# Patient Record
Sex: FEMALE | Race: BLACK OR AFRICAN AMERICAN | NOT HISPANIC OR LATINO | Employment: FULL TIME | URBAN - METROPOLITAN AREA
[De-identification: names, ages, dates, MRNs, and addresses within clinical notes are randomized per-mention and may not be internally consistent; named-entity substitution may affect disease eponyms.]

---

## 2017-02-14 ENCOUNTER — OFFICE VISIT - HEALTHEAST (OUTPATIENT)
Dept: FAMILY MEDICINE | Facility: CLINIC | Age: 52
End: 2017-02-14

## 2017-02-14 ENCOUNTER — RECORDS - HEALTHEAST (OUTPATIENT)
Dept: ADMINISTRATIVE | Facility: OTHER | Age: 52
End: 2017-02-14

## 2017-02-14 DIAGNOSIS — E66.9 OBESITY: ICD-10-CM

## 2017-02-14 DIAGNOSIS — R53.83 FATIGUE, UNSPECIFIED TYPE: ICD-10-CM

## 2017-02-14 DIAGNOSIS — M54.50 CHRONIC BILATERAL LOW BACK PAIN WITHOUT SCIATICA: ICD-10-CM

## 2017-02-14 DIAGNOSIS — G89.29 CHRONIC BILATERAL LOW BACK PAIN WITHOUT SCIATICA: ICD-10-CM

## 2017-02-14 DIAGNOSIS — Z00.00 ROUTINE GENERAL MEDICAL EXAMINATION AT A HEALTH CARE FACILITY: ICD-10-CM

## 2017-02-14 DIAGNOSIS — M54.6 CHRONIC BILATERAL THORACIC BACK PAIN: ICD-10-CM

## 2017-02-14 DIAGNOSIS — M79.89 SOFT TISSUE MASS: ICD-10-CM

## 2017-02-14 DIAGNOSIS — E55.9 VITAMIN D DEFICIENCY: ICD-10-CM

## 2017-02-14 DIAGNOSIS — G89.29 CHRONIC BILATERAL THORACIC BACK PAIN: ICD-10-CM

## 2017-02-14 LAB
CHOLEST SERPL-MCNC: 231 MG/DL
FASTING STATUS PATIENT QL REPORTED: YES
HDLC SERPL-MCNC: 61 MG/DL
LDLC SERPL CALC-MCNC: 154 MG/DL
TRIGL SERPL-MCNC: 81 MG/DL

## 2017-02-14 ASSESSMENT — MIFFLIN-ST. JEOR: SCORE: 1472.65

## 2017-02-16 ENCOUNTER — OFFICE VISIT - HEALTHEAST (OUTPATIENT)
Dept: SURGERY | Facility: CLINIC | Age: 52
End: 2017-02-16

## 2017-02-16 DIAGNOSIS — R22.31 MASS OF SKIN OF RIGHT SHOULDER: ICD-10-CM

## 2017-02-16 ASSESSMENT — MIFFLIN-ST. JEOR: SCORE: 1479

## 2017-02-23 ENCOUNTER — HOSPITAL ENCOUNTER (OUTPATIENT)
Dept: ULTRASOUND IMAGING | Facility: HOSPITAL | Age: 52
Discharge: HOME | End: 2017-02-23
Attending: SURGERY

## 2017-02-23 DIAGNOSIS — R22.31 MASS OF SKIN OF RIGHT SHOULDER: ICD-10-CM

## 2017-03-01 ENCOUNTER — COMMUNICATION - HEALTHEAST (OUTPATIENT)
Dept: SURGERY | Facility: CLINIC | Age: 52
End: 2017-03-01

## 2017-04-27 ENCOUNTER — COMMUNICATION - HEALTHEAST (OUTPATIENT)
Dept: FAMILY MEDICINE | Facility: CLINIC | Age: 52
End: 2017-04-27

## 2017-05-18 ENCOUNTER — OFFICE VISIT - HEALTHEAST (OUTPATIENT)
Dept: SURGERY | Facility: CLINIC | Age: 52
End: 2017-05-18

## 2017-05-18 DIAGNOSIS — R22.31 MASS OF SKIN OF RIGHT SHOULDER: ICD-10-CM

## 2017-05-18 ASSESSMENT — MIFFLIN-ST. JEOR: SCORE: 1476.73

## 2017-05-19 ENCOUNTER — RECORDS - HEALTHEAST (OUTPATIENT)
Dept: ADMINISTRATIVE | Facility: OTHER | Age: 52
End: 2017-05-19

## 2017-05-19 ENCOUNTER — AMBULATORY - HEALTHEAST (OUTPATIENT)
Dept: SURGERY | Facility: CLINIC | Age: 52
End: 2017-05-19

## 2017-05-22 ENCOUNTER — RECORDS - HEALTHEAST (OUTPATIENT)
Dept: ADMINISTRATIVE | Facility: OTHER | Age: 52
End: 2017-05-22

## 2017-05-22 ENCOUNTER — AMBULATORY - HEALTHEAST (OUTPATIENT)
Dept: SURGERY | Facility: CLINIC | Age: 52
End: 2017-05-22

## 2017-05-25 ENCOUNTER — HOSPITAL ENCOUNTER (OUTPATIENT)
Dept: MRI IMAGING | Facility: HOSPITAL | Age: 52
Discharge: HOME | End: 2017-05-25
Attending: PSYCHIATRY & NEUROLOGY

## 2017-05-25 DIAGNOSIS — R51.9 OCCIPITAL PAIN: ICD-10-CM

## 2017-05-25 DIAGNOSIS — M54.2 NECK PAIN: ICD-10-CM

## 2017-05-25 DIAGNOSIS — I67.1 CEREBRAL ANEURYSM: ICD-10-CM

## 2017-07-18 ENCOUNTER — RECORDS - HEALTHEAST (OUTPATIENT)
Dept: ADMINISTRATIVE | Facility: OTHER | Age: 52
End: 2017-07-18

## 2017-08-01 ENCOUNTER — RECORDS - HEALTHEAST (OUTPATIENT)
Dept: ADMINISTRATIVE | Facility: OTHER | Age: 52
End: 2017-08-01

## 2018-01-02 ENCOUNTER — RECORDS - HEALTHEAST (OUTPATIENT)
Dept: ADMINISTRATIVE | Facility: OTHER | Age: 53
End: 2018-01-02

## 2018-01-15 ENCOUNTER — OFFICE VISIT - HEALTHEAST (OUTPATIENT)
Dept: FAMILY MEDICINE | Facility: CLINIC | Age: 53
End: 2018-01-15

## 2018-01-15 DIAGNOSIS — M35.00 SICCA SYNDROME (H): ICD-10-CM

## 2018-01-15 DIAGNOSIS — J45.21 MILD INTERMITTENT ASTHMA WITH EXACERBATION: ICD-10-CM

## 2018-01-15 DIAGNOSIS — M54.6 CHRONIC MIDLINE THORACIC BACK PAIN: ICD-10-CM

## 2018-01-15 DIAGNOSIS — J20.9 ACUTE BRONCHITIS, UNSPECIFIED ORGANISM: ICD-10-CM

## 2018-01-15 DIAGNOSIS — G89.29 CHRONIC MIDLINE THORACIC BACK PAIN: ICD-10-CM

## 2018-01-15 ASSESSMENT — MIFFLIN-ST. JEOR: SCORE: 1486.26

## 2018-02-09 ENCOUNTER — OFFICE VISIT (OUTPATIENT)
Dept: FAMILY MEDICINE | Facility: CLINIC | Age: 53
End: 2018-02-09
Payer: COMMERCIAL

## 2018-02-09 VITALS
TEMPERATURE: 98 F | OXYGEN SATURATION: 98 % | HEART RATE: 65 BPM | WEIGHT: 200 LBS | DIASTOLIC BLOOD PRESSURE: 59 MMHG | SYSTOLIC BLOOD PRESSURE: 97 MMHG | BODY MASS INDEX: 34.15 KG/M2 | HEIGHT: 64 IN

## 2018-02-09 DIAGNOSIS — Z78.9 ENGAGES IN TRAVEL ABROAD: Primary | ICD-10-CM

## 2018-02-09 PROBLEM — C50.912 MALIGNANT NEOPLASM OF LEFT FEMALE BREAST, UNSPECIFIED ESTROGEN RECEPTOR STATUS, UNSPECIFIED SITE OF BREAST (H): Status: ACTIVE | Noted: 2018-02-09

## 2018-02-09 PROCEDURE — 99212 OFFICE O/P EST SF 10 MIN: CPT | Mod: 25 | Performed by: PHYSICIAN ASSISTANT

## 2018-02-09 PROCEDURE — 90734 MENACWYD/MENACWYCRM VACC IM: CPT | Performed by: PHYSICIAN ASSISTANT

## 2018-02-09 PROCEDURE — 90471 IMMUNIZATION ADMIN: CPT | Performed by: PHYSICIAN ASSISTANT

## 2018-02-09 NOTE — NURSING NOTE
"Chief Complaint   Patient presents with     \[facility] [place] [facility]\"" Care     menatra shot        Initial BP 97/59 (BP Location: Right arm, Patient Position: Sitting, Cuff Size: Adult Large)  Pulse 65  Temp 98  F (36.7  C) (Oral)  Ht 5' 4\" (1.626 m)  Wt 200 lb (90.7 kg)  SpO2 98%  BMI 34.33 kg/m2 Estimated body mass index is 34.33 kg/(m^2) as calculated from the following:    Height as of this encounter: 5' 4\" (1.626 m).    Weight as of this encounter: 200 lb (90.7 kg).  Medication Reconciliation: complete  Candice Choudhury MA    "

## 2018-02-09 NOTE — MR AVS SNAPSHOT
"              After Visit Summary   2018    Roverto Gonzalez    MRN: [id number]           Patient Information     Date Of Birth          1965        Visit Information        Provider Department      2018 2:00 PM Gladys Johnston PA-C [facility]        Today's Diagnoses     Engages in travel abroad    -  1       Follow-ups after your visit        Who to contact     If you have questions or need follow up information about today's clinic visit or your schedule please contact [facility] directly at [phone].  Normal or non-critical lab and imaging results will be communicated to you by M.dothart, letter or phone within 4 business days after the clinic has received the results. If you do not hear from us within 7 days, please contact the clinic through M.dothart or phone. If you have a critical or abnormal lab result, we will notify you by phone as soon as possible.  Submit refill requests through [employer] or call your pharmacy and they will forward the refill request to us. Please allow 3 business days for your refill to be completed.          Additional Information About Your Visit        MyChart Information     [employer] lets you send messages to your doctor, view your test results, renew your prescriptions, schedule appointments and more. To sign up, go to [email or web address] . Click on \"Log in\" on the left side of the screen, which will take you to the Welcome page. Then click on \"Sign up Now\" on the right side of the page.     You will be asked to enter the access code listed below, as well as some personal information. Please follow the directions to create your username and password.     Your access code is: [email or web address]  Expires: 2018  6:35 AM     Your access code will  in 90 days. If you need help or a new code, please call your [facility] or [phone].        Care EveryWhere ID     This is your Care EveryWhere ID. This could be " "used by other organizations to access your [facility] medical records  [id number]        Your Vitals Were     Pulse Temperature Height Pulse Oximetry BMI (Body Mass Index)       65 98  F (36.7  C) (Oral) 5' 4\" (1.626 m) 98% 34.33 kg/m2        Blood Pressure from Last 3 Encounters:   02/09/18 97/59    Weight from Last 3 Encounters:   02/09/18 200 lb (90.7 kg)              We Performed the Following     MENINGOCOCCAL VACCINE,IM (MENACTRA ))        Primary Care Provider Office Phone # Fax #    [facility] [phone]       [address] [place] [id number]        Equal Access to Services     MIRZA GARCIA : Rogerio Martinez, wareed franco, cheryl kaalmada ramonita, bonnie slaughter . [facility] [phone].    ATENCIÓN: Si habla español, tiene a silvestre disposición servicios gratuitos de asistencia lingüística. Llame al [phone].    We comply with applicable federal civil rights laws and [place] laws. We do not discriminate on the basis of race, color, national origin, age, disability, sex, sexual orientation, or gender identity.            Thank you!     Thank you for choosing [facility]  for your care. Our goal is always to provide you with excellent care. Hearing back from our patients is one way we can continue to improve our services. Please take a few minutes to complete the written survey that you may receive in the mail after your visit with us. Thank you!             Your Updated Medication List - Protect others around you: Learn how to safely use, store and throw away your medicines at www.disposemymeds.org.      Notice  As of 2/9/2018  2:39 PM    You have not been prescribed any medications.      "

## 2018-02-09 NOTE — NURSING NOTE
Patient is covered under this program for the following reason:        Screening Questionnaire for Adult Immunization   Are you sick today?   No    Do you have allergies to medications, food, a vaccine component or latex?   No    Have you ever had a serious reaction after receiving a vaccination?   No    Do you have a long-term health problem with heart disease, lung disease,  asthma, kidney disease, metabolic (e.g., diabetes), anemia, or other blood disorder?   No    Do you have cancer, leukemia,HIV/ AIDS, or any immune system problem?   No       In the past 3 months, have you taken medications that weaken your immune system, such as cortisone, prednisone, other steroids, or anticancer drugs, or have you had radiation treatments?   No    Have you had a seizure or a brain, or other nervous system problem?   No    During the past year, have you received a transfusion of blood or blood       products, or been given a  immune (gamma) globulin or an antiviral drug?   No    For women: Are you pregnant or is there a chance you could become         pregnant during the next month?   No    Have you received any vaccinations in the past 4 weeks?   No     Immunization questionnaire answers were all negative.     VIS for Menatra  given on same date of administration.  Staff signature/Title: Candice Choudhury MA  Due to injection administration, patient instructed to remain in clinic for 15 minutes  afterwards, and to report any adverse reaction to me immediately.

## 2018-02-09 NOTE — PROGRESS NOTES
"  SUBJECTIVE:   Roverto Gonzalez is a 53 year old female who presents to clinic today for the following health issues:      New Patient/Transfer of Care    Meningococcal  shot    Traveling to [facility]    Needs meningitis for visa   Leaving March 11 for a pilgrimage.        Problem list and histories reviewed & adjusted, as indicated.    Additional history: as documented    There is no problem list on file for this patient.    Past Surgical History:   Procedure Laterality Date     LUMPECTOMY BREAST      breast left     US DRAIN OF SEROMA/HEMATOMA/ABSCESS/CYST Right 2017       Social History   Substance Use Topics     Smoking status: Never Smoker     Smokeless tobacco: Never Used     Alcohol use No     Family History   Problem Relation Age of Onset     DIABETES Mother      CANCER Father            Reviewed and updated as needed this visit by clinical staff  Allergies  Meds       Reviewed and updated as needed this visit by Provider         ROS:  As above    OBJECTIVE:     BP 97/59 (BP Location: Right arm, Patient Position: Sitting, Cuff Size: Adult Large)  Pulse 65  Temp 98  F (36.7  C) (Oral)  Ht 5' 4\" (1.626 m)  Wt 200 lb (90.7 kg)  SpO2 98%  BMI 34.33 kg/m2  Body mass index is 34.33 kg/(m^2).  GENERAL: healthy, alert and no distress    Diagnostic Test Results:  none     ASSESSMENT/PLAN:       1. Engages in travel abroad  Needs this for visa.  Should also get Hep a and typhoid and polio.  Pt only wants the meningitis today   - MENINGOCOCCAL VACCINE,IM (MENACTRA ))        Gladys Johnston PA-C  [facility]      "

## 2018-02-16 ENCOUNTER — OFFICE VISIT - HEALTHEAST (OUTPATIENT)
Dept: FAMILY MEDICINE | Facility: CLINIC | Age: 53
End: 2018-02-16

## 2018-02-16 DIAGNOSIS — J45.20 MILD INTERMITTENT ASTHMA WITHOUT COMPLICATION: ICD-10-CM

## 2018-02-16 DIAGNOSIS — R73.01 IMPAIRED FASTING GLUCOSE: ICD-10-CM

## 2018-02-16 DIAGNOSIS — E66.811 OBESITY (BMI 30.0-34.9): ICD-10-CM

## 2018-02-16 DIAGNOSIS — M35.00 SICCA SYNDROME (H): ICD-10-CM

## 2018-02-16 DIAGNOSIS — E55.9 VITAMIN D DEFICIENCY: ICD-10-CM

## 2018-02-16 DIAGNOSIS — Z13.1 DIABETES MELLITUS SCREENING: ICD-10-CM

## 2018-02-16 DIAGNOSIS — Z00.00 ROUTINE GENERAL MEDICAL EXAMINATION AT A HEALTH CARE FACILITY: ICD-10-CM

## 2018-02-16 DIAGNOSIS — Z13.220 LIPID SCREENING: ICD-10-CM

## 2018-02-16 LAB
CHOLEST SERPL-MCNC: 219 MG/DL
FASTING STATUS PATIENT QL REPORTED: YES
FASTING STATUS PATIENT QL REPORTED: YES
GLUCOSE BLD-MCNC: 104 MG/DL (ref 70–125)
HBA1C MFR BLD: 6.2 % (ref 3.5–6)
HDLC SERPL-MCNC: 51 MG/DL
LDLC SERPL CALC-MCNC: 149 MG/DL
TRIGL SERPL-MCNC: 97 MG/DL

## 2018-02-16 ASSESSMENT — MIFFLIN-ST. JEOR: SCORE: 1470.27

## 2018-02-19 LAB
25(OH)D3 SERPL-MCNC: 24.6 NG/ML (ref 30–80)
25(OH)D3 SERPL-MCNC: 24.6 NG/ML (ref 30–80)

## 2018-04-04 ENCOUNTER — COMMUNICATION - HEALTHEAST (OUTPATIENT)
Dept: FAMILY MEDICINE | Facility: CLINIC | Age: 53
End: 2018-04-04

## 2018-04-16 ENCOUNTER — COMMUNICATION - HEALTHEAST (OUTPATIENT)
Dept: FAMILY MEDICINE | Facility: CLINIC | Age: 53
End: 2018-04-16

## 2018-04-16 ENCOUNTER — TELEPHONE (OUTPATIENT)
Dept: FAMILY MEDICINE | Facility: CLINIC | Age: 53
End: 2018-04-16

## 2018-04-16 NOTE — LETTER
April 16, 2018    Roverto Carlos  [address] [address]    Dear Roverto    We care about your health and have reviewed your health plan. We have reviewed your medical conditions, medication list, and lab results and are making recommendations based on this review, to better manage your health.    You are in particular need of attention regarding:  - Scheduling a Breast Cancer Screening (Mammography) [phone]  - Scheduling a Colon Cancer Screening (Colonoscopy only) [phone]  - Scheduling a Physical with a Cervical Cancer Screening (Pap Smear) age 64 and younger [phone]      Here is a list of Health Maintenance topics that are due now or due soon:  Health Maintenance Due   Topic Date Due     HEPATITIS C SCREENING  01/01/1981     PAP SCREENING Q3 YR (SYSTEM ASSIGNED)  01/01/1984     LIPID SCREEN Q5 YR FEMALE (SYSTEM ASSIGNED)  01/01/2008     MAMMO SCREEN Q2 YR (SYSTEM ASSIGNED)  01/01/2013     COLON CANCER SCREEN (SYSTEM ASSIGNED)  01/01/2013     ADVANCE DIRECTIVE PLANNING Q5 YRS  01/01/2018     We will be calling you in the next couple of weeks to help you schedule any appointments that are needed.  Please call us at [phone] (or use [employer]) to address the above recommendations.     Thank you for trusting [facility] and we appreciate the opportunity to serve you.  We look forward to supporting your healthcare needs in the future.    Healthy Regards,    Lina Johnston

## 2018-04-16 NOTE — TELEPHONE ENCOUNTER
Panel Management Review      Patient has the following on her problem list: None      Composite cancer screening  Chart review shows that this patient is due/due soon for the following Pap Smear, Mammogram and Colonoscopy  Summary:    Patient is due/failing the following:   COLONOSCOPY, MAMMOGRAM, PAP and PHYSICAL    Action needed:   Patient needs office visit for Physical .    Type of outreach:    Sent letter.    Questions for provider review:    None                                                                                                                                    Candice Choudhury MA       Chart routed to Care Team .

## 2018-05-22 ENCOUNTER — RECORDS - HEALTHEAST (OUTPATIENT)
Dept: GENERAL RADIOLOGY | Facility: CLINIC | Age: 53
End: 2018-05-22

## 2018-05-22 ENCOUNTER — OFFICE VISIT - HEALTHEAST (OUTPATIENT)
Dept: RHEUMATOLOGY | Facility: CLINIC | Age: 53
End: 2018-05-22

## 2018-05-22 DIAGNOSIS — M79.645 PAIN OF LEFT MIDDLE FINGER: ICD-10-CM

## 2018-05-22 DIAGNOSIS — G47.8 NON-RESTORATIVE SLEEP: ICD-10-CM

## 2018-05-22 DIAGNOSIS — R76.8 JO-1 ANTIBODY POSITIVE: ICD-10-CM

## 2018-05-22 DIAGNOSIS — L29.9 PRURITIC CONDITION: ICD-10-CM

## 2018-05-22 DIAGNOSIS — R09.89 RHONCHI: ICD-10-CM

## 2018-05-22 DIAGNOSIS — M77.12 LATERAL EPICONDYLITIS, LEFT ELBOW: ICD-10-CM

## 2018-05-22 DIAGNOSIS — R68.2 DRY MOUTH: ICD-10-CM

## 2018-05-22 DIAGNOSIS — M79.18 MYOFASCIAL PAIN: ICD-10-CM

## 2018-05-22 DIAGNOSIS — M79.645 PAIN IN LEFT FINGER(S): ICD-10-CM

## 2018-05-22 DIAGNOSIS — R05.3 COUGH, PERSISTENT: ICD-10-CM

## 2018-05-22 DIAGNOSIS — M77.12 LEFT LATERAL EPICONDYLITIS: ICD-10-CM

## 2018-05-22 DIAGNOSIS — E55.9 VITAMIN D DEFICIENCY: ICD-10-CM

## 2018-05-22 LAB
ALBUMIN SERPL-MCNC: 3.8 G/DL (ref 3.5–5)
ALBUMIN UR-MCNC: NEGATIVE MG/DL
ALP SERPL-CCNC: 118 U/L (ref 45–120)
ALT SERPL W P-5'-P-CCNC: 64 U/L (ref 0–45)
APPEARANCE UR: CLEAR
AST SERPL W P-5'-P-CCNC: 35 U/L (ref 0–40)
BASOPHILS # BLD AUTO: 0 THOU/UL (ref 0–0.2)
BASOPHILS NFR BLD AUTO: 0 % (ref 0–2)
BILIRUB DIRECT SERPL-MCNC: 0.2 MG/DL
BILIRUB SERPL-MCNC: 0.5 MG/DL (ref 0–1)
BILIRUB UR QL STRIP: NEGATIVE
C REACTIVE PROTEIN LHE: 1.4 MG/DL (ref 0–0.8)
CALCIUM SERPL-MCNC: 9.4 MG/DL (ref 8.5–10.5)
CK SERPL-CCNC: 88 U/L (ref 30–190)
COLOR UR AUTO: YELLOW
CREAT SERPL-MCNC: 0.65 MG/DL (ref 0.6–1.1)
EOSINOPHIL # BLD AUTO: 0.3 THOU/UL (ref 0–0.4)
EOSINOPHIL NFR BLD AUTO: 8 % (ref 0–6)
ERYTHROCYTE [DISTWIDTH] IN BLOOD BY AUTOMATED COUNT: 13.3 % (ref 11–14.5)
ERYTHROCYTE [SEDIMENTATION RATE] IN BLOOD BY WESTERGREN METHOD: 42 MM/HR (ref 0–20)
GFR SERPL CREATININE-BSD FRML MDRD: >60 ML/MIN/1.73M2
GLUCOSE UR STRIP-MCNC: NEGATIVE MG/DL
HCT VFR BLD AUTO: 38 % (ref 35–47)
HGB BLD-MCNC: 13.1 G/DL (ref 12–16)
HGB UR QL STRIP: NEGATIVE
KETONES UR STRIP-MCNC: NEGATIVE MG/DL
LEUKOCYTE ESTERASE UR QL STRIP: NEGATIVE
LYMPHOCYTES # BLD AUTO: 1.9 THOU/UL (ref 0.8–4.4)
LYMPHOCYTES NFR BLD AUTO: 43 % (ref 20–40)
MCH RBC QN AUTO: 28.3 PG (ref 27–34)
MCHC RBC AUTO-ENTMCNC: 34.5 G/DL (ref 32–36)
MCV RBC AUTO: 82 FL (ref 80–100)
MONOCYTES # BLD AUTO: 0.3 THOU/UL (ref 0–0.9)
MONOCYTES NFR BLD AUTO: 8 % (ref 2–10)
NEUTROPHILS # BLD AUTO: 1.9 THOU/UL (ref 2–7.7)
NEUTROPHILS NFR BLD AUTO: 42 % (ref 50–70)
NITRATE UR QL: NEGATIVE
PH UR STRIP: 7 [PH] (ref 5–8)
PLATELET # BLD AUTO: 194 THOU/UL (ref 140–440)
PMV BLD AUTO: 8.2 FL (ref 7–10)
PROT SERPL-MCNC: 7.5 G/DL (ref 6–8)
RBC # BLD AUTO: 4.63 MILL/UL (ref 3.8–5.4)
SP GR UR STRIP: 1.02 (ref 1–1.03)
TSH SERPL DL<=0.005 MIU/L-ACNC: 1.55 UIU/ML (ref 0.3–5)
UROBILINOGEN UR STRIP-ACNC: NORMAL
WBC: 4.4 THOU/UL (ref 4–11)

## 2018-05-22 ASSESSMENT — MIFFLIN-ST. JEOR: SCORE: 1462.56

## 2018-05-23 LAB — 25(OH)D3 SERPL-MCNC: 27 NG/ML (ref 30–80)

## 2018-05-29 ENCOUNTER — COMMUNICATION - HEALTHEAST (OUTPATIENT)
Dept: RHEUMATOLOGY | Facility: CLINIC | Age: 53
End: 2018-05-29

## 2018-05-29 DIAGNOSIS — D70.9 NEUTROPENIA (H): ICD-10-CM

## 2018-05-29 DIAGNOSIS — E55.9 VITAMIN D DEFICIENCY: ICD-10-CM

## 2018-05-29 DIAGNOSIS — R74.01 ELEVATED ALT MEASUREMENT: ICD-10-CM

## 2018-05-29 LAB
JO-1 AUTOANTIBODIES - HISTORICAL: 29 EU
SS-A/RO AUTOANTIBODIES - HISTORICAL: 1 EU
SS-B/LA AUTOANTIBODIES - HISTORICAL: 1 EU

## 2018-06-26 ENCOUNTER — OFFICE VISIT - HEALTHEAST (OUTPATIENT)
Dept: RHEUMATOLOGY | Facility: CLINIC | Age: 53
End: 2018-06-26

## 2018-06-26 DIAGNOSIS — M77.12 LEFT LATERAL EPICONDYLITIS: ICD-10-CM

## 2018-06-26 DIAGNOSIS — M79.18 MYOFASCIAL PAIN: ICD-10-CM

## 2018-06-26 DIAGNOSIS — R70.0 ELEVATED SED RATE: ICD-10-CM

## 2018-06-26 DIAGNOSIS — R09.89 RHONCHI: ICD-10-CM

## 2018-06-26 DIAGNOSIS — R76.8 JO-1 ANTIBODY POSITIVE: ICD-10-CM

## 2018-06-26 DIAGNOSIS — D70.9 NEUTROPENIA, UNSPECIFIED TYPE (H): ICD-10-CM

## 2018-06-26 DIAGNOSIS — E55.9 VITAMIN D DEFICIENCY: ICD-10-CM

## 2018-06-26 DIAGNOSIS — R21 RASH: ICD-10-CM

## 2018-06-26 DIAGNOSIS — R74.01 ELEVATED ALT MEASUREMENT: ICD-10-CM

## 2018-06-26 ASSESSMENT — MIFFLIN-ST. JEOR: SCORE: 1462.56

## 2018-06-27 ENCOUNTER — AMBULATORY - HEALTHEAST (OUTPATIENT)
Dept: LAB | Facility: CLINIC | Age: 53
End: 2018-06-27

## 2018-06-27 DIAGNOSIS — R76.8 JO-1 ANTIBODY POSITIVE: ICD-10-CM

## 2018-06-27 DIAGNOSIS — R74.01 ELEVATED ALT MEASUREMENT: ICD-10-CM

## 2018-06-27 DIAGNOSIS — R21 RASH: ICD-10-CM

## 2018-06-27 DIAGNOSIS — D70.9 NEUTROPENIA, UNSPECIFIED TYPE (H): ICD-10-CM

## 2018-06-27 DIAGNOSIS — R70.0 ELEVATED SED RATE: ICD-10-CM

## 2018-06-27 LAB
ALBUMIN SERPL-MCNC: 4 G/DL (ref 3.5–5)
ALP SERPL-CCNC: 100 U/L (ref 45–120)
ALT SERPL W P-5'-P-CCNC: 22 U/L (ref 0–45)
AST SERPL W P-5'-P-CCNC: 20 U/L (ref 0–40)
BASOPHILS # BLD AUTO: 0 THOU/UL (ref 0–0.2)
BASOPHILS NFR BLD AUTO: 0 % (ref 0–2)
BILIRUB DIRECT SERPL-MCNC: 0.2 MG/DL
BILIRUB SERPL-MCNC: 0.6 MG/DL (ref 0–1)
C REACTIVE PROTEIN LHE: 1.5 MG/DL (ref 0–0.8)
CK SERPL-CCNC: 107 U/L (ref 30–190)
EOSINOPHIL # BLD AUTO: 0.5 THOU/UL (ref 0–0.4)
EOSINOPHIL NFR BLD AUTO: 11 % (ref 0–6)
ERYTHROCYTE [DISTWIDTH] IN BLOOD BY AUTOMATED COUNT: 12.3 % (ref 11–14.5)
ERYTHROCYTE [SEDIMENTATION RATE] IN BLOOD BY WESTERGREN METHOD: 38 MM/HR (ref 0–20)
HCT VFR BLD AUTO: 38.3 % (ref 35–47)
HGB BLD-MCNC: 13.5 G/DL (ref 12–16)
LYMPHOCYTES # BLD AUTO: 2 THOU/UL (ref 0.8–4.4)
LYMPHOCYTES NFR BLD AUTO: 40 % (ref 20–40)
MCH RBC QN AUTO: 28.8 PG (ref 27–34)
MCHC RBC AUTO-ENTMCNC: 35.2 G/DL (ref 32–36)
MCV RBC AUTO: 82 FL (ref 80–100)
MONOCYTES # BLD AUTO: 0.5 THOU/UL (ref 0–0.9)
MONOCYTES NFR BLD AUTO: 10 % (ref 2–10)
NEUTROPHILS # BLD AUTO: 1.9 THOU/UL (ref 2–7.7)
NEUTROPHILS NFR BLD AUTO: 39 % (ref 50–70)
PLATELET # BLD AUTO: 185 THOU/UL (ref 140–440)
PMV BLD AUTO: 8.5 FL (ref 7–10)
PROT SERPL-MCNC: 7.7 G/DL (ref 6–8)
RBC # BLD AUTO: 4.68 MILL/UL (ref 3.8–5.4)
WBC: 5 THOU/UL (ref 4–11)

## 2018-06-28 LAB
ALBUMIN PERCENT: 57.1 % (ref 51–67)
ALBUMIN SERPL ELPH-MCNC: 4.4 G/DL (ref 3.2–4.7)
ALPHA 1 PERCENT: 2.4 % (ref 2–4)
ALPHA 2 PERCENT: 10.7 % (ref 5–13)
ALPHA1 GLOB SERPL ELPH-MCNC: 0.2 G/DL (ref 0.1–0.3)
ALPHA2 GLOB SERPL ELPH-MCNC: 0.8 G/DL (ref 0.4–0.9)
B-GLOBULIN SERPL ELPH-MCNC: 1 G/DL (ref 0.7–1.2)
BETA PERCENT: 12.8 % (ref 10–17)
GAMMA GLOB SERPL ELPH-MCNC: 1.3 G/DL (ref 0.6–1.4)
GAMMA GLOBULIN PERCENT: 17 % (ref 9–20)
HBV SURFACE AG SERPL QL IA: NEGATIVE
HCV AB SERPL QL IA: NEGATIVE
PATH ICD:: NORMAL
PROT PATTERN SERPL ELPH-IMP: NORMAL
PROT SERPL-MCNC: 7.7 G/DL (ref 6–8)
REVIEWING PATHOLOGIST: NORMAL

## 2018-06-29 LAB
ACE SERPL-CCNC: 49 U/L (ref 9–67)
ANCA IGG TITR SER IF: NORMAL {TITER}
PATH ICD:: NORMAL
PROT PATTERN SERPL ELPH-IMP: NORMAL
REVIEWING PATHOLOGIST: NORMAL
TOTAL PROTEIN RANDOM URINE MG/DL: 10 MG/DL

## 2018-07-03 ENCOUNTER — COMMUNICATION - HEALTHEAST (OUTPATIENT)
Dept: RHEUMATOLOGY | Facility: CLINIC | Age: 53
End: 2018-07-03

## 2018-07-03 LAB — DNA (DS) ANTIBODY - HISTORICAL: 3 IU

## 2018-09-06 ENCOUNTER — TELEPHONE (OUTPATIENT)
Dept: FAMILY MEDICINE | Facility: CLINIC | Age: 53
End: 2018-09-06

## 2018-09-06 NOTE — LETTER
September 6, 2018    Roverto Carlos  [address] [facility] [address]    Dear Roverto    We care about your health and have reviewed your health plan. We have reviewed your medical conditions, medication list, and lab results and are making recommendations based on this review, to better manage your health.    You are in particular need of attention regarding:  - Scheduling a Breast Cancer Screening (Mammography) [phone]  - Scheduling a Colon Cancer Screening (Colonoscopy only) [phone]  - Scheduling a Physical with a Cervical Cancer Screening (Pap Smear) age 64 and younger [phone]      Here is a list of Health Maintenance topics that are due now or due soon:  Health Maintenance Due   Topic Date Due     PHQ-2 Q1 YR  01/01/1975     HIV SCREEN (SYSTEM ASSIGNED)  01/01/1981     HEPATITIS C SCREENING  01/01/1981     PAP SCREENING Q3 YR (SYSTEM ASSIGNED)  01/01/1984     LIPID SCREEN Q5 YR FEMALE (SYSTEM ASSIGNED)  01/01/2008     MAMMO SCREEN Q2 YR (SYSTEM ASSIGNED)  01/01/2013     COLON CANCER SCREEN (SYSTEM ASSIGNED)  01/01/2013     ADVANCE DIRECTIVE PLANNING Q5 YRS  01/01/2018     INFLUENZA VACCINE (1) 09/01/2018     We will be calling you in the next couple of weeks to help you schedule any appointments that are needed.  Please call us at [phone] (or use [employer]) to address the above recommendations.     Thank you for trusting [facility] and we appreciate the opportunity to serve you.  We look forward to supporting your healthcare needs in the future.    Healthy Regards,  Lina Johnston

## 2018-09-06 NOTE — TELEPHONE ENCOUNTER
Panel Management Review      Patient has the following on her problem list: None      Composite cancer screening  Chart review shows that this patient is due/due soon for the following Pap Smear, Mammogram and Colonoscopy  Summary:    Patient is due/failing the following:   COLONOSCOPY, MAMMOGRAM, PAP and PHYSICAL    Action needed:   Patient needs office visit for Physical .    Type of outreach:    Sent letter.    Questions for provider review:    None                                                                                                                                         Chart routed to Care Team .

## 2018-09-06 NOTE — LETTER
September 17, 2018    Roverto Gonzalez  [address] [facility] [address]      Dear Roverto Gonzalez,     We have tried to contact you about your health, but have been unable to reach you.  Please call us as soon as possible so we can provide you with the best care possible.  We will continue to check in with you throughout the year to complete these items of care, if you are not able to complete these items at this time.  If you would like to complete the missing items for your care, please contact us at [phone].    We recommend the following:  -schedule a MAMMOGRAM 1 in 8 women will develop invasive breast cancer during her lifetime and it is the most common non-skin cancer in American women.  EARLY detection, new treatments, and a better understanding of the disease have increased survival rates - the 5 year survival rate in the 1960s was 63% and today it is close to 90% .  Please disregard this reminder if you have had this exam elsewhere within the last year.  It would be helpful for us to have a copy of your mammogram report in our file so that we can best coordinate your care - please contact us with when your test was done so we can update your record. Please call [phone] to schedule your mammogram today.   -schedule a COLONOSCOPY to look for colon cancer (due every 10 years or 5 years in higher risk situations.)   Colon cancer is now the second leading cause of death in the [place] for both men and women and there are over 130,000 new cases and 50,000 deaths per year from colon cancer.  Colonoscopies can prevent 90-95% of these deaths.  Problem lesions can be removed before they ever become cancer.  This test is not only looking for cancer, but also getting rid of precancerious lesions.  If you do not wish to do a colonoscopy or cannot afford to do one, at this time, there is another option. It is called a FIT test.  -schedule a PAP SMEAR EXAM which is due.  Please disregard this  reminder if you have had this exam elsewhere within the last year.  It would be helpful for us to have a copy of your recent pap smear report in our file so that we can best coordinate your care.        Sincerely,     Your Care Team at [facility]

## 2018-09-28 ENCOUNTER — COMMUNICATION - HEALTHEAST (OUTPATIENT)
Dept: FAMILY MEDICINE | Facility: CLINIC | Age: 53
End: 2018-09-28

## 2019-03-19 ENCOUNTER — COMMUNICATION - HEALTHEAST (OUTPATIENT)
Dept: FAMILY MEDICINE | Facility: CLINIC | Age: 54
End: 2019-03-19

## 2019-03-25 ENCOUNTER — HOSPITAL ENCOUNTER (OUTPATIENT)
Dept: LAB | Age: 54
Setting detail: SPECIMEN
Discharge: HOME | End: 2019-03-25

## 2019-03-25 ENCOUNTER — RECORDS - HEALTHEAST (OUTPATIENT)
Dept: GENERAL RADIOLOGY | Facility: CLINIC | Age: 54
End: 2019-03-25

## 2019-03-25 ENCOUNTER — OFFICE VISIT - HEALTHEAST (OUTPATIENT)
Dept: FAMILY MEDICINE | Facility: CLINIC | Age: 54
End: 2019-03-25

## 2019-03-25 DIAGNOSIS — R42 DIZZINESS: ICD-10-CM

## 2019-03-25 DIAGNOSIS — E66.811 CLASS 1 OBESITY DUE TO EXCESS CALORIES WITH SERIOUS COMORBIDITY AND BODY MASS INDEX (BMI) OF 33.0 TO 33.9 IN ADULT: ICD-10-CM

## 2019-03-25 DIAGNOSIS — E66.09 CLASS 1 OBESITY DUE TO EXCESS CALORIES WITH SERIOUS COMORBIDITY AND BODY MASS INDEX (BMI) OF 33.0 TO 33.9 IN ADULT: ICD-10-CM

## 2019-03-25 DIAGNOSIS — M35.00 SICCA SYNDROME (H): ICD-10-CM

## 2019-03-25 DIAGNOSIS — R05.9 COUGH: ICD-10-CM

## 2019-03-25 DIAGNOSIS — D70.9 NEUTROPENIA, UNSPECIFIED TYPE (H): ICD-10-CM

## 2019-03-25 DIAGNOSIS — E78.2 MIXED HYPERLIPIDEMIA: ICD-10-CM

## 2019-03-25 DIAGNOSIS — J45.21 MILD INTERMITTENT ASTHMA WITH EXACERBATION: ICD-10-CM

## 2019-03-25 DIAGNOSIS — M54.2 NECK PAIN: ICD-10-CM

## 2019-03-25 DIAGNOSIS — E55.9 VITAMIN D DEFICIENCY: ICD-10-CM

## 2019-03-25 DIAGNOSIS — R73.03 PREDIABETES: ICD-10-CM

## 2019-03-25 DIAGNOSIS — H93.8X2 PRESSURE SENSATION IN LEFT EAR: ICD-10-CM

## 2019-03-25 DIAGNOSIS — C50.512 MALIGNANT NEOPLASM OF LOWER-OUTER QUADRANT OF LEFT FEMALE BREAST, UNSPECIFIED ESTROGEN RECEPTOR STATUS (H): ICD-10-CM

## 2019-03-25 DIAGNOSIS — Z00.00 ROUTINE GENERAL MEDICAL EXAMINATION AT A HEALTH CARE FACILITY: ICD-10-CM

## 2019-03-25 LAB
ALBUMIN SERPL-MCNC: 3.8 G/DL (ref 3.5–5)
ALP SERPL-CCNC: 85 U/L (ref 45–120)
ALT SERPL W P-5'-P-CCNC: 35 U/L (ref 0–45)
ANION GAP SERPL CALCULATED.3IONS-SCNC: 9 MMOL/L (ref 5–18)
AST SERPL W P-5'-P-CCNC: 28 U/L (ref 0–40)
BILIRUB SERPL-MCNC: 0.4 MG/DL (ref 0–1)
BUN SERPL-MCNC: 14 MG/DL (ref 8–22)
CALCIUM SERPL-MCNC: 9.1 MG/DL (ref 8.5–10.5)
CHLORIDE BLD-SCNC: 108 MMOL/L (ref 98–107)
CHOLEST SERPL-MCNC: 217 MG/DL
CO2 SERPL-SCNC: 25 MMOL/L (ref 22–31)
CREAT SERPL-MCNC: 0.67 MG/DL (ref 0.6–1.1)
ERYTHROCYTE [DISTWIDTH] IN BLOOD BY AUTOMATED COUNT: 13 % (ref 11–14.5)
ERYTHROCYTE [SEDIMENTATION RATE] IN BLOOD BY WESTERGREN METHOD: 44 MM/HR (ref 0–20)
FASTING STATUS PATIENT QL REPORTED: YES
GFR SERPL CREATININE-BSD FRML MDRD: >60 ML/MIN/1.73M2
GLUCOSE BLD-MCNC: 102 MG/DL (ref 70–125)
HBA1C MFR BLD: 5.8 % (ref 3.5–6)
HCT VFR BLD AUTO: 37.9 % (ref 35–47)
HDLC SERPL-MCNC: 59 MG/DL
HGB BLD-MCNC: 12.5 G/DL (ref 12–16)
LDLC SERPL CALC-MCNC: 136 MG/DL
MCH RBC QN AUTO: 27.9 PG (ref 27–34)
MCHC RBC AUTO-ENTMCNC: 33 G/DL (ref 32–36)
MCV RBC AUTO: 85 FL (ref 80–100)
PLATELET # BLD AUTO: 167 THOU/UL (ref 140–440)
PMV BLD AUTO: 8.8 FL (ref 7–10)
POTASSIUM BLD-SCNC: 4.1 MMOL/L (ref 3.5–5)
PROT SERPL-MCNC: 7.5 G/DL (ref 6–8)
RBC # BLD AUTO: 4.47 MILL/UL (ref 3.8–5.4)
SODIUM SERPL-SCNC: 142 MMOL/L (ref 136–145)
TRIGL SERPL-MCNC: 109 MG/DL
TSH SERPL DL<=0.005 MIU/L-ACNC: 2.46 UIU/ML (ref 0.3–5)
WBC: 4.8 THOU/UL (ref 4–11)

## 2019-03-25 ASSESSMENT — MIFFLIN-ST. JEOR: SCORE: 1469.81

## 2019-03-26 LAB — 25(OH)D3 SERPL-MCNC: 19.4 NG/ML (ref 30–80)

## 2019-03-31 ENCOUNTER — AMBULATORY - HEALTHEAST (OUTPATIENT)
Dept: FAMILY MEDICINE | Facility: CLINIC | Age: 54
End: 2019-03-31

## 2019-03-31 ENCOUNTER — COMMUNICATION - HEALTHEAST (OUTPATIENT)
Dept: FAMILY MEDICINE | Facility: CLINIC | Age: 54
End: 2019-03-31

## 2019-03-31 DIAGNOSIS — E55.9 VITAMIN D DEFICIENCY: ICD-10-CM

## 2019-04-01 ENCOUNTER — OFFICE VISIT - HEALTHEAST (OUTPATIENT)
Dept: AUDIOLOGY | Facility: CLINIC | Age: 54
End: 2019-04-01

## 2019-04-01 ENCOUNTER — OFFICE VISIT - HEALTHEAST (OUTPATIENT)
Dept: OTOLARYNGOLOGY | Facility: CLINIC | Age: 54
End: 2019-04-01

## 2019-04-01 DIAGNOSIS — H93.8X2 PLUGGED FEELING IN EAR, LEFT: ICD-10-CM

## 2019-04-01 DIAGNOSIS — H93.12 TINNITUS OF LEFT EAR: ICD-10-CM

## 2019-04-01 DIAGNOSIS — H93.8X2 PRESSURE SENSATION IN LEFT EAR: ICD-10-CM

## 2019-04-01 DIAGNOSIS — R51.9 PRESSURE IN HEAD: ICD-10-CM

## 2019-04-01 DIAGNOSIS — H69.91 TYPE C TYMPANOGRAM OF RIGHT EAR: ICD-10-CM

## 2019-04-04 ENCOUNTER — OFFICE VISIT - HEALTHEAST (OUTPATIENT)
Dept: RHEUMATOLOGY | Facility: CLINIC | Age: 54
End: 2019-04-04

## 2019-04-04 DIAGNOSIS — M54.9 UPPER BACK PAIN: ICD-10-CM

## 2019-04-04 DIAGNOSIS — D70.9 NEUTROPENIA, UNSPECIFIED TYPE (H): ICD-10-CM

## 2019-04-04 DIAGNOSIS — M77.12 LEFT LATERAL EPICONDYLITIS: ICD-10-CM

## 2019-04-04 DIAGNOSIS — G89.29 CHRONIC LEFT SHOULDER PAIN: ICD-10-CM

## 2019-04-04 DIAGNOSIS — M25.512 CHRONIC LEFT SHOULDER PAIN: ICD-10-CM

## 2019-04-04 DIAGNOSIS — R70.0 ELEVATED SED RATE: ICD-10-CM

## 2019-04-04 DIAGNOSIS — R76.8 JO-1 ANTIBODY POSITIVE: ICD-10-CM

## 2019-04-04 DIAGNOSIS — R06.02 SOB (SHORTNESS OF BREATH): ICD-10-CM

## 2019-04-04 DIAGNOSIS — R05.9 COUGH: ICD-10-CM

## 2019-04-04 LAB — ERYTHROCYTE [SEDIMENTATION RATE] IN BLOOD BY WESTERGREN METHOD: 37 MM/HR (ref 0–20)

## 2019-04-05 ENCOUNTER — COMMUNICATION - HEALTHEAST (OUTPATIENT)
Dept: OTOLARYNGOLOGY | Facility: CLINIC | Age: 54
End: 2019-04-05

## 2019-04-05 LAB
C REACTIVE PROTEIN LHE: 0.6 MG/DL (ref 0–0.8)
CK SERPL-CCNC: 68 U/L (ref 30–190)

## 2019-04-06 LAB
ALDOLASE SERPL-CCNC: 3.4 U/L (ref 1.5–8.1)
MYOGLOBIN SERPL-MCNC: 30 MCG/L

## 2019-07-19 ENCOUNTER — AMBULATORY - HEALTHEAST (OUTPATIENT)
Dept: LAB | Facility: CLINIC | Age: 54
End: 2019-07-19

## 2019-07-19 DIAGNOSIS — R76.8 JO-1 ANTIBODY POSITIVE: ICD-10-CM

## 2019-07-19 DIAGNOSIS — D70.9 NEUTROPENIA, UNSPECIFIED TYPE (H): ICD-10-CM

## 2019-07-19 DIAGNOSIS — R70.0 ELEVATED SED RATE: ICD-10-CM

## 2019-07-19 LAB
BASOPHILS # BLD AUTO: 0 THOU/UL (ref 0–0.2)
BASOPHILS NFR BLD AUTO: 0 % (ref 0–2)
EOSINOPHIL # BLD AUTO: 0.3 THOU/UL (ref 0–0.4)
EOSINOPHIL NFR BLD AUTO: 6 % (ref 0–6)
ERYTHROCYTE [DISTWIDTH] IN BLOOD BY AUTOMATED COUNT: 11.7 % (ref 11–14.5)
HCT VFR BLD AUTO: 38.3 % (ref 35–47)
HGB BLD-MCNC: 13 G/DL (ref 12–16)
LYMPHOCYTES # BLD AUTO: 1.8 THOU/UL (ref 0.8–4.4)
LYMPHOCYTES NFR BLD AUTO: 40 % (ref 20–40)
MCH RBC QN AUTO: 28.5 PG (ref 27–34)
MCHC RBC AUTO-ENTMCNC: 34 G/DL (ref 32–36)
MCV RBC AUTO: 84 FL (ref 80–100)
MONOCYTES # BLD AUTO: 0.2 THOU/UL (ref 0–0.9)
MONOCYTES NFR BLD AUTO: 5 % (ref 2–10)
NEUTROPHILS # BLD AUTO: 2.2 THOU/UL (ref 2–7.7)
NEUTROPHILS NFR BLD AUTO: 50 % (ref 50–70)
PLATELET # BLD AUTO: 193 THOU/UL (ref 140–440)
PMV BLD AUTO: 8.6 FL (ref 7–10)
RBC # BLD AUTO: 4.56 MILL/UL (ref 3.8–5.4)
WBC: 4.5 THOU/UL (ref 4–11)

## 2019-07-21 LAB — ALDOLASE SERPL-CCNC: 4.4 U/L (ref 1.5–8.1)

## 2019-07-22 ENCOUNTER — COMMUNICATION - HEALTHEAST (OUTPATIENT)
Dept: SCHEDULING | Facility: CLINIC | Age: 54
End: 2019-07-22

## 2019-07-22 ENCOUNTER — OFFICE VISIT - HEALTHEAST (OUTPATIENT)
Dept: FAMILY MEDICINE | Facility: CLINIC | Age: 54
End: 2019-07-22

## 2019-07-22 DIAGNOSIS — E55.9 VITAMIN D DEFICIENCY: ICD-10-CM

## 2019-07-22 DIAGNOSIS — J45.41 MODERATE PERSISTENT ASTHMA WITH EXACERBATION: ICD-10-CM

## 2019-07-22 DIAGNOSIS — I10 BENIGN ESSENTIAL HYPERTENSION: ICD-10-CM

## 2019-07-22 DIAGNOSIS — E78.2 MIXED HYPERLIPIDEMIA: ICD-10-CM

## 2019-07-22 DIAGNOSIS — R21 RASH: ICD-10-CM

## 2019-07-22 LAB
ANION GAP SERPL CALCULATED.3IONS-SCNC: 7 MMOL/L (ref 5–18)
BUN SERPL-MCNC: 14 MG/DL (ref 8–22)
CALCIUM SERPL-MCNC: 9.4 MG/DL (ref 8.5–10.5)
CHLORIDE BLD-SCNC: 107 MMOL/L (ref 98–107)
CO2 SERPL-SCNC: 27 MMOL/L (ref 22–31)
CREAT SERPL-MCNC: 0.74 MG/DL (ref 0.6–1.1)
GFR SERPL CREATININE-BSD FRML MDRD: >60 ML/MIN/1.73M2
GLUCOSE BLD-MCNC: 100 MG/DL (ref 70–125)
POTASSIUM BLD-SCNC: 4.3 MMOL/L (ref 3.5–5)
SODIUM SERPL-SCNC: 141 MMOL/L (ref 136–145)
TSH SERPL DL<=0.005 MIU/L-ACNC: 3.34 UIU/ML (ref 0.3–5)

## 2019-07-22 ASSESSMENT — MIFFLIN-ST. JEOR: SCORE: 1473.78

## 2019-07-23 LAB
25(OH)D3 SERPL-MCNC: 18.1 NG/ML (ref 30–80)
ATRIAL RATE - MUSE: 51 BPM
DIASTOLIC BLOOD PRESSURE - MUSE: NORMAL MMHG
INTERPRETATION ECG - MUSE: NORMAL
P AXIS - MUSE: 18 DEGREES
PR INTERVAL - MUSE: 174 MS
QRS DURATION - MUSE: 82 MS
QT - MUSE: 450 MS
QTC - MUSE: 414 MS
R AXIS - MUSE: -6 DEGREES
SYSTOLIC BLOOD PRESSURE - MUSE: NORMAL MMHG
T AXIS - MUSE: 31 DEGREES
VENTRICULAR RATE- MUSE: 51 BPM

## 2019-07-24 ENCOUNTER — AMBULATORY - HEALTHEAST (OUTPATIENT)
Dept: FAMILY MEDICINE | Facility: CLINIC | Age: 54
End: 2019-07-24

## 2019-07-24 DIAGNOSIS — E55.9 VITAMIN D DEFICIENCY: ICD-10-CM

## 2019-07-25 ENCOUNTER — COMMUNICATION - HEALTHEAST (OUTPATIENT)
Dept: FAMILY MEDICINE | Facility: CLINIC | Age: 54
End: 2019-07-25

## 2019-07-25 ENCOUNTER — OFFICE VISIT - HEALTHEAST (OUTPATIENT)
Dept: RHEUMATOLOGY | Facility: CLINIC | Age: 54
End: 2019-07-25

## 2019-07-25 DIAGNOSIS — R29.898 WEAKNESS OF BOTH HANDS: ICD-10-CM

## 2019-07-25 DIAGNOSIS — G89.29 CHRONIC LEFT SHOULDER PAIN: ICD-10-CM

## 2019-07-25 DIAGNOSIS — R06.02 SOB (SHORTNESS OF BREATH): ICD-10-CM

## 2019-07-25 DIAGNOSIS — M54.9 UPPER BACK PAIN: ICD-10-CM

## 2019-07-25 DIAGNOSIS — M25.512 CHRONIC LEFT SHOULDER PAIN: ICD-10-CM

## 2019-07-25 DIAGNOSIS — R76.8 JO-1 ANTIBODY POSITIVE: ICD-10-CM

## 2019-07-25 DIAGNOSIS — R29.898 WEAKNESS OF BOTH ARMS: ICD-10-CM

## 2019-07-25 DIAGNOSIS — R05.9 COUGH: ICD-10-CM

## 2019-07-25 DIAGNOSIS — R70.0 ELEVATED SED RATE: ICD-10-CM

## 2019-07-25 ASSESSMENT — MIFFLIN-ST. JEOR: SCORE: 1480.59

## 2019-07-29 ENCOUNTER — OFFICE VISIT - HEALTHEAST (OUTPATIENT)
Dept: PULMONOLOGY | Facility: OTHER | Age: 54
End: 2019-07-29

## 2019-07-29 ENCOUNTER — AMBULATORY - HEALTHEAST (OUTPATIENT)
Dept: PULMONOLOGY | Facility: OTHER | Age: 54
End: 2019-07-29

## 2019-07-29 DIAGNOSIS — J45.20 MILD INTERMITTENT ASTHMA WITHOUT COMPLICATION: ICD-10-CM

## 2019-07-29 DIAGNOSIS — J45.21 MILD INTERMITTENT ASTHMA WITH EXACERBATION: ICD-10-CM

## 2019-07-29 ASSESSMENT — MIFFLIN-ST. JEOR: SCORE: 1476.51

## 2019-09-03 ENCOUNTER — OFFICE VISIT - HEALTHEAST (OUTPATIENT)
Dept: PHYSICAL THERAPY | Facility: REHABILITATION | Age: 54
End: 2019-09-03

## 2019-09-03 ENCOUNTER — OFFICE VISIT - HEALTHEAST (OUTPATIENT)
Dept: OCCUPATIONAL THERAPY | Facility: REHABILITATION | Age: 54
End: 2019-09-03

## 2019-09-03 ENCOUNTER — AMBULATORY - HEALTHEAST (OUTPATIENT)
Dept: LAB | Facility: CLINIC | Age: 54
End: 2019-09-03

## 2019-09-03 DIAGNOSIS — Z78.9 DECREASED ACTIVITIES OF DAILY LIVING (ADL): ICD-10-CM

## 2019-09-03 DIAGNOSIS — M25.512 CHRONIC LEFT SHOULDER PAIN: ICD-10-CM

## 2019-09-03 DIAGNOSIS — M62.81 MUSCLE WEAKNESS OF LEFT UPPER EXTREMITY: ICD-10-CM

## 2019-09-03 DIAGNOSIS — G89.29 CHRONIC UPPER BACK PAIN: ICD-10-CM

## 2019-09-03 DIAGNOSIS — R70.0 ELEVATED SED RATE: ICD-10-CM

## 2019-09-03 DIAGNOSIS — R76.8 JO-1 ANTIBODY POSITIVE: ICD-10-CM

## 2019-09-03 DIAGNOSIS — R29.898 WEAKNESS OF BOTH HANDS: ICD-10-CM

## 2019-09-03 DIAGNOSIS — M54.9 CHRONIC UPPER BACK PAIN: ICD-10-CM

## 2019-09-03 DIAGNOSIS — G89.29 CHRONIC LEFT SHOULDER PAIN: ICD-10-CM

## 2019-09-03 DIAGNOSIS — M79.645 PAIN IN FINGER OF LEFT HAND: ICD-10-CM

## 2019-09-03 DIAGNOSIS — M25.612 DECREASED ROM OF LEFT SHOULDER: ICD-10-CM

## 2019-09-03 LAB
C REACTIVE PROTEIN LHE: 1.8 MG/DL (ref 0–0.8)
CK SERPL-CCNC: 132 U/L (ref 30–190)
ERYTHROCYTE [SEDIMENTATION RATE] IN BLOOD BY WESTERGREN METHOD: 50 MM/HR (ref 0–20)

## 2019-09-04 ENCOUNTER — RECORDS - HEALTHEAST (OUTPATIENT)
Dept: PULMONOLOGY | Facility: OTHER | Age: 54
End: 2019-09-04

## 2019-09-04 ENCOUNTER — RECORDS - HEALTHEAST (OUTPATIENT)
Dept: ADMINISTRATIVE | Facility: OTHER | Age: 54
End: 2019-09-04

## 2019-09-04 DIAGNOSIS — J45.20 MILD INTERMITTENT ASTHMA, UNCOMPLICATED: ICD-10-CM

## 2019-09-04 LAB — HGB BLD-MCNC: 11.5 G/DL

## 2019-09-06 ENCOUNTER — OFFICE VISIT - HEALTHEAST (OUTPATIENT)
Dept: PHYSICAL THERAPY | Facility: REHABILITATION | Age: 54
End: 2019-09-06

## 2019-09-06 DIAGNOSIS — M25.612 DECREASED ROM OF LEFT SHOULDER: ICD-10-CM

## 2019-09-06 DIAGNOSIS — M62.81 MUSCLE WEAKNESS OF LEFT UPPER EXTREMITY: ICD-10-CM

## 2019-09-06 DIAGNOSIS — G89.29 CHRONIC UPPER BACK PAIN: ICD-10-CM

## 2019-09-06 DIAGNOSIS — M54.9 CHRONIC UPPER BACK PAIN: ICD-10-CM

## 2019-09-06 DIAGNOSIS — G89.29 CHRONIC LEFT SHOULDER PAIN: ICD-10-CM

## 2019-09-06 DIAGNOSIS — M25.512 CHRONIC LEFT SHOULDER PAIN: ICD-10-CM

## 2019-09-06 LAB — MYOGLOBIN SERPL-MCNC: 29 MCG/L

## 2019-09-10 ENCOUNTER — OFFICE VISIT - HEALTHEAST (OUTPATIENT)
Dept: PHYSICAL THERAPY | Facility: REHABILITATION | Age: 54
End: 2019-09-10

## 2019-09-10 ENCOUNTER — OFFICE VISIT - HEALTHEAST (OUTPATIENT)
Dept: PULMONOLOGY | Facility: OTHER | Age: 54
End: 2019-09-10

## 2019-09-10 DIAGNOSIS — J45.40 MODERATE PERSISTENT ASTHMA WITHOUT COMPLICATION: ICD-10-CM

## 2019-09-10 DIAGNOSIS — G89.29 CHRONIC LEFT SHOULDER PAIN: ICD-10-CM

## 2019-09-10 DIAGNOSIS — M54.9 CHRONIC UPPER BACK PAIN: ICD-10-CM

## 2019-09-10 DIAGNOSIS — J45.21 MILD INTERMITTENT ASTHMA WITH EXACERBATION: ICD-10-CM

## 2019-09-10 DIAGNOSIS — M62.81 MUSCLE WEAKNESS OF LEFT UPPER EXTREMITY: ICD-10-CM

## 2019-09-10 DIAGNOSIS — G89.29 CHRONIC UPPER BACK PAIN: ICD-10-CM

## 2019-09-10 DIAGNOSIS — J98.4 RESTRICTIVE LUNG DISEASE: ICD-10-CM

## 2019-09-10 DIAGNOSIS — M25.612 DECREASED ROM OF LEFT SHOULDER: ICD-10-CM

## 2019-09-10 DIAGNOSIS — M25.512 CHRONIC LEFT SHOULDER PAIN: ICD-10-CM

## 2019-09-13 ENCOUNTER — HOSPITAL ENCOUNTER (OUTPATIENT)
Dept: CT IMAGING | Facility: HOSPITAL | Age: 54
Discharge: HOME | End: 2019-09-13
Attending: INTERNAL MEDICINE

## 2019-09-13 DIAGNOSIS — J98.4 RESTRICTIVE LUNG DISEASE: ICD-10-CM

## 2019-09-16 ENCOUNTER — COMMUNICATION - HEALTHEAST (OUTPATIENT)
Dept: FAMILY MEDICINE | Facility: CLINIC | Age: 54
End: 2019-09-16

## 2019-09-16 ENCOUNTER — COMMUNICATION - HEALTHEAST (OUTPATIENT)
Dept: PULMONOLOGY | Facility: OTHER | Age: 54
End: 2019-09-16

## 2019-09-16 DIAGNOSIS — I10 BENIGN ESSENTIAL HYPERTENSION: ICD-10-CM

## 2019-09-19 ENCOUNTER — COMMUNICATION - HEALTHEAST (OUTPATIENT)
Dept: RHEUMATOLOGY | Facility: CLINIC | Age: 54
End: 2019-09-19

## 2019-11-19 ENCOUNTER — AMBULATORY - HEALTHEAST (OUTPATIENT)
Dept: LAB | Facility: CLINIC | Age: 54
End: 2019-11-19

## 2019-11-19 DIAGNOSIS — R70.0 ELEVATED SED RATE: ICD-10-CM

## 2019-11-19 DIAGNOSIS — R76.8 JO-1 ANTIBODY POSITIVE: ICD-10-CM

## 2019-11-19 LAB
BASOPHILS # BLD AUTO: 0 THOU/UL (ref 0–0.2)
BASOPHILS NFR BLD AUTO: 1 % (ref 0–2)
C REACTIVE PROTEIN LHE: 1.4 MG/DL (ref 0–0.8)
CK SERPL-CCNC: 107 U/L (ref 30–190)
EOSINOPHIL # BLD AUTO: 0.2 THOU/UL (ref 0–0.4)
EOSINOPHIL NFR BLD AUTO: 4 % (ref 0–6)
ERYTHROCYTE [DISTWIDTH] IN BLOOD BY AUTOMATED COUNT: 11.9 % (ref 11–14.5)
ERYTHROCYTE [SEDIMENTATION RATE] IN BLOOD BY WESTERGREN METHOD: 44 MM/HR (ref 0–20)
HCT VFR BLD AUTO: 38.4 % (ref 35–47)
HGB BLD-MCNC: 13 G/DL (ref 12–16)
LYMPHOCYTES # BLD AUTO: 2.1 THOU/UL (ref 0.8–4.4)
LYMPHOCYTES NFR BLD AUTO: 40 % (ref 20–40)
MCH RBC QN AUTO: 28.6 PG (ref 27–34)
MCHC RBC AUTO-ENTMCNC: 33.9 G/DL (ref 32–36)
MCV RBC AUTO: 84 FL (ref 80–100)
MONOCYTES # BLD AUTO: 0.6 THOU/UL (ref 0–0.9)
MONOCYTES NFR BLD AUTO: 12 % (ref 2–10)
NEUTROPHILS # BLD AUTO: 2.2 THOU/UL (ref 2–7.7)
NEUTROPHILS NFR BLD AUTO: 44 % (ref 50–70)
PLATELET # BLD AUTO: 183 THOU/UL (ref 140–440)
PMV BLD AUTO: 8.7 FL (ref 7–10)
RBC # BLD AUTO: 4.55 MILL/UL (ref 3.8–5.4)
WBC: 5.1 THOU/UL (ref 4–11)

## 2019-11-21 LAB
ALDOLASE SERPL-CCNC: 5.8 U/L (ref 1.5–8.1)
MYOGLOBIN SERPL-MCNC: 30 MCG/L

## 2019-11-22 ENCOUNTER — OFFICE VISIT - HEALTHEAST (OUTPATIENT)
Dept: FAMILY MEDICINE | Facility: CLINIC | Age: 54
End: 2019-11-22

## 2019-11-22 DIAGNOSIS — R20.2 PARESTHESIA: ICD-10-CM

## 2019-11-22 DIAGNOSIS — R21 RASH: ICD-10-CM

## 2019-11-22 DIAGNOSIS — J45.40 MODERATE PERSISTENT ASTHMA WITHOUT COMPLICATION: ICD-10-CM

## 2019-11-22 DIAGNOSIS — M35.00 SICCA SYNDROME (H): ICD-10-CM

## 2019-11-22 LAB
HBA1C MFR BLD: 6.2 % (ref 3.5–6)
VIT B12 SERPL-MCNC: 579 PG/ML (ref 213–816)

## 2019-11-22 ASSESSMENT — MIFFLIN-ST. JEOR: SCORE: 1471.06

## 2019-11-26 ENCOUNTER — OFFICE VISIT - HEALTHEAST (OUTPATIENT)
Dept: RHEUMATOLOGY | Facility: CLINIC | Age: 54
End: 2019-11-26

## 2019-11-26 DIAGNOSIS — R76.8 JO-1 ANTIBODY POSITIVE: ICD-10-CM

## 2019-11-26 DIAGNOSIS — R70.0 ELEVATED SED RATE: ICD-10-CM

## 2019-11-26 DIAGNOSIS — R20.2 PARESTHESIA OF BOTH HANDS: ICD-10-CM

## 2019-11-26 DIAGNOSIS — M79.18 MYOFASCIAL PAIN: ICD-10-CM

## 2019-11-26 ASSESSMENT — MIFFLIN-ST. JEOR: SCORE: 1486.94

## 2019-12-04 ENCOUNTER — COMMUNICATION - HEALTHEAST (OUTPATIENT)
Dept: FAMILY MEDICINE | Facility: CLINIC | Age: 54
End: 2019-12-04

## 2019-12-04 DIAGNOSIS — I10 BENIGN ESSENTIAL HYPERTENSION: ICD-10-CM

## 2019-12-05 ENCOUNTER — AMBULATORY - HEALTHEAST (OUTPATIENT)
Dept: FAMILY MEDICINE | Facility: CLINIC | Age: 54
End: 2019-12-05

## 2019-12-05 DIAGNOSIS — I10 BENIGN ESSENTIAL HYPERTENSION: ICD-10-CM

## 2020-01-08 ENCOUNTER — COMMUNICATION - HEALTHEAST (OUTPATIENT)
Dept: FAMILY MEDICINE | Facility: CLINIC | Age: 55
End: 2020-01-08

## 2020-01-08 DIAGNOSIS — I10 BENIGN ESSENTIAL HYPERTENSION: ICD-10-CM

## 2020-01-20 ENCOUNTER — COMMUNICATION - HEALTHEAST (OUTPATIENT)
Dept: FAMILY MEDICINE | Facility: CLINIC | Age: 55
End: 2020-01-20

## 2020-01-21 ENCOUNTER — AMBULATORY - HEALTHEAST (OUTPATIENT)
Dept: FAMILY MEDICINE | Facility: CLINIC | Age: 55
End: 2020-01-21

## 2020-01-21 DIAGNOSIS — J45.21 MILD INTERMITTENT ASTHMA WITH EXACERBATION: ICD-10-CM

## 2020-02-04 ENCOUNTER — OFFICE VISIT - HEALTHEAST (OUTPATIENT)
Dept: FAMILY MEDICINE | Facility: CLINIC | Age: 55
End: 2020-02-04

## 2020-02-04 DIAGNOSIS — C50.512 MALIGNANT NEOPLASM OF LOWER-OUTER QUADRANT OF LEFT FEMALE BREAST, UNSPECIFIED ESTROGEN RECEPTOR STATUS (H): ICD-10-CM

## 2020-02-04 DIAGNOSIS — M35.00 SICCA SYNDROME (H): ICD-10-CM

## 2020-02-04 DIAGNOSIS — D70.9 NEUTROPENIA, UNSPECIFIED TYPE (H): ICD-10-CM

## 2020-02-04 DIAGNOSIS — Z71.84 COUNSELING ABOUT TRAVEL: ICD-10-CM

## 2020-02-04 DIAGNOSIS — R59.1 LYMPHADENOPATHY: ICD-10-CM

## 2020-02-04 DIAGNOSIS — R21 RASH: ICD-10-CM

## 2020-02-04 LAB
ERYTHROCYTE [DISTWIDTH] IN BLOOD BY AUTOMATED COUNT: 12.8 % (ref 11–14.5)
HCT VFR BLD AUTO: 38.2 % (ref 35–47)
HGB BLD-MCNC: 13 G/DL (ref 12–16)
MCH RBC QN AUTO: 28.9 PG (ref 27–34)
MCHC RBC AUTO-ENTMCNC: 34 G/DL (ref 32–36)
MCV RBC AUTO: 85 FL (ref 80–100)
PLATELET # BLD AUTO: 182 THOU/UL (ref 140–440)
PMV BLD AUTO: 8.7 FL (ref 7–10)
RBC # BLD AUTO: 4.51 MILL/UL (ref 3.8–5.4)
WBC: 4.6 THOU/UL (ref 4–11)

## 2020-02-05 LAB
HBV SURFACE AB SERPL IA-ACNC: POSITIVE M[IU]/ML
MEV IGG SER IA-ACNC: POSITIVE
MUV IGG SER QL IA: POSITIVE
RUBV IGG SERPL QL IA: POSITIVE

## 2020-03-19 ENCOUNTER — COMMUNICATION - HEALTHEAST (OUTPATIENT)
Dept: FAMILY MEDICINE | Facility: CLINIC | Age: 55
End: 2020-03-19

## 2020-03-20 ENCOUNTER — COMMUNICATION - HEALTHEAST (OUTPATIENT)
Dept: FAMILY MEDICINE | Facility: CLINIC | Age: 55
End: 2020-03-20

## 2020-04-30 ENCOUNTER — COMMUNICATION - HEALTHEAST (OUTPATIENT)
Dept: FAMILY MEDICINE | Facility: CLINIC | Age: 55
End: 2020-04-30

## 2020-04-30 DIAGNOSIS — J45.40 MODERATE PERSISTENT ASTHMA WITHOUT COMPLICATION: ICD-10-CM

## 2020-09-28 ENCOUNTER — OFFICE VISIT - HEALTHEAST (OUTPATIENT)
Dept: RHEUMATOLOGY | Facility: CLINIC | Age: 55
End: 2020-09-28

## 2020-10-14 ENCOUNTER — OFFICE VISIT - HEALTHEAST (OUTPATIENT)
Dept: RHEUMATOLOGY | Facility: CLINIC | Age: 55
End: 2020-10-14

## 2020-10-14 DIAGNOSIS — R76.8 JO-1 ANTIBODY POSITIVE: ICD-10-CM

## 2020-10-14 DIAGNOSIS — M79.18 MYOFASCIAL PAIN: ICD-10-CM

## 2020-10-14 DIAGNOSIS — R68.89 SENSATION OF FEELING HOT: ICD-10-CM

## 2020-10-14 DIAGNOSIS — R20.2 PARESTHESIA OF BOTH HANDS: ICD-10-CM

## 2020-10-14 DIAGNOSIS — R70.0 ELEVATED SED RATE: ICD-10-CM

## 2020-10-15 ENCOUNTER — COMMUNICATION - HEALTHEAST (OUTPATIENT)
Dept: ADMINISTRATIVE | Facility: CLINIC | Age: 55
End: 2020-10-15

## 2020-10-15 ENCOUNTER — COMMUNICATION - HEALTHEAST (OUTPATIENT)
Dept: RHEUMATOLOGY | Facility: CLINIC | Age: 55
End: 2020-10-15

## 2020-10-16 ENCOUNTER — RECORDS - HEALTHEAST (OUTPATIENT)
Dept: ADMINISTRATIVE | Facility: CLINIC | Age: 55
End: 2020-10-16

## 2020-10-20 ENCOUNTER — COMMUNICATION - HEALTHEAST (OUTPATIENT)
Dept: FAMILY MEDICINE | Facility: CLINIC | Age: 55
End: 2020-10-20

## 2020-10-20 ENCOUNTER — OFFICE VISIT - HEALTHEAST (OUTPATIENT)
Dept: FAMILY MEDICINE | Facility: CLINIC | Age: 55
End: 2020-10-20

## 2020-10-20 DIAGNOSIS — Z00.00 ROUTINE GENERAL MEDICAL EXAMINATION AT A HEALTH CARE FACILITY: ICD-10-CM

## 2020-10-20 DIAGNOSIS — J45.40 MODERATE PERSISTENT ASTHMA WITHOUT COMPLICATION: ICD-10-CM

## 2020-10-20 DIAGNOSIS — E66.811 CLASS 1 OBESITY DUE TO EXCESS CALORIES WITH SERIOUS COMORBIDITY AND BODY MASS INDEX (BMI) OF 31.0 TO 31.9 IN ADULT: ICD-10-CM

## 2020-10-20 DIAGNOSIS — R68.89 SENSATION OF FEELING HOT: ICD-10-CM

## 2020-10-20 DIAGNOSIS — R76.8 JO-1 ANTIBODY POSITIVE: ICD-10-CM

## 2020-10-20 DIAGNOSIS — E78.2 MIXED HYPERLIPIDEMIA: ICD-10-CM

## 2020-10-20 DIAGNOSIS — M35.00 SICCA SYNDROME (H): ICD-10-CM

## 2020-10-20 DIAGNOSIS — Z12.31 ENCOUNTER FOR SCREENING MAMMOGRAM FOR BREAST CANCER: ICD-10-CM

## 2020-10-20 DIAGNOSIS — R70.0 ELEVATED SED RATE: ICD-10-CM

## 2020-10-20 DIAGNOSIS — R73.03 PREDIABETES: ICD-10-CM

## 2020-10-20 DIAGNOSIS — M25.549 PAIN IN MULTIPLE FINGER JOINTS: ICD-10-CM

## 2020-10-20 DIAGNOSIS — I10 BENIGN ESSENTIAL HYPERTENSION: ICD-10-CM

## 2020-10-20 DIAGNOSIS — E66.09 CLASS 1 OBESITY DUE TO EXCESS CALORIES WITH SERIOUS COMORBIDITY AND BODY MASS INDEX (BMI) OF 31.0 TO 31.9 IN ADULT: ICD-10-CM

## 2020-10-20 LAB
ALBUMIN SERPL-MCNC: 4 G/DL (ref 3.5–5)
ALT SERPL W P-5'-P-CCNC: 40 U/L (ref 0–45)
AST SERPL W P-5'-P-CCNC: 38 U/L (ref 0–40)
BASOPHILS # BLD AUTO: 0 THOU/UL (ref 0–0.2)
BASOPHILS NFR BLD AUTO: 1 % (ref 0–2)
C REACTIVE PROTEIN LHE: 0.8 MG/DL (ref 0–0.8)
CALCIUM SERPL-MCNC: 9.4 MG/DL (ref 8.5–10.5)
CHOLEST SERPL-MCNC: 223 MG/DL
CK SERPL-CCNC: 102 U/L (ref 30–190)
CREAT SERPL-MCNC: 0.7 MG/DL (ref 0.6–1.1)
EOSINOPHIL # BLD AUTO: 0.4 THOU/UL (ref 0–0.4)
EOSINOPHIL NFR BLD AUTO: 8 % (ref 0–6)
ERYTHROCYTE [DISTWIDTH] IN BLOOD BY AUTOMATED COUNT: 13.6 % (ref 11–14.5)
ERYTHROCYTE [SEDIMENTATION RATE] IN BLOOD BY WESTERGREN METHOD: 34 MM/HR (ref 0–20)
FASTING STATUS PATIENT QL REPORTED: YES
GFR SERPL CREATININE-BSD FRML MDRD: >60 ML/MIN/1.73M2
HBA1C MFR BLD: 6.1 %
HCT VFR BLD AUTO: 38.1 % (ref 35–47)
HDLC SERPL-MCNC: 61 MG/DL
HGB BLD-MCNC: 12.9 G/DL (ref 12–16)
LDLC SERPL CALC-MCNC: 141 MG/DL
LYMPHOCYTES # BLD AUTO: 2.1 THOU/UL (ref 0.8–4.4)
LYMPHOCYTES NFR BLD AUTO: 43 % (ref 20–40)
MCH RBC QN AUTO: 28.7 PG (ref 27–34)
MCHC RBC AUTO-ENTMCNC: 33.9 G/DL (ref 32–36)
MCV RBC AUTO: 85 FL (ref 80–100)
MONOCYTES # BLD AUTO: 0.4 THOU/UL (ref 0–0.9)
MONOCYTES NFR BLD AUTO: 8 % (ref 2–10)
NEUTROPHILS # BLD AUTO: 2 THOU/UL (ref 2–7.7)
NEUTROPHILS NFR BLD AUTO: 41 % (ref 50–70)
PLATELET # BLD AUTO: 178 THOU/UL (ref 140–440)
PMV BLD AUTO: 9 FL (ref 7–10)
RBC # BLD AUTO: 4.5 MILL/UL (ref 3.8–5.4)
TRIGL SERPL-MCNC: 103 MG/DL
TSH SERPL DL<=0.005 MIU/L-ACNC: 1.41 UIU/ML (ref 0.3–5)
WBC: 4.8 THOU/UL (ref 4–11)

## 2020-10-20 ASSESSMENT — MIFFLIN-ST. JEOR: SCORE: 1476.4

## 2020-10-21 LAB
25(OH)D3 SERPL-MCNC: 27.9 NG/ML (ref 30–80)
HPV SOURCE: NORMAL
HUMAN PAPILLOMA VIRUS 16 DNA: NEGATIVE
HUMAN PAPILLOMA VIRUS 18 DNA: NEGATIVE
HUMAN PAPILLOMA VIRUS FINAL DIAGNOSIS: NORMAL
HUMAN PAPILLOMA VIRUS OTHER HR: NEGATIVE
SPECIMEN DESCRIPTION: NORMAL

## 2020-10-23 LAB — ANA SER QL: 2.9 U

## 2020-10-29 LAB
BKR LAB AP ABNORMAL BLEEDING: NO
BKR LAB AP BIRTH CONTROL/HORMONES: NORMAL
BKR LAB AP CERVICAL APPEARANCE: NORMAL
BKR LAB AP GYN ADEQUACY: NORMAL
BKR LAB AP GYN INTERPRETATION: NORMAL
BKR LAB AP HPV REFLEX: NORMAL
BKR LAB AP LMP: NORMAL
BKR LAB AP PATIENT STATUS: NORMAL
BKR LAB AP PREVIOUS ABNORMAL: NORMAL
BKR LAB AP PREVIOUS NORMAL: 2015
HIGH RISK?: NO
PATH REPORT.COMMENTS IMP SPEC: NORMAL
RESULT FLAG (HE HISTORICAL CONVERSION): NORMAL

## 2020-11-02 ENCOUNTER — COMMUNICATION - HEALTHEAST (OUTPATIENT)
Dept: FAMILY MEDICINE | Facility: CLINIC | Age: 55
End: 2020-11-02

## 2020-11-02 ENCOUNTER — COMMUNICATION - HEALTHEAST (OUTPATIENT)
Dept: RHEUMATOLOGY | Facility: CLINIC | Age: 55
End: 2020-11-02

## 2020-11-02 DIAGNOSIS — R53.82 CHRONIC FATIGUE: ICD-10-CM

## 2020-11-02 DIAGNOSIS — M79.10 MYALGIA: ICD-10-CM

## 2020-11-02 DIAGNOSIS — G89.29 CHRONIC PAIN OF MULTIPLE JOINTS: ICD-10-CM

## 2020-11-02 DIAGNOSIS — M25.50 CHRONIC PAIN OF MULTIPLE JOINTS: ICD-10-CM

## 2020-11-02 DIAGNOSIS — R76.8 JO-1 ANTIBODY POSITIVE: ICD-10-CM

## 2020-11-02 DIAGNOSIS — M54.9 MID BACK PAIN, CHRONIC: ICD-10-CM

## 2020-11-02 DIAGNOSIS — G89.29 MID BACK PAIN, CHRONIC: ICD-10-CM

## 2020-11-02 DIAGNOSIS — R70.0 ELEVATED SED RATE: ICD-10-CM

## 2020-11-04 ENCOUNTER — COMMUNICATION - HEALTHEAST (OUTPATIENT)
Dept: RHEUMATOLOGY | Facility: CLINIC | Age: 55
End: 2020-11-04

## 2020-11-04 DIAGNOSIS — E55.9 VITAMIN D DEFICIENCY: ICD-10-CM

## 2020-12-09 ENCOUNTER — RECORDS - HEALTHEAST (OUTPATIENT)
Dept: ADMINISTRATIVE | Facility: OTHER | Age: 55
End: 2020-12-09

## 2020-12-09 ENCOUNTER — VIRTUAL VISIT (OUTPATIENT)
Dept: NEUROLOGY | Facility: CLINIC | Age: 55
End: 2020-12-09
Payer: COMMERCIAL

## 2020-12-09 ENCOUNTER — HOSPITAL ENCOUNTER (OUTPATIENT)
Dept: NUCLEAR MEDICINE | Facility: HOSPITAL | Age: 55
Discharge: HOME | End: 2020-12-09
Attending: INTERNAL MEDICINE

## 2020-12-09 VITALS — WEIGHT: 193 LBS | BODY MASS INDEX: 32.15 KG/M2 | HEIGHT: 65 IN

## 2020-12-09 DIAGNOSIS — G89.29 MID BACK PAIN, CHRONIC: ICD-10-CM

## 2020-12-09 DIAGNOSIS — R76.8 JO-1 ANTIBODY POSITIVE: ICD-10-CM

## 2020-12-09 DIAGNOSIS — M79.10 MYALGIA: ICD-10-CM

## 2020-12-09 DIAGNOSIS — R51.9 PRESSURE IN HEAD: ICD-10-CM

## 2020-12-09 DIAGNOSIS — G89.29 CHRONIC PAIN OF MULTIPLE JOINTS: ICD-10-CM

## 2020-12-09 DIAGNOSIS — R53.82 CHRONIC FATIGUE: ICD-10-CM

## 2020-12-09 DIAGNOSIS — R20.0 HAND NUMBNESS: Primary | ICD-10-CM

## 2020-12-09 DIAGNOSIS — M54.9 MID BACK PAIN, CHRONIC: ICD-10-CM

## 2020-12-09 DIAGNOSIS — R70.0 ELEVATED SED RATE: ICD-10-CM

## 2020-12-09 DIAGNOSIS — M25.50 CHRONIC PAIN OF MULTIPLE JOINTS: ICD-10-CM

## 2020-12-09 DIAGNOSIS — I67.1 CEREBRAL ANEURYSM, NONRUPTURED: ICD-10-CM

## 2020-12-09 PROBLEM — J98.4 RESTRICTIVE LUNG DISEASE: Status: ACTIVE | Noted: 2019-09-10

## 2020-12-09 PROBLEM — M35.00 SICCA SYNDROME (H): Status: ACTIVE | Noted: 2018-01-15

## 2020-12-09 PROBLEM — E78.2 MIXED HYPERLIPIDEMIA: Status: ACTIVE | Noted: 2018-02-22

## 2020-12-09 PROBLEM — R73.03 PREDIABETES: Status: ACTIVE | Noted: 2018-02-22

## 2020-12-09 PROCEDURE — 99245 OFF/OP CONSLTJ NEW/EST HI 55: CPT | Mod: 95 | Performed by: PSYCHIATRY & NEUROLOGY

## 2020-12-09 RX ORDER — LISINOPRIL 10 MG/1
TABLET ORAL
COMMUNITY
Start: 2020-10-15 | End: 2021-06-16

## 2020-12-09 RX ORDER — ALBUTEROL SULFATE 90 UG/1
2 AEROSOL, METERED RESPIRATORY (INHALATION)
COMMUNITY
Start: 2020-01-21 | End: 2021-08-04

## 2020-12-09 RX ORDER — ERGOCALCIFEROL 1.25 MG/1
CAPSULE, LIQUID FILLED ORAL
COMMUNITY
Start: 2020-11-05 | End: 2022-12-08

## 2020-12-09 ASSESSMENT — MIFFLIN-ST. JEOR
SCORE: 1456.32
SCORE: 1461.32

## 2020-12-09 NOTE — LETTER
"    12/9/2020         RE: Roverto Gonzalez  [address]  [place] [address]        Dear Colleague,    Thank you for referring your patient, Roverto Gonzalez, to the [facility] [place]. Please see a copy of my visit note below.    NEUROLOGY OUTPATIENT CONSULT NOTE (VIDEO)  Dec 9, 2020     CHIEF COMPLAINT/REASON FOR VISIT/REASON FOR CONSULT  Patient presents with:  Numbness:  Both hand numbness    REASON FOR CONSULTATION- Numbness    REFERRAL SOURCE  Dr. Finkelstein  CC Dr. Finkelstein    Video Visit Consent  Patient is being evaluated via a billable video visit. The patient has been notified of following:   \"This video visit will be conducted via a call between you and your physician/provider. We have found that certain health care needs can be provided without the need for an in-person physical exam. This service lets us provide the care you need with a video conversation. If a prescription is necessary we can send it directly to your pharmacy. If lab work is needed we can place an order for that and you can then stop by our lab to have the test done at a later time.  If during the course of the call the physician/provider feels a video visit is not appropriate, you will not be charged for this service.  Physician has received verbal consent for a Video Visit from the patient? YES  Patient would like the video invitation sent by: Email/SMS    Video Visit Details  Type of service: Video Visit  Video Start Time: 8:20  Video End Time (time video stopped): 8:45  Originating Location (pt. Location): Patient's Home  Distant Location (provider location): [facility] [place] [facility] [place]   Mode of Communication: Video Conference via [employer]        HISTORY OF PRESENT ILLNESS  Roverto Gonzalez is a 55 year old female seen today for evaluation of hand numbness.  Reports that the numbness came on in 3 months ago.  All 5 fingers are involved.  Is present on both sides.  The symptoms come and go.  " Washing the dishes does make the symptoms come on.  She does have some left-sided weakness as well.  She does have chronic neck pain more on the left side.  Denies any shooting pain down the arm.  She is done physical therapy in the past which is helped the neck pain.  Reports no recent falls or injuries.  Occasionally will have intermittent numbness in her legs here and there.    Patient does have a history of a previous aneurysm that was being managed by Dr. Novoas.  Patient reports no ongoing headaches there is continued some pressure in the left side of her head.  Has not had a repeat scan for several years.  No family history of aneurysm.  No smoking history.    Previous history is reviewed and this is unchanged.    PAST MEDICAL/SURGICAL HISTORY  Past Medical History:   Diagnosis Date     Breast cancer (H) 2014    lumpectomy on left with radiation      Hypertension      Patient Active Problem List   Diagnosis     Malignant neoplasm of left female breast, unspecified estrogen receptor status, unspecified site of breast (H)     Malignant neoplasm of lower-outer quadrant of female breast (H)     Vitamin D deficiency     Thyroid cyst     Restrictive lung disease     Prediabetes     Sicca syndrome (H)     Mixed hyperlipidemia       FAMILY HISTORY  Family History   Problem Relation Age of Onset     Cancer Father    Negative for neuropathy or aneurysm    SOCIAL HISTORY  Social History     Tobacco Use     Smoking status: Never Smoker     Smokeless tobacco: Never Used   Substance Use Topics     Alcohol use: No     Drug use: No       SYSTEMS REVIEW  Twelve-system ROS was done and other than the HPI this was negative.     MEDICATIONS       albuterol (PROAIR HFA/PROVENTIL HFA/VENTOLIN HFA) 108 (90 Base) MCG/ACT inhaler, Inhale 2 puffs into the lungs       lisinopril (ZESTRIL) 10 MG tablet,        vitamin D2 (ERGOCALCIFEROL) 65590 units (1250 mcg) capsule, TK 1 C PO Q 7 DAYS    No current facility-administered medications  "on file prior to visit.        PHYSICAL EXAMINATION  VITALS: Ht 1.651 m (5' 5\")   Wt 87.5 kg (193 lb)   BMI 32.12 kg/m    Exam was limited due to video encounter.    Vitals-Unable to do on video  GENERAL -Health appearing, No apparent distress  EYES- No scleral icterus, no eyelid droop, Pupils symmetric  HEENT - Normocephalic, atraumatic, Hearing grossly intact; Oral mucosa moist and pink in color. External Ears and nose intact.   Neck - soft/flexible with normal ROM on visual inspection.  PULM - Good spontaneous respiratory effort;  CV- No edema on visual inspection  MSK- Gait - see Neuro section; Strength and tone- see Neuro section; Range of motion grossly intact.  Psych- Normal mood and affect. Good judgment and insight.     Neurological  Mental status - Patient is awake and oriented. Attention span is normal. Language is fluent and follows commands appropriately.   Cranial nerves - Pupils are symmetric; EOMI, NLF symmetric  Motor - There is no pronator drift. Antigravity in all 4 ext.  Tone - No evidence of rigidity on visual inspection. No tremor.  Reflexes - Unable to do on video  Sensation - Unable to do on Video  Coordination - Finger to nose without dysmetria.   Gait and station - Romberg is negative. Gait is steady        DIAGNOSTICS  MRI  HEAD MRI:   1.  Stable exam.  2.  No mass, hemorrhage or stroke.  3.  Stable mild/moderate burden of nonspecific white matter T2   prolongation. The appearance is not consistent with demyelination.   Findings may be secondary to chronic small vessel ischemic change.     HEAD MRA:   1.  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 No further follow-up of this finding is recommended.  2.  No intradural aneurysm.  3.  No high-grade stenosis.    OUTSIDE RECORDS  Outside referral notes were reviewed and pertinent information has been summarized;-patient was referred on October 14, 2020.  Was seen by Dr. Naqvi.  Patient was " having joint pain.  Had elevated ESR and CRP.  Was also having some paresthesias in the forearm and fingertips.  This is more at night.  She negative Tinel's and Phalen's.  She is also been referred to neurology in the past but she did not pursue that.  Was seen by Dr. Mcclain in the past.  This was in July 2017.  Patient at the time was complaining of some back pain.  Patient did have an MRI which did show an aneurysm.  Patient was recommended to have a repeat scan in 5 years.  She was told to follow-up with her primary care doctor for her chronic back pain.    IMPRESSION/REPORT/PLAN  Hand numbness with neck pain  Cerebral aneurysm, nonruptured  Pressure in head    This is a 55 year old female with new onset of numbness in the hands.  Given that the symptoms are worse with moving the wrist this could be carpal tunnel syndrome we will get a EMG of both upper extremities to further evaluate.  Also get a MRI of the cervical spine given ongoing neck pain.  In order to follow-up the previous cerebral aneurysm in the inferior lateral left carotid siphon we will get a repeat MRA.  This should also help evaluate the head pressure.  Discussed about physical therapy the patient wants to hold off on that for right now.  I can see her back after the testing.    -     MR Cervical Spine w/o Contrast  -     EMG; Future  -     MRA Brain ([place]) wo Contrast    Return for Virtual or clinic, After testing.    Over 45 minutes were spent coordinating the care for the patient today.  More than 50% of the time was spent counseling, educating the patient.  Billing-4 data points and 4 problem points    Malik Del Cid MD  Neurologist  [facility]  Tel:- [phone]    This note was dictated using voice recognition software.  Any grammatical or context distortions are unintentional and inherent to the software.        Again, thank you for allowing me to participate in the care of your patient.         Sincerely,        Malik Del Cid MD

## 2020-12-09 NOTE — PROGRESS NOTES
"NEUROLOGY OUTPATIENT CONSULT NOTE (VIDEO)  Dec 9, 2020     CHIEF COMPLAINT/REASON FOR VISIT/REASON FOR CONSULT  Patient presents with:  Numbness:  Both hand numbness    REASON FOR CONSULTATION- Numbness    REFERRAL SOURCE  Dr. Finkelstein  CC Dr. Finkelstein    Video Visit Consent  Patient is being evaluated via a billable video visit. The patient has been notified of following:   \"This video visit will be conducted via a call between you and your physician/provider. We have found that certain health care needs can be provided without the need for an in-person physical exam. This service lets us provide the care you need with a video conversation. If a prescription is necessary we can send it directly to your pharmacy. If lab work is needed we can place an order for that and you can then stop by our lab to have the test done at a later time.  If during the course of the call the physician/provider feels a video visit is not appropriate, you will not be charged for this service.  Physician has received verbal consent for a Video Visit from the patient? YES  Patient would like the video invitation sent by: Email/SMS    Video Visit Details  Type of service: Video Visit  Video Start Time: 8:20  Video End Time (time video stopped): 8:45  Originating Location (pt. Location): Patient's Home  Distant Location (provider location): [facility] [place]   Mode of Communication: Video Conference [facility]        HISTORY OF PRESENT ILLNESS  Roverto Gonzalez is a 55 year old female seen today for evaluation of hand numbness.  Reports that the numbness came on in 3 months ago.  All 5 fingers are involved.  Is present on both sides.  The symptoms come and go.  Washing the dishes does make the symptoms come on.  She does have some left-sided weakness as well.  She does have chronic neck pain more on the left side.  Denies any shooting pain down the arm.  She is done physical therapy in the past which is helped the " "neck pain.  Reports no recent falls or injuries.  Occasionally will have intermittent numbness in her legs here and there.    Patient does have a history of a previous aneurysm that was being managed by Dr. Novoas.  Patient reports no ongoing headaches there is continued some pressure in the left side of her head.  Has not had a repeat scan for several years.  No family history of aneurysm.  No smoking history.    Previous history is reviewed and this is unchanged.    PAST MEDICAL/SURGICAL HISTORY  Past Medical History:   Diagnosis Date     Breast cancer (H) 2014    lumpectomy on left with radiation      Hypertension      Patient Active Problem List   Diagnosis     Malignant neoplasm of left female breast, unspecified estrogen receptor status, unspecified site of breast (H)     Malignant neoplasm of lower-outer quadrant of female breast (H)     Vitamin D deficiency     Thyroid cyst     Restrictive lung disease     Prediabetes     Sicca syndrome (H)     Mixed hyperlipidemia       FAMILY HISTORY  Family History   Problem Relation Age of Onset     Cancer Father    Negative for neuropathy or aneurysm    SOCIAL HISTORY  Social History     Tobacco Use     Smoking status: Never Smoker     Smokeless tobacco: Never Used   Substance Use Topics     Alcohol use: No     Drug use: No       SYSTEMS REVIEW  Twelve-system ROS was done and other than the HPI this was negative.     MEDICATIONS       albuterol (PROAIR HFA/PROVENTIL HFA/VENTOLIN HFA) 108 (90 Base) MCG/ACT inhaler, Inhale 2 puffs into the lungs       lisinopril (ZESTRIL) 10 MG tablet,        vitamin D2 (ERGOCALCIFEROL) 14504 units (1250 mcg) capsule, TK 1 C PO Q 7 DAYS    No current facility-administered medications on file prior to visit.        PHYSICAL EXAMINATION  VITALS: Ht 1.651 m (5' 5\")   Wt 87.5 kg (193 lb)   BMI 32.12 kg/m    Exam was limited due to video encounter.    Vitals-Unable to do on video  GENERAL -Health appearing, No apparent distress  EYES- No " scleral icterus, no eyelid droop, Pupils symmetric  HEENT - Normocephalic, atraumatic, Hearing grossly intact; Oral mucosa moist and pink in color. External Ears and nose intact.   Neck - soft/flexible with normal ROM on visual inspection.  PULM - Good spontaneous respiratory effort;  CV- No edema on visual inspection  MSK- Gait - see Neuro section; Strength and tone- see Neuro section; Range of motion grossly intact.  Psych- Normal mood and affect. Good judgment and insight.     Neurological  Mental status - Patient is awake and oriented. Attention span is normal. Language is fluent and follows commands appropriately.   Cranial nerves - Pupils are symmetric; EOMI, NLF symmetric  Motor - There is no pronator drift. Antigravity in all 4 ext.  Tone - No evidence of rigidity on visual inspection. No tremor.  Reflexes - Unable to do on video  Sensation - Unable to do on Video  Coordination - Finger to nose without dysmetria.   Gait and station - Romberg is negative. Gait is steady        DIAGNOSTICS  MRI  HEAD MRI:   1.  Stable exam.  2.  No mass, hemorrhage or stroke.  3.  Stable mild/moderate burden of nonspecific white matter T2   prolongation. The appearance is not consistent with demyelination.   Findings may be secondary to chronic small vessel ischemic change.     HEAD MRA:   1.  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 No further follow-up of this finding is recommended.  2.  No intradural aneurysm.  3.  No high-grade stenosis.    OUTSIDE RECORDS  Outside referral notes were reviewed and pertinent information has been summarized;-patient was referred on October 14, 2020.  Was seen by Dr. Naqvi.  Patient was having joint pain.  Had elevated ESR and CRP.  Was also having some paresthesias in the forearm and fingertips.  This is more at night.  She negative Tinel's and Phalen's.  She is also been referred to neurology in the past but she did not pursue that.  Was  seen by Dr. Cadena's in the past.  This was in July 2017.  Patient at the time was complaining of some back pain.  Patient did have an MRI which did show an aneurysm.  Patient was recommended to have a repeat scan in 5 years.  She was told to follow-up with her primary care doctor for her chronic back pain.    IMPRESSION/REPORT/PLAN  Hand numbness with neck pain  Cerebral aneurysm, nonruptured  Pressure in head    This is a 55 year old female with new onset of numbness in the hands.  Given that the symptoms are worse with moving the wrist this could be carpal tunnel syndrome we will get a EMG of both upper extremities to further evaluate.  Also get a MRI of the cervical spine given ongoing neck pain.  In order to follow-up the previous cerebral aneurysm in the inferior lateral left carotid siphon we will get a repeat MRA.  This should also help evaluate the head pressure.  Discussed about physical therapy the patient wants to hold off on that for right now.  I can see her back after the testing.    -     MR Cervical Spine w/o Contrast  -     EMG; Future  -     MRA Brain ([place] [facility] [place]) wo Contrast    Return for Virtual or clinic, After testing.    Over 45 minutes were spent coordinating the care for the patient today.  More than 50% of the time was spent counseling, educating the patient.  Billing-4 data points and 4 problem points    Malik Del Cid MD  Neurologist  [facility]  Tel:- [phone]    This note was dictated using voice recognition software.  Any grammatical or context distortions are unintentional and inherent to the software.

## 2020-12-09 NOTE — NURSING NOTE
Chief Complaint   Patient presents with     Numbness      Both hand numbness     Deanna Jules CMA on 12/9/2020 at 7:52 AM

## 2020-12-18 ENCOUNTER — OFFICE VISIT (OUTPATIENT)
Dept: ENDOCRINOLOGY | Facility: CLINIC | Age: 55
End: 2020-12-18
Payer: COMMERCIAL

## 2020-12-18 ENCOUNTER — APPOINTMENT (OUTPATIENT)
Dept: LAB | Facility: CLINIC | Age: 55
End: 2020-12-18
Payer: COMMERCIAL

## 2020-12-18 VITALS — HEART RATE: 86 BPM | DIASTOLIC BLOOD PRESSURE: 88 MMHG | RESPIRATION RATE: 14 BRPM | SYSTOLIC BLOOD PRESSURE: 140 MMHG

## 2020-12-18 DIAGNOSIS — R23.2 HOT FLASHES: Primary | ICD-10-CM

## 2020-12-18 PROCEDURE — 99204 OFFICE O/P NEW MOD 45 MIN: CPT | Performed by: INTERNAL MEDICINE

## 2020-12-18 NOTE — LETTER
12/18/2020         RE: Roverto Gonzalez  [address]  [facility] [address]        Dear Colleague,    Thank you for referring your patient, Roverto Gonzalez, to the [facility]. Please see a copy of my visit note below.    CC: Hot flashes    HPI:   Patient presents for evaluation of hot flashes.  Issue began about 4 months ago.   At the beginning it was every night and now is ~ 3 times a week.   It does not wake her from sleep.  She is not sweating but just feels warm.   Feels the warmest in the legs.   These episodes do not occur during the daytime.     Occasional palpitations not related to above.     Notes she has been holding her lisinopril the last week as she did not feel her home BP readings were that bad.   She also notes her fingers and toes were turning pink/red. Less pronounced since stopping lisinopril.     No tremors.   No vision changes.   No n/v/d, or constipation.   No lip swelling or dysphagia.     Notes on and off skin itching unrelated to above.     Menses ceased 5 years ago. She had drenching sweats at that time.     ROS: 10 point ROS neg other than the symptoms noted above in the HPI.    PMH:   Patient Active Problem List   Diagnosis     Malignant neoplasm of left female breast, unspecified estrogen receptor status, unspecified site of breast (H)     Malignant neoplasm of lower-outer quadrant of female breast (H)     Vitamin D deficiency     Thyroid cyst     Restrictive lung disease     Prediabetes     Sicca syndrome (H)     Mixed hyperlipidemia     Meds:  Current Outpatient Medications   Medication     albuterol (PROAIR HFA/PROVENTIL HFA/VENTOLIN HFA) 108 (90 Base) MCG/ACT inhaler     lisinopril (ZESTRIL) 10 MG tablet     vitamin D2 (ERGOCALCIFEROL) 79775 units (1250 mcg) capsule     No current facility-administered medications for this visit.      FHX:   No thyroid or adrenal disease.     SHX:  Non-smoker.     Exam:   Vital signs:      BP: (!) 140/88 Pulse: 86   Resp: 14   "          Estimated body mass index is 32.12 kg/m  as calculated from the following:    Height as of 12/9/20: 1.651 m (5' 5\").    Weight as of 12/9/20: 87.5 kg (193 lb).  Gen: In NAD.   HEENT: no proptosis or lid lag, EOMI, thyroid palpable w/o clear nodule.   Card: S1 S2 RRR no m/r/g. no LE edema.   Pulm: CTA b/l.   GI: NT ND +BS.   MSK: no gross deformities.   Derm: no rashes or lesions. Hands are unremarkable.   Neuro: no tremor, +2 DTR's.     A/P:   Hot flashes - These are not waking her and not causing diaphoresis. Her TSH is normal. Discussed possible use of estrogen/progesterone. She does have a history of breast cancer which raises concern and furthermore she is not particularly bothered by the heat. Main role for today is to ensure there is nothing dangerous causing this issue. There is no diarrhea to suggest carcinoid. With her HTN, hot flashes, and changes in skin color at the finger tips, I am most concerned about a potential pheochromocytoma.   -24 hour urine metanephrines and creatinine.       Rito Latif M.D          Again, thank you for allowing me to participate in the care of your patient.        Sincerely,        Rito Latif MD    "

## 2020-12-18 NOTE — PROGRESS NOTES
"CC: Hot flashes    HPI:   Patient presents for evaluation of hot flashes.  Issue began about 4 months ago.   At the beginning it was every night and now is ~ 3 times a week.   It does not wake her from sleep.  She is not sweating but just feels warm.   Feels the warmest in the legs.   These episodes do not occur during the daytime.     Occasional palpitations not related to above.     Notes she has been holding her lisinopril the last week as she did not feel her home BP readings were that bad.   She also notes her fingers and toes were turning pink/red. Less pronounced since stopping lisinopril.     No tremors.   No vision changes.   No n/v/d, or constipation.   No lip swelling or dysphagia.     Notes on and off skin itching unrelated to above.     Menses ceased 5 years ago. She had drenching sweats at that time.     ROS: 10 point ROS neg other than the symptoms noted above in the HPI.    PMH:   Patient Active Problem List   Diagnosis     Malignant neoplasm of left female breast, unspecified estrogen receptor status, unspecified site of breast (H)     Malignant neoplasm of lower-outer quadrant of female breast (H)     Vitamin D deficiency     Thyroid cyst     Restrictive lung disease     Prediabetes     Sicca syndrome (H)     Mixed hyperlipidemia     Meds:  Current Outpatient Medications   Medication     albuterol (PROAIR HFA/PROVENTIL HFA/VENTOLIN HFA) 108 (90 Base) MCG/ACT inhaler     lisinopril (ZESTRIL) 10 MG tablet     vitamin D2 (ERGOCALCIFEROL) 58123 units (1250 mcg) capsule     No current facility-administered medications for this visit.      FHX:   No thyroid or adrenal disease.     SHX:  Non-smoker.     Exam:   Vital signs:      BP: (!) 140/88 Pulse: 86   Resp: 14            Estimated body mass index is 32.12 kg/m  as calculated from the following:    Height as of 12/9/20: 1.651 m (5' 5\").    Weight as of 12/9/20: 87.5 kg (193 lb).  Gen: In NAD.   HEENT: no proptosis or lid lag, EOMI, thyroid palpable w/o " clear nodule.   Card: S1 S2 RRR no m/r/g. no LE edema.   Pulm: CTA b/l.   GI: NT ND +BS.   MSK: no gross deformities.   Derm: no rashes or lesions. Hands are unremarkable.   Neuro: no tremor, +2 DTR's.     A/P:   Hot flashes - These are not waking her and not causing diaphoresis. Her TSH is normal. Discussed possible use of estrogen/progesterone. She does have a history of breast cancer which raises concern and furthermore she is not particularly bothered by the heat. Main role for today is to ensure there is nothing dangerous causing this issue. There is no diarrhea to suggest carcinoid. With her HTN, hot flashes, and changes in skin color at the finger tips, I am most concerned about a potential pheochromocytoma.   -24 hour urine metanephrines and creatinine.       Rito Latif M.D

## 2020-12-18 NOTE — NURSING NOTE
"Chief Complaint   Patient presents with     Consult       Initial BP (!) 140/88 (BP Location: Right arm, Patient Position: Sitting, Cuff Size: Adult Large)   Pulse 86   Resp 14  Estimated body mass index is 32.12 kg/m  as calculated from the following:    Height as of 12/9/20: 1.651 m (5' 5\").    Weight as of 12/9/20: 87.5 kg (193 lb).  BP completed using cuff size: large  Medications and allergies reviewed.      Fatou BERNARD MA    "

## 2020-12-19 PROCEDURE — 82570 ASSAY OF URINE CREATININE: CPT | Performed by: INTERNAL MEDICINE

## 2020-12-19 PROCEDURE — 81050 URINALYSIS VOLUME MEASURE: CPT | Performed by: INTERNAL MEDICINE

## 2020-12-19 PROCEDURE — 83835 ASSAY OF METANEPHRINES: CPT | Mod: 90 | Performed by: INTERNAL MEDICINE

## 2020-12-19 PROCEDURE — 99000 SPECIMEN HANDLING OFFICE-LAB: CPT | Performed by: INTERNAL MEDICINE

## 2020-12-20 DIAGNOSIS — R23.2 HOT FLASHES: ICD-10-CM

## 2020-12-21 LAB
COLLECT DURATION TIME UR: 1 H
CREAT 24H UR-MRATE: 1.35 G/(24.H) (ref 0.8–1.8)
CREAT UR-MCNC: 51 MG/DL
SPECIMEN VOL UR: 2650 ML

## 2020-12-25 LAB
COLLECT DURATION TIME SPEC: 24 H
CREAT 24H UR-MRATE: 1510 MG/D (ref 500–1400)
CREAT UR-MCNC: 57 MG/DL
METANEPH 24H UR-MCNC: 27 UG/L
METANEPH 24H UR-MRATE: 72 UG/D (ref 36–229)
METANEPH+NORMETANEPH UR-IMP: ABNORMAL
METANEPH/CREAT 24H UR: 47 UG/G CRT (ref 0–300)
NORMETANEPHRINE 24H UR-MCNC: 153 UG/L
NORMETANEPHRINE 24H UR-MRATE: 405 UG/D (ref 95–650)
NORMETANEPHRINE/CREAT 24H UR: 268 UG/G CRT (ref 0–400)
SPECIMEN VOL ?TM UR: 2650 ML

## 2021-01-05 ENCOUNTER — DOCUMENTATION ONLY (OUTPATIENT)
Dept: OTHER | Facility: CLINIC | Age: 56
End: 2021-01-05

## 2021-01-05 ENCOUNTER — AMBULATORY - HEALTHEAST (OUTPATIENT)
Dept: OTHER | Facility: CLINIC | Age: 56
End: 2021-01-05

## 2021-02-23 ENCOUNTER — HOSPITAL ENCOUNTER (OUTPATIENT)
Dept: MRI IMAGING | Facility: HOSPITAL | Age: 56
Discharge: HOME | End: 2021-02-23
Attending: PSYCHIATRY & NEUROLOGY

## 2021-02-23 ENCOUNTER — HOSPITAL ENCOUNTER (OUTPATIENT)
Dept: MAMMOGRAPHY | Facility: CLINIC | Age: 56
Discharge: HOME | End: 2021-02-23
Attending: NURSE PRACTITIONER

## 2021-02-23 DIAGNOSIS — Z12.31 ENCOUNTER FOR SCREENING MAMMOGRAM FOR BREAST CANCER: ICD-10-CM

## 2021-02-23 DIAGNOSIS — R20.0 HAND NUMBNESS: ICD-10-CM

## 2021-02-23 DIAGNOSIS — R51.9 PRESSURE IN HEAD: ICD-10-CM

## 2021-02-23 DIAGNOSIS — I67.1 CEREBRAL ANEURYSM, NONRUPTURED: ICD-10-CM

## 2021-02-25 ENCOUNTER — RECORDS - HEALTHEAST (OUTPATIENT)
Dept: ADMINISTRATIVE | Facility: OTHER | Age: 56
End: 2021-02-25

## 2021-02-25 ENCOUNTER — RECORDS - HEALTHEAST (OUTPATIENT)
Dept: RADIOLOGY | Facility: CLINIC | Age: 56
End: 2021-02-25

## 2021-03-08 ENCOUNTER — OFFICE VISIT - HEALTHEAST (OUTPATIENT)
Dept: FAMILY MEDICINE | Facility: CLINIC | Age: 56
End: 2021-03-08

## 2021-03-08 DIAGNOSIS — J45.40 MODERATE PERSISTENT ASTHMA WITHOUT COMPLICATION: ICD-10-CM

## 2021-03-08 DIAGNOSIS — R59.1 LYMPHADENOPATHY: ICD-10-CM

## 2021-03-08 DIAGNOSIS — R05.9 COUGH: ICD-10-CM

## 2021-03-08 DIAGNOSIS — I10 BENIGN ESSENTIAL HYPERTENSION: ICD-10-CM

## 2021-03-08 DIAGNOSIS — C50.512 MALIGNANT NEOPLASM OF LOWER-OUTER QUADRANT OF LEFT FEMALE BREAST, UNSPECIFIED ESTROGEN RECEPTOR STATUS (H): ICD-10-CM

## 2021-03-08 DIAGNOSIS — M35.00 SICCA SYNDROME (H): ICD-10-CM

## 2021-03-08 LAB
ERYTHROCYTE [DISTWIDTH] IN BLOOD BY AUTOMATED COUNT: 11.9 % (ref 11–14.5)
HCT VFR BLD AUTO: 38.1 % (ref 35–47)
HGB BLD-MCNC: 12.9 G/DL (ref 12–16)
MCH RBC QN AUTO: 28.2 PG (ref 27–34)
MCHC RBC AUTO-ENTMCNC: 33.9 G/DL (ref 32–36)
MCV RBC AUTO: 83 FL (ref 80–100)
PLATELET # BLD AUTO: 193 THOU/UL (ref 140–440)
PMV BLD AUTO: 11.4 FL (ref 7–10)
RBC # BLD AUTO: 4.57 MILL/UL (ref 3.8–5.4)
WBC: 5.2 THOU/UL (ref 4–11)

## 2021-03-09 ENCOUNTER — OFFICE VISIT (OUTPATIENT)
Dept: NEUROLOGY | Facility: CLINIC | Age: 56
End: 2021-03-09
Attending: PSYCHIATRY & NEUROLOGY
Payer: COMMERCIAL

## 2021-03-09 DIAGNOSIS — R20.0 HAND NUMBNESS: ICD-10-CM

## 2021-03-09 PROCEDURE — 95886 MUSC TEST DONE W/N TEST COMP: CPT | Mod: RT | Performed by: PSYCHIATRY & NEUROLOGY

## 2021-03-09 PROCEDURE — 95910 NRV CNDJ TEST 7-8 STUDIES: CPT | Performed by: PSYCHIATRY & NEUROLOGY

## 2021-03-09 NOTE — PROCEDURES
[facility]     Formerly [facility]  [address] [place] [address], [address]  [place] [address]  Tel: [phone]  Fax: [phone]          Full Name: Roverto Gonzalez Gender: Female  Patient ID: [id number] YOB: 1965      Visit Date: 3/9/2021 12:42  Age: 56 Years 2 Months Old  Interpreted By: Malik Del Cid MD   Ref Dr.: Anisha Ramirez MD  Tech:    Height: 5 feet 5 inch  Reason for referral: Evaluate bilateral uppers. c/o numbness in both hands > 1 year. Left > Right.       Motor NCS      Nerve / Sites Lat Amp Dist Aguilar    ms mV cm m/s   L Median - APB      Wrist 3.33 12.3 7       Elbow 7.86 11.2 26 57   R Median - APB      Wrist 3.49 11.1 7       Elbow 8.02 10.2 26 57   L Ulnar - ADM      Wrist 2.71 11.2 7       B.Elbow 5.83 10.5 23 74      A.Elbow 7.86 9.3 10 49       F  Wave      Nerve Fmin    ms   L Ulnar - ADM 27.76       Sensory NCS      Nerve / Sites Onset Lat Pk Lat Amp.2-3 Dist Aguilar Lat Diff    ms ms  V cm m/s ms   L Median - II (Antidr)      Wrist 2.19 2.92 53.6 13 59    R Median - II (Antidr)      Wrist 2.29 2.86 52.5 13 57    L Ulnar - V (Antidr)      Wrist 1.98 2.55 44.6 11 56    L Median, Ulnar - Transcarpal comparison      Median Palm 1.51 1.93 73.6 8 53       Ulnar Palm 1.30 1.61 45.8 8 61          0.31   R Median, Ulnar - Transcarpal comparison      Median Palm 1.51 1.98 56.8 8 53       Ulnar Palm 1.51 1.88 27.9 8 53          0.10       EMG Summary Table     Spontaneous MUAP Rcmt Note   Muscle Fib PSW Fasc IA # Amp Dur PPP Rate Type   L. Brachioradialis None None None N N N N N N N   L. Pronator teres None None None N N N N N N N   L. Biceps brachii None None None N N N N N N N   L. Deltoid None None None N N N N N N N   L. Triceps brachii None None None N N N N N N N   L. Extensor digitorum communis None None None N N N N N N N   L. Flexor carpi ulnaris None None None N N N N N N N   L. Abductor digiti minimi (manus) None None None N Sl  Red Incr Incr N N N   L. First dorsal interosseous None None None N Sl Red Incr Incr N N N   L. Abductor pollicis brevis None None None N N N N N N N   R. Brachioradialis None None None N N N N N N N   R. Pronator teres None None None N N N N N N N   R. Biceps brachii None None None N N N N N N N   R. Triceps brachii None None None N N N N N N N   R. First dorsal interosseous None None None N N N N N N N   R. Abductor pollicis brevis None None None N N N N N N N       SUMMARY  Nerve conduction and EMG study of bilateral upper extremities shows:    Normal right median nerve distal motor latency, amplitude and conduction velocity.  Normal left median nerve distal motor latency, amplitude and conduction velocity.  Normal left ulnar distal motor latency, amplitude and conduction velocity.  Normal left ulnar F wave latency.  Normal bilateral median and left ulnar sensory SNAPs.  Normal bilateral transcarpal median-ulnar peak latency comparison.  Monopolar needle exam as above.      CLINICAL INTERPRETATION:  This is an abnormal nerve conduction and EMG study.  The needle study shows chronic left ulnar denervation but no active denervation findings.  Further clinical correlation is needed.     Malik Del Cid MD  Neurologist  [facility]  Tel:- [phone]

## 2021-03-09 NOTE — LETTER
3/9/2021         RE: Roverto Gonzalez  [address]  [facility] [address]        Dear Colleague,    Thank you for referring your patient, Roverto Gonzalez, to the [facility] [place]. Please see a copy of my visit note below.    See procedure note.       Again, thank you for allowing me to participate in the care of your patient.        Sincerely,        Malik Del Cid MD

## 2021-03-16 ENCOUNTER — OFFICE VISIT (OUTPATIENT)
Dept: NEUROLOGY | Facility: CLINIC | Age: 56
End: 2021-03-16
Payer: COMMERCIAL

## 2021-03-16 VITALS
HEART RATE: 60 BPM | SYSTOLIC BLOOD PRESSURE: 126 MMHG | BODY MASS INDEX: 32.15 KG/M2 | HEIGHT: 65 IN | WEIGHT: 193 LBS | DIASTOLIC BLOOD PRESSURE: 69 MMHG

## 2021-03-16 DIAGNOSIS — H93.8X2 PRESSURE SENSATION IN LEFT EAR: ICD-10-CM

## 2021-03-16 DIAGNOSIS — I10 ESSENTIAL HYPERTENSION: ICD-10-CM

## 2021-03-16 DIAGNOSIS — R20.0 HAND NUMBNESS: Primary | ICD-10-CM

## 2021-03-16 DIAGNOSIS — I67.1 CEREBRAL ANEURYSM, NONRUPTURED: ICD-10-CM

## 2021-03-16 PROCEDURE — 99215 OFFICE O/P EST HI 40 MIN: CPT | Performed by: PSYCHIATRY & NEUROLOGY

## 2021-03-16 ASSESSMENT — MIFFLIN-ST. JEOR: SCORE: 1456.32

## 2021-03-16 NOTE — PROGRESS NOTES
NEUROLOGY OUTPATIENT PROGRESS NOTE  Mar 16, 2021     CHIEF COMPLAINT/REASON FOR VISIT/REASON FOR CONSULT  Patient presents with:  Follow Up  Cerebral aneurysm, nonruptured  Numbness    REASON FOR CONSULTATION- Numbness    REFERRAL SOURCE  Dr. Finkelstein  CC Dr. Finkelstein    HISTORY OF PRESENT ILLNESS  Roverto Gonzalez is a 56 year old female seen today for evaluation of hand numbness.  Reports that the numbness came on in 3 months ago.  All 5 fingers are involved.  Is present on both sides.  The symptoms come and go.  Washing the dishes does make the symptoms come on.  She does have some left-sided weakness as well.  She does have chronic neck pain more on the left side.  Denies any shooting pain down the arm.  She is done physical therapy in the past which is helped the neck pain.  Reports no recent falls or injuries.  Occasionally will have intermittent numbness in her legs here and there.    Patient does have a history of a previous aneurysm that was being managed by Dr. Mcclain.  Patient reports no ongoing headaches there is continued some pressure in the left side of her head.  Has not had a repeat scan for several years.  No family history of aneurysm.  No smoking history.    3/16/21  Patient returns today.  Reports that he continues to have hand numbness and hand pain.  This is mainly in the left hand in the fourth and fifth digit.  She continues to have neck pain as well.  Further reports of pressure in her ear.  Reports no headaches.  Reports it is mainly in the year.  She further was identified to have a aneurysm on her MRA.  Discussed significance of this.  Discussed treatment options.  Discussed about routine surveillance.    She has been working with her primary care doctor to get the blood pressure under good control.  She was having some dizziness due to high blood pressure medication and this has been decreased.  She further reports no's history of aneurysm risk factors.  No family history of aneurysms.  " No history of tobacco abuse.  Reports no headaches.    Ear pressure is in the ear.  Further reports tinnitus.  Reports no headaches.    Previous history is reviewed and this is unchanged.    PAST MEDICAL/SURGICAL HISTORY  Past Medical History:   Diagnosis Date     Breast cancer (H) 2014    lumpectomy on left with radiation      Hypertension      Patient Active Problem List   Diagnosis     Malignant neoplasm of left female breast, unspecified estrogen receptor status, unspecified site of breast (H)     Malignant neoplasm of lower-outer quadrant of female breast (H)     Vitamin D deficiency     Thyroid cyst     Restrictive lung disease     Prediabetes     Sicca syndrome (H)     Mixed hyperlipidemia       FAMILY HISTORY  Family History   Problem Relation Age of Onset     Cancer Father    Negative for neuropathy or aneurysm    SOCIAL HISTORY  Social History     Tobacco Use     Smoking status: Never Smoker     Smokeless tobacco: Never Used   Substance Use Topics     Alcohol use: No     Drug use: No       SYSTEMS REVIEW  Twelve-system ROS was done and other than the HPI this was negative.     MEDICATIONS  albuterol (PROAIR HFA/PROVENTIL HFA/VENTOLIN HFA) 108 (90 Base) MCG/ACT inhaler, Inhale 2 puffs into the lungs  vitamin D2 (ERGOCALCIFEROL) 41708 units (1250 mcg) capsule, TK 1 C PO Q 7 DAYS  lisinopril (ZESTRIL) 10 MG tablet,     No current facility-administered medications on file prior to visit.        PHYSICAL EXAMINATION  VITALS: /69   Pulse 60   Ht 1.651 m (5' 5\")   Wt 87.5 kg (193 lb)   BMI 32.12 kg/m    GENERAL: Healthy appearing, alert, no acute distress, normal habitus.  CARDIOVASCULAR: Extremities warm and well-perfused.  NEUROLOGICAL:  Patient is awake and oriented to self, place and time.  Attention span is normal.  Memory is grossly intact.  Language is fluent and follows commands appropriately.  Appropriate fund of knowledge. Cranial nerves 2-12 are intact. There is no pronator drift.  Motor " exam shows 5/5 strength in all extremities.  Tone is symmetric bilaterally in upper and lower extremities.  Reflexes are symmetric and 2+ in upper extremities and lower extremities. Sensory exam is grossly intact to light touch, pin prick and vibration.  Finger to nose and heel to shin is without dysmetria.  Romberg is negative.  Gait is normal and the patient is able to do tandem walk and walk on toes and heels.     DIAGNOSTICS  MRI  HEAD MRI:   1.  Stable exam.  2.  No mass, hemorrhage or stroke.  3.  Stable mild/moderate burden of nonspecific white matter T2   prolongation. The appearance is not consistent with demyelination.   Findings may be secondary to chronic small vessel ischemic change.     HEAD MRA:   1.  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 No further follow-up of this finding is recommended.  2.  No intradural aneurysm.  3.  No high-grade stenosis.    OUTSIDE RECORDS  Outside referral notes were reviewed and pertinent information has been summarized;-patient was referred on October 14, 2020.  Was seen by Dr. Naqvi.  Patient was having joint pain.  Had elevated ESR and CRP.  Was also having some paresthesias in the forearm and fingertips.  This is more at night.  She negative Tinel's and Phalen's.  She is also been referred to neurology in the past but she did not pursue that.  Was seen by Dr. Mcclain in the past.  This was in July 2017.  Patient at the time was complaining of some back pain.  Patient did have an MRI which did show an aneurysm.  Patient was recommended to have a repeat scan in 5 years.  She was told to follow-up with her primary care doctor for her chronic back pain.    EMG     CLINICAL INTERPRETATION:  This is an abnormal nerve conduction and EMG study.  The needle study shows chronic left ulnar denervation but no active denervation findings.  Further clinical correlation is needed.     MRI C spine - images reviewed.  IMPRESSION:    1.  Minimal cervical spondylosis without spinal canal or foraminal stenosis. No spinal cord signal abnormality.    MRA - images personally reviewed  IMPRESSION:   1.  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   2.  Unchanged extradural 1-2 mm proximal cavernous left internal carotid artery aneurysm.  3.  Focal 2 mm outpouching arising msouedially from the right A1-2 junction likely represents the normal anterior commuting indicating artery rather than a true saccular aneurysm.    IMPRESSION/REPORT/PLAN  Hand numbness with neck pain  Cerebral aneurysm, nonruptured  Pressure in ear  History of hypertension    This is a 56 year old female with new onset of numbness in the hands.  MRI cervical spine was noncontributory.  EMG does show evidence of chronic left ulnar denervation though no active entrapment neuropathy.  I would recommend she work with physical therapy to see if that would help her symptoms.  We discussed gabapentin as a treatment option though she wants to hold off on medication and that would not cure her symptoms.    Her MRI did show a left MCA 3 mm aneurysm.  She also has extra dural cavernous left ICA aneurysm.  Discussed the significance of these.  Since he is a stable we will hold off on surgical options.  She would need to get another scan every year.  Further discussed her risk factors.  She does not smoke.  There is no family history of aneurysms.  She does have a history of hypertension which could be a risk factor here.  Encourage her to work with the primary care doctor get the hypertension under good control.    In terms of her ear pressure I put in a referral for ENT.  She does not have a headache and this is not a head pressure.    Follow back in 3 months.    -     PHYSICAL THERAPY REFERRAL; Future  -     OTOLARYNGOLOGY REFERRAL; Future    Return in about 3 months (around 6/16/2021) for In-Clinic Visit.    Over 40 minutes were spent coordinating the care for the  patient on the day of the encounter.  This includes previsit, during visit and post visit activities as documented above.  (Activities include but not inclusive of reviewing chart, reviewing outside records, reviewing labs and imaging study results as well as the images, patient visit time including getting history and exam,  use if applicable, review of test results with the patient and coming up with a plan in a shared model, counseling patient and family, education and answering patient questions, EMR , EMR diagnosis entry and problem list management, medication reconciliation and prescription management if applicable, paperwork if applicable, printing documents and documentation of the visit activities.)      Malik Del Cid MD  Neurologist  [facility] [place] [facility]  Tel:- [phone]    This note was dictated using voice recognition software.  Any grammatical or context distortions are unintentional and inherent to the software.

## 2021-03-16 NOTE — LETTER
3/16/2021         RE: Roverto Gonzalez  [address]  [facility] [place] [facility] [address]        Dear Colleague,    Thank you for referring your patient, Roverto Gonzalez, to the [facility] [place]. Please see a copy of my visit note below.    NEUROLOGY OUTPATIENT PROGRESS NOTE  Mar 16, 2021     CHIEF COMPLAINT/REASON FOR VISIT/REASON FOR CONSULT  Patient presents with:  Follow Up  Cerebral aneurysm, nonruptured  Numbness    REASON FOR CONSULTATION- Numbness    REFERRAL SOURCE  Dr. Finkelstein  CC Dr. Finkelstein    HISTORY OF PRESENT ILLNESS  Roverto Gonzalez is a 56 year old female seen today for evaluation of hand numbness.  Reports that the numbness came on in 3 months ago.  All 5 fingers are involved.  Is present on both sides.  The symptoms come and go.  Washing the dishes does make the symptoms come on.  She does have some left-sided weakness as well.  She does have chronic neck pain more on the left side.  Denies any shooting pain down the arm.  She is done physical therapy in the past which is helped the neck pain.  Reports no recent falls or injuries.  Occasionally will have intermittent numbness in her legs here and there.    Patient does have a history of a previous aneurysm that was being managed by Dr. Cadena's.  Patient reports no ongoing headaches there is continued some pressure in the left side of her head.  Has not had a repeat scan for several years.  No family history of aneurysm.  No smoking history.    3/16/21  Patient returns today.  Reports that he continues to have hand numbness and hand pain.  This is mainly in the left hand in the fourth and fifth digit.  She continues to have neck pain as well.  Further reports of pressure in her ear.  Reports no headaches.  Reports it is mainly in the year.  She further was identified to have a aneurysm on her MRA.  Discussed significance of this.  Discussed treatment options.  Discussed about routine surveillance.    She has been working with her  "primary care doctor to get the blood pressure under good control.  She was having some dizziness due to high blood pressure medication and this has been decreased.  She further reports no's history of aneurysm risk factors.  No family history of aneurysms.  No history of tobacco abuse.  Reports no headaches.    Ear pressure is in the ear.  Further reports tinnitus.  Reports no headaches.    Previous history is reviewed and this is unchanged.    PAST MEDICAL/SURGICAL HISTORY  Past Medical History:   Diagnosis Date     Breast cancer (H) 2014    lumpectomy on left with radiation      Hypertension      Patient Active Problem List   Diagnosis     Malignant neoplasm of left female breast, unspecified estrogen receptor status, unspecified site of breast (H)     Malignant neoplasm of lower-outer quadrant of female breast (H)     Vitamin D deficiency     Thyroid cyst     Restrictive lung disease     Prediabetes     Sicca syndrome (H)     Mixed hyperlipidemia       FAMILY HISTORY  Family History   Problem Relation Age of Onset     Cancer Father    Negative for neuropathy or aneurysm    SOCIAL HISTORY  Social History     Tobacco Use     Smoking status: Never Smoker     Smokeless tobacco: Never Used   Substance Use Topics     Alcohol use: No     Drug use: No       SYSTEMS REVIEW  Twelve-system ROS was done and other than the HPI this was negative.     MEDICATIONS  albuterol (PROAIR HFA/PROVENTIL HFA/VENTOLIN HFA) 108 (90 Base) MCG/ACT inhaler, Inhale 2 puffs into the lungs  vitamin D2 (ERGOCALCIFEROL) 06486 units (1250 mcg) capsule, TK 1 C PO Q 7 DAYS  lisinopril (ZESTRIL) 10 MG tablet,     No current facility-administered medications on file prior to visit.        PHYSICAL EXAMINATION  VITALS: /69   Pulse 60   Ht 1.651 m (5' 5\")   Wt 87.5 kg (193 lb)   BMI 32.12 kg/m    GENERAL: Healthy appearing, alert, no acute distress, normal habitus.  CARDIOVASCULAR: Extremities warm and well-perfused.  NEUROLOGICAL:  Patient is " awake and oriented to self, place and time.  Attention span is normal.  Memory is grossly intact.  Language is fluent and follows commands appropriately.  Appropriate fund of knowledge. Cranial nerves 2-12 are intact. There is no pronator drift.  Motor exam shows 5/5 strength in all extremities.  Tone is symmetric bilaterally in upper and lower extremities.  Reflexes are symmetric and 2+ in upper extremities and lower extremities. Sensory exam is grossly intact to light touch, pin prick and vibration.  Finger to nose and heel to shin is without dysmetria.  Romberg is negative.  Gait is normal and the patient is able to do tandem walk and walk on toes and heels.     DIAGNOSTICS  MRI  HEAD MRI:   1.  Stable exam.  2.  No mass, hemorrhage or stroke.  3.  Stable mild/moderate burden of nonspecific white matter T2   prolongation. The appearance is not consistent with demyelination.   Findings may be secondary to chronic small vessel ischemic change.     HEAD MRA:   1.  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 No further follow-up of this finding is recommended.  2.  No intradural aneurysm.  3.  No high-grade stenosis.    OUTSIDE RECORDS  Outside referral notes were reviewed and pertinent information has been summarized;-patient was referred on October 14, 2020.  Was seen by Dr. Naqvi.  Patient was having joint pain.  Had elevated ESR and CRP.  Was also having some paresthesias in the forearm and fingertips.  This is more at night.  She negative Tinel's and Phalen's.  She is also been referred to neurology in the past but she did not pursue that.  Was seen by Dr. Mcclain in the past.  This was in July 2017.  Patient at the time was complaining of some back pain.  Patient did have an MRI which did show an aneurysm.  Patient was recommended to have a repeat scan in 5 years.  She was told to follow-up with her primary care doctor for her chronic back pain.    EMG     CLINICAL  INTERPRETATION:  This is an abnormal nerve conduction and EMG study.  The needle study shows chronic left ulnar denervation but no active denervation findings.  Further clinical correlation is needed.     MRI C spine - images reviewed.  IMPRESSION:   1.  Minimal cervical spondylosis without spinal canal or foraminal stenosis. No spinal cord signal abnormality.    MRA - images personally reviewed  IMPRESSION:   1.  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   2.  Unchanged extradural 1-2 mm proximal cavernous left internal carotid artery aneurysm.  3.  Focal 2 mm outpouching arising msouedially from the right A1-2 junction likely represents the normal anterior commuting indicating artery rather than a true saccular aneurysm.    IMPRESSION/REPORT/PLAN  Hand numbness with neck pain  Cerebral aneurysm, nonruptured  Pressure in ear  History of hypertension    This is a 56 year old female with new onset of numbness in the hands.  MRI cervical spine was noncontributory.  EMG does show evidence of chronic left ulnar denervation though no active entrapment neuropathy.  I would recommend she work with physical therapy to see if that would help her symptoms.  We discussed gabapentin as a treatment option though she wants to hold off on medication and that would not cure her symptoms.    Her MRI did show a left MCA 3 mm aneurysm.  She also has extra dural cavernous left ICA aneurysm.  Discussed the significance of these.  Since he is a stable we will hold off on surgical options.  She would need to get another scan every year.  Further discussed her risk factors.  She does not smoke.  There is no family history of aneurysms.  She does have a history of hypertension which could be a risk factor here.  Encourage her to work with the primary care doctor get the hypertension under good control.    In terms of her ear pressure I put in a referral for ENT.  She does not have a headache and this is not a head  pressure.    Follow back in 3 months.    -     PHYSICAL THERAPY REFERRAL; Future  -     OTOLARYNGOLOGY REFERRAL; Future    Return in about 3 months (around 6/16/2021) for In-Clinic Visit.    Over 40 minutes were spent coordinating the care for the patient on the day of the encounter.  This includes previsit, during visit and post visit activities as documented above.  (Activities include but not inclusive of reviewing chart, reviewing outside records, reviewing labs and imaging study results as well as the images, patient visit time including getting history and exam,  use if applicable, review of test results with the patient and coming up with a plan in a shared model, counseling patient and family, education and answering patient questions, EMR , EMR diagnosis entry and problem list management, medication reconciliation and prescription management if applicable, paperwork if applicable, printing documents and documentation of the visit activities.)      Malik Del Cid MD  Neurologist  [facility]  Tel:- [phone]    This note was dictated using voice recognition software.  Any grammatical or context distortions are unintentional and inherent to the software.        Again, thank you for allowing me to participate in the care of your patient.        Sincerely,        Malik Del Cid MD

## 2021-05-09 ENCOUNTER — COMMUNICATION - HEALTHEAST (OUTPATIENT)
Dept: FAMILY MEDICINE | Facility: CLINIC | Age: 56
End: 2021-05-09

## 2021-05-09 DIAGNOSIS — J45.40 MODERATE PERSISTENT ASTHMA WITHOUT COMPLICATION: ICD-10-CM

## 2021-05-20 ENCOUNTER — OFFICE VISIT - HEALTHEAST (OUTPATIENT)
Dept: FAMILY MEDICINE | Facility: CLINIC | Age: 56
End: 2021-05-20

## 2021-05-20 DIAGNOSIS — R05.9 COUGH: ICD-10-CM

## 2021-05-24 ENCOUNTER — RECORDS - HEALTHEAST (OUTPATIENT)
Dept: ADMINISTRATIVE | Facility: CLINIC | Age: 56
End: 2021-05-24

## 2021-05-25 ENCOUNTER — RECORDS - HEALTHEAST (OUTPATIENT)
Dept: ADMINISTRATIVE | Facility: CLINIC | Age: 56
End: 2021-05-25

## 2021-05-27 NOTE — PROGRESS NOTES
HISTORY OF PRESENT ILLNESS  Asked to bee seen by MAYI Ramirez for evaluation of ear symptoms.  She has had the ear pressure for 4 months.She describes fullness in the left side of her head. No pain in the ear, just ear pressure. No change in her hearing. No ear drainage. No recent ear infection or fluid. She has had onset of dizziness with periodic sudden loss of balance. No headaches.     REVIEW OF SYSTEMS  Review of Systems: a 10-system review was performed. Pertinent positives are noted in the HPI and on a separate scanned document in the chart.    PMH, PSH, FH and SH has documented in the EHR.      EXAM    CONSTITUTIONAL  General Appearance:  Normal, well developed, well nourished, no obvious distress  Ability to Communicate:  communicates appropriately.    HEAD AND FACE  Appearance and Symmetry:  Normal, no scalp or facial scarring or suspicious lesions.  Paranasal sinuses tenderness:  Normal, Paranasal sinuses non tender    EARS  Clinical speech reception threshold:  Normal, able to hear normal speech.  Auricle:  Normal, Auricles without scars, lesions, masses.  External auditory canal:  Normal, External auditory canal normal.  Tympanic membrane:  Normal, Tympanic membranes normal without swelling or erythema.  Tympanic membrane mobility:  Normal, Normal tympanic membrane mobility.    NOSE (speculum or scope)  Architecture:  Normal, Grossly normal external nasal architecture with no masses or lesions.  Mucosa:  Normal mucosa, No polyps or masses.  Septum:  Normal, Septum non-obstructing.  Turbinates:  Normal, No turbinate abnormalities    ORAL CAVITY AND OROPHARYNX  Lips:  Normal.  Dental and gingiva:  Normal, No obvious dental or gingival disease.  Mucosa:  Normal, Moist mucous membranes.  Tongue:  Normal, Tongue mobile with no mucosal abnormalities  Hard and soft palate:  Normal, Hard and soft palate without cleft or mucosal lesions.  Oral pharynx:  Normal, Posterior pharynx without lesions or remarkable  asymmetry.  Saliva:  Normal, Clear saliva.  Masses:  Normal, No palpable masses or pathologically enlarged lymph nodes.    NECK  Masses/lymph nodes:  Normal, No worrisome neck masses or lymph nodes.  Salivary glands:  Normal, Parotid and submandibular glands.  Trachea and larynx position:  Normal, Trachea and larynx midline.  Thyroid:  Normal, No thyroid abnormality.  Tenderness:  Normal, No cervical tenderness.  Suppleness:  Normal, Neck supple    NEUROLOGICAL  Speech pattern:  Normal, Proasaic    RESPIRATORY  Symmetry and Respiratory effort:  Normal, Symmetric chest movement and expansion with no increased intercostal retractions or use of accessory muscles.     HEARING TEST  Results of hearing test as documented in audiology notes which were reviewed.    IMPRESSION  Head and ear pressure with dizziness.     RECOMMENDATION  MRI head. This has been ordered.    Ignacio Elaine MD

## 2021-05-27 NOTE — PROGRESS NOTES
Assessment and Plan:    1. Routine general medical examination at a health care facility  Discussed consuming a healthy diet and exercising.  She believes she received the pneumonia vaccine.  Discussed seeing if her insurance will cover the shingles vaccine.    2. Prediabetes  We will check A1c and notify patient of results.  - Glycosylated Hemoglobin A1c    3. Mixed hyperlipidemia  She is not currently taking medication.  Will notify patient of results.  Discussed weight loss goals.  - Lipid Cascade    4. Vitamin D deficiency  She is not taking vitamin D supplementation.  Will notify patient of results.  - Vitamin D, Total (25-Hydroxy)    5. Sicca syndrome (H)  She continues to see rheumatology.  Will recheck Sed rate.    6. Malignant neoplasm of lower-outer quadrant of left female breast, unspecified estrogen receptor status (H)  She continues close follow-up with [place].    7. Neutropenia, unspecified type (H)  This is likely due to sicca syndrome.  We will recheck today.  - HM2(CBC w/o Differential)    8. Class 1 obesity due to excess calories with serious comorbidity and body mass index (BMI) of 33.0 to 33.9 in adult  The following high BMI interventions were performed this visit: encouragement to exercise and lifestyle education regarding diet    9. Cough  Chest x-ray shows no acute infiltrate.  Will have radiology review.  - XR Chest 2 Views; Future    10. Mild intermittent asthma with exacerbation  We will treat asthma flare with prednisone 20 mg twice daily for 5 days.  Will start Flovent daily and albuterol as needed due to chronic cough.  May consider referral to pulmonology if symptoms persist.  - predniSONE (DELTASONE) 20 MG tablet; Take 20 mg by mouth 2 (two) times a day for 5 days.  Dispense: 10 tablet; Refill: 0  - albuterol (PROAIR HFA;PROVENTIL HFA;VENTOLIN HFA) 90 mcg/actuation inhaler; Inhale 2 puffs every 6 (six) hours as needed for wheezing.  Dispense: 1 each; Refill: 0  - fluticasone (FLOVENT  HFA) 44 mcg/actuation inhaler; Inhale 2 puffs 2 (two) times a day.  Dispense: 1 Inhaler; Refill: 2    11. Neck pain  Patient complains of left-sided neck pain and left ear pressure.  Will refer to ENT for further evaluation.  - Ambulatory referral to ENT    12. Pressure sensation in left ear  Will refer to ENT for further evaluation.  - Ambulatory referral to ENT    13. Dizziness  We will check CMP, thyroid Cascade, hemogram.  Patient recently flew on an airplane.  She may have an effusion present in her left ear.  Will have patient see ENT.  Discussed concerning symptoms including headache, blurry vision, vomiting, numbness and tingling of her extremities, muscular weakness.  If these occur, suggest ER evaluation.  She is content with the plan.  - Comprehensive Metabolic Panel  - Thyroid Cascade  - HM2(CBC w/o Differential)      Subjective:     Roverto is a 54 y.o. female presenting to the clinic for a female physical.       LMP: five years ago  Hx of abnormal pap smear: none  Last pap smear: 2015 normal, negative HPV   Perform self-breast exams: yes   Vaginal discharge or irritation: none   Sexually active: yes,  for 25 years  Contraception: none   Concerns for STDs: none   Previous pregnancies:four pregnancies (one miscarriage and 3 vaginal deliveries)     Patient has history of left breast cancer which was treated at [facility].  She had a biopsy and lumpectomy June 2014.  Patient continues to obtain mammograms through [facility].  Patient sees Rheumatology for Sicca Syndrome and is not currently taking medication.   Patient has intermittent asthma. She has not been using inhalers.  She has had an intermittent nonproductive cough for the past year.  She complains of wheezing over the past month.  She denies rhinorrhea, postnasal drainage, headache, stomachache, nausea, vomiting, fever.  She has a history of vitamin D deficiency.    She is not currently taking supplementation.    Patient complains of pressure and  "abnormal sounds within her left ear over the past 3 months.  She has had difficulty laying down at night.  She also feels a pressure in the left side of her neck.  Patient states she travel to [facility] and [place] in February.  Over the past month, she has been experiencing intermittent dizziness that lasts for a few seconds.  This occurs with positional changes.  Patient states she had an instance where she \"shut off her brain \"and fell to her left side.  She denies headache, blurry vision, numbness and tingling in her extremities, muscular weakness.    She has prediabetes.  A1c is 6.2% on 2/16/18.  She has hyperlipidemia.  Last cholesterol check was on 2/16/18 with a total cholesterol 219, triglycerides 97, HDL 51, .      Review of systems:  I performed a 10 point review of systems.  All pertinent positives and negatives are noted in the HPI. All others are negative.     No Known Allergies    Current Outpatient Medications on File Prior to Visit   Medication Sig Dispense Refill     cholecalciferol, vitamin D3, 1,000 unit tablet Take 1,000 Units by mouth daily.       diclofenac sodium (VOLTAREN) 1 % Gel Apply approximately 1/2-1 gm over affected left hand and/or left elbow joints three times a day, as needed. 1 Tube 1     ergocalciferol (VITAMIN D2) 50,000 unit capsule Take 1 capsule (50,000 Units total) by mouth every 7 days. 12 capsule 0     No current facility-administered medications on file prior to visit.        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   Social Needs     Financial resource strain: Not on file     Food insecurity:     Worry: Not on file     Inability: Not on file     Transportation needs:     Medical: Not on file     Non-medical: Not on file   Tobacco Use     Smoking status: Never " "Smoker     Smokeless tobacco: Never Used   Substance and Sexual Activity     Alcohol use: No     Drug use: No     Sexual activity: No   Lifestyle     Physical activity:     Days per week: Not on file     Minutes per session: Not on file     Stress: Not on file   Relationships     Social connections:     Talks on phone: Not on file     Gets together: Not on file     Attends Judaism service: Not on file     Active member of club or organization: Not on file     Attends meetings of clubs or organizations: Not on file     Relationship status: Not on file     Intimate partner violence:     Fear of current or ex partner: Not on file     Emotionally abused: Not on file     Physically abused: Not on file     Forced sexual activity: Not on file   Other Topics Concern     Not on file   Social History Narrative    Originally from [facility].  3 grown children.  Emigrated from .       Past Medical History:   Diagnosis Date     Breast cancer (H) 2014    s/p resection and radiation     DCIS (ductal carcinoma in situ) of breast     ER-MT-     GERD (gastroesophageal reflux disease)      Hx of radiation therapy 2014     Vitamin D deficiency        Family History   Problem Relation Age of Onset     Throat cancer Father          \"I think age 82.\"     Hypertension Mother      BRCA 1/2 Neg Hx        Past Surgical History:   Procedure Laterality Date     BREAST BIOPSY Left 2014     BREAST LUMPECTOMY Left        Objective:     Vitals:    19 0830   BP: 128/80   Pulse: 68   SpO2: 98%       Patient is alert, no obvious distress.   Skin: Warm, dry.  No rashes or lesions. Skin turgor rapid return.   HEENT:  Eyes normal. Right ear is normal.  Small effusion is present in the left ear.  Nose patent, mucosa pink.  Oropharynx mucosa pink, no lesions or tonsil enlargement.   Neck:  Supple, without lymphadenopathy, bruits, JVD. Thyroid normal texture and size.    Lungs:  Expiratory wheezing heard throughout the lungs.   No " rhonchi, rales noted.  Respirations even and unlabored.   Heart:  Regular rate and rhythm.  No murmurs.   Breasts:  Normal.  No surrounding adenopathy.   Abdomen: Soft, nontender.  No organomegaly.  Bowel sounds normoactive.  No guarding or masses noted.   :  deferred  Musculoskeletal:  Full ROM of extremities.  Muscle strength equal +5/5.   Neurological:  Cranial nerves 2-12 intact.      I ordered and personally reviewed a chest x-ray showing no obvious infiltrate.  Will have radiology review.

## 2021-05-27 NOTE — PATIENT INSTRUCTIONS - HE
Summary of Your Rheumatology Visit    Next Appointment:  3 Months    Medications:        Referrals:    Physical Therapy    Pulmonary    Tests:        Injections:

## 2021-05-27 NOTE — PROGRESS NOTES
"Roverto Gonzalez who presents today with a chief complaint of  Follow-up      Joint Pains: Yes  Location: left shoulder/arm (\"however better than before\")  Onset: 1 month   Intensity: 0-5/10  AM Stiffness: 5 Minutes  Alleviating/Aggravating Factors:  Tolerating Meds:yes  Other:      ROS:  Patient denies having any chest pain, shortness of breath, cough, abdominal pain, nausea, vomiting, rashes, fevers, oral ulcers and recent infections.  Patient admits to having a good appetite      Problem List:  Patient Active Problem List   Diagnosis     DCIS (ductal carcinoma in situ)     Malignant neoplasm of lower-outer quadrant of female breast (H)     GERD (gastroesophageal reflux disease) - awareness of throat - see note of 6/10/15     Vitamin D deficiency     Hypovitaminosis D  (< 20 in )     Obesity (BMI 30.0-34.9)     Thyroid cyst     Helicobacter pylori (H. pylori)     Asthma     Dyspnea     Sicca syndrome (H)     Prediabetes     Mixed hyperlipidemia     Neutropenia, unspecified type (H)        PMH:   Past Medical History:   Diagnosis Date     Breast cancer (H)     s/p resection and radiation     DCIS (ductal carcinoma in situ) of breast     ER-NV-     GERD (gastroesophageal reflux disease)      Hx of radiation therapy 2014     Vitamin D deficiency        Surgical History:  Past Surgical History:   Procedure Laterality Date     BREAST BIOPSY Left 2014     BREAST LUMPECTOMY Left        Family History:  Family History   Problem Relation Age of Onset     Throat cancer Father          \"I think age 82.\"     Hypertension Mother      BRCA 1/2 Neg Hx        Social History:   reports that  has never smoked. she has never used smokeless tobacco. She reports that she does not drink alcohol or use drugs.    Allergies:  No Known Allergies     Current Medications:  Current Outpatient Medications   Medication Sig Dispense Refill     albuterol (PROAIR HFA;PROVENTIL HFA;VENTOLIN HFA) 90 mcg/actuation inhaler " "Inhale 2 puffs every 6 (six) hours as needed for wheezing. 1 each 0     ergocalciferol (ERGOCALCIFEROL) 50,000 unit capsule Take 1 capsule (50,000 Units total) by mouth 2 (two) times a week for 24 doses. 24 capsule 0     fluticasone (FLOVENT HFA) 44 mcg/actuation inhaler Inhale 2 puffs 2 (two) times a day. 1 Inhaler 2     No current facility-administered medications for this visit.            Physical Exam:  /70 (Patient Site: Right Arm, Patient Position: Sitting, Cuff Size: Adult Regular)   Pulse (!) 54   Ht 5' 4.25\" (1.632 m)   LMP 02/02/2014   BMI 33.82 kg/m    General: A & O x 3 in NAD  HEENT: EOMI, Non injected/non icteric sclera, no oral lesions noted Dermatology: Grossly no clear signs of dilated capillary loops.  CV: s1s2 with RRR, no rubs appreciated   Lungs: CTA B/L, no wheezing , rales or rhonci appreciated  GI: Soft, NT/ND, no rebound, no guarding noted  MS: Positive mild discomfort involving left shoulder on passive/active range of motion testing with mildly positive left emptying can test.  No focal tenderness over left bicipital tendon or left subacromial bursa regions.  No discomfort on passive range of motion testing of right shoulder.  Active C-spine range of motion testing did not reproduce any pains, no limitations noted.  Has some focal discomfort involving vertebral/paravertebral upper thoracic spine region.  Negative straight leg raising bilaterally.  5/5 strength involving proximal upper/lower extremities bilaterally.  Otherwise patient demonstrated good passive/active ROM over other joints with no warmth, erythema, tenderness or synovitis noted over these joints.    Summary/Assessment:    History that includes positive Jamila 1 antibody, pulmonary rhonchi, left lateral epicondylitis, osteoarthritis, myofascial pains.    Presents for follow-up visit and claims that left lateral epicondyle pain has improved.  However now complains of having left shoulder discomfort sometimes radiating " down left arm.  Denies having any numbness or tingling or associated neck pain.  Given physical exam findings left shoulder pains may be related to rotator cuff tendinopathy.    Also complains of having occasional mid/upper thoracic back pain.    Denies having any active skin lesions at this time, states improved.  Did not pursue referral to dermatology.    States shortness of breath and cough currently doing better however is on a 5-day 20 mg prednisone course.  Did not pursue recommendation/referral to pulmonary.    Has persistently elevated ESR/CRP levels of questionable etiology at this time.  Perhaps related to pulmonary symptoms.    Denies having Raynaud's-like symptoms.    No proximal upper/lower extremity weakness noted.  .    No clear signs of synovitis noted on exam today.    Please see below for management plan.      Pertinent rheumatology/past medical history (please refer to above for more detailed history):      Positive Jamila 1 antibody    Chronic cough/diffuse rhonchi/wheezing    Left lateral epicondylitis    Hx of left third digit discomfort     Left knee osteoarthritis    Myofascial pain/nonrestorative sleep    Dry mouth (negative Sjogren antibodies)     Generalized pruritus (without rash onset > 1 year)    Pruritic rash (6/2018)    Overweight    Vitamin D deficiency    Elevated ALT (mild)    Neutropenia (mild)    Elevated sed rate/CRP    Left shoulder pain    Mid/upper thoracic back pain          Rheumatology medications provided/suggested:    Tylenol, as needed  Voltaren gel, as needed      Pertinent medication from other providers or from otc (please refer to above for more detailed med list):    Aleve, as needed      Pertinent medications already tried:           Pertinent lab history:    Positive Jamila 1 antibody    Noted to have negative/unremarkable: Rheumatoid factor, CCP antibody, other ALEYDA related subsets.      Pertinent imaging/test history:    X-rays of left knee from July 2013 show some  signs of DJD.    Chest x-ray from June 2016 was unremarkable.    From May 2018:  x-rays of left elbow and left hand were unremarkable.    Other:        Plan:        If necessary, continue Voltaren gel as needed over left lateral epicondyle region and left third digit A1 pulley region.    If necessary, continue taking Tylenol 500-1000 mg as needed for arthralgias/myalgias.    If necessary, has left armband to be used with activities that reproduce left elbow pains.      Again referral placed for pulmonary given history of shortness of breath/cough and anti-Jamila 1 antibody/elevated ESR.  Emphasize importance the patient to pursue this referral.    Will refer to physical therapy for left shoulder pains and upper back pains.    Check labs today and again prior to next visit..    Follow-up in 3 months.      Procedure note:        Spent 40 minutes with greater than half of this time spent with the patient going over differential diagnosis, prognosis, treatment plan, medication side effects and  answering questions.      This note was transcribed using Dragon voice recognition software as a result unintentional grammatical errors or word substitutions may have occurred. Please contact our Rheumatology department if you need any clarification or if you have any related inquiries.    Jose Naqvi ....................  4/4/2019   8:39 AM

## 2021-05-27 NOTE — TELEPHONE ENCOUNTER
Phone call: Provided the results of the MRI scan of her head. No evidence of mass or tumor. No evidence of significant sinus infection. There was some mild mucosal thickening in the mastoid. Advised follow up as needed or if questions,

## 2021-05-28 ENCOUNTER — RECORDS - HEALTHEAST (OUTPATIENT)
Dept: ADMINISTRATIVE | Facility: CLINIC | Age: 56
End: 2021-05-28

## 2021-05-28 ASSESSMENT — ASTHMA QUESTIONNAIRES
ACT_TOTALSCORE: 21
ACT_TOTALSCORE: 25
ACT_TOTALSCORE: 23
ACT_TOTALSCORE: 21

## 2021-05-30 VITALS — HEIGHT: 65 IN | BODY MASS INDEX: 32.99 KG/M2 | WEIGHT: 198 LBS

## 2021-05-30 VITALS — HEIGHT: 65 IN | BODY MASS INDEX: 32.76 KG/M2 | WEIGHT: 196.6 LBS

## 2021-05-30 NOTE — TELEPHONE ENCOUNTER
----- Message from Anisha Ramirez CNP sent at 7/24/2019  6:24 PM CDT -----  Please notify the patient that her labs are normal except her Vitamin D is low.  I sent a prescription to the pharmacy for Vitamin D 76444 units twice weekly for 12 weeks.  I recommend a lab recheck then.  Once she completes the high dose, I recommend that she take 2000 units of Vitamin D3 daily which she can buy over-the-counter.  Thanks.

## 2021-05-30 NOTE — TELEPHONE ENCOUNTER
Triage   Lost item in her room, transferred patient to [facility] #[id number] and called clinic [facility] for clinic to follow up with her.     Sarah Lemon RN BSBA [facility] Triage/Med Refill 7/22/2019 3:11 PM

## 2021-05-30 NOTE — PROGRESS NOTES
"Roverto Gonzalez who presents today with a chief complaint of  Follow-up      Joint Pains: Yes  Location: left shoulder, left upper back  Onset: chronic  Intensity: mild to moderate  AM Stiffness: few Minutes  Alleviating/Aggravating Factors:Activity exacerbates pains.  Tolerating Meds: yes  Other:      ROS:  Patient denies having any chest pain, +shortness of breath, +cough, no: abdominal pain, nausea, vomiting, rashes, fevers, oral ulcers and recent infections.  Patient admits to having a good appetite      Problem List:  Patient Active Problem List   Diagnosis     DCIS (ductal carcinoma in situ)     Malignant neoplasm of lower-outer quadrant of female breast (H)     GERD (gastroesophageal reflux disease) - awareness of throat - see note of 6/10/15     Vitamin D deficiency     Hypovitaminosis D  (< 20 in )     Obesity (BMI 30.0-34.9)     Thyroid cyst     Helicobacter pylori (H. pylori)     Asthma     Dyspnea     Sicca syndrome (H)     Prediabetes     Mixed hyperlipidemia     Neutropenia, unspecified type (H)        PMH:   Past Medical History:   Diagnosis Date     Breast cancer (H)     s/p resection and radiation     DCIS (ductal carcinoma in situ) of breast     [facility]-     GERD (gastroesophageal reflux disease)      Hx of radiation therapy 2014     Vitamin D deficiency        Surgical History:  Past Surgical History:   Procedure Laterality Date     BREAST BIOPSY Left      BREAST LUMPECTOMY Left        Family History:  Family History   Problem Relation Age of Onset     Throat cancer Father          \"I think age 82.\"     Hypertension Mother      BRCA 1/2 Neg Hx        Social History:   reports that she has never smoked. She has never used smokeless tobacco. She reports that she does not drink alcohol or use drugs.    Allergies:  No Known Allergies     Current Medications:  Current Outpatient Medications   Medication Sig Dispense Refill     albuterol (PROAIR HFA;PROVENTIL HFA;VENTOLIN " "HFA) 90 mcg/actuation inhaler Inhale 2 puffs every 6 (six) hours as needed for wheezing. 1 each 0     ergocalciferol (ERGOCALCIFEROL) 50,000 unit capsule Take 1 capsule (50,000 Units total) by mouth 2 (two) times a week for 24 doses. 24 capsule 0     lisinopril (PRINIVIL,ZESTRIL) 10 MG tablet Take 1 tablet (10 mg total) by mouth daily. 30 tablet 0     predniSONE (DELTASONE) 20 MG tablet Take 20 mg by mouth 2 (two) times a day for 5 days. 10 tablet 0     triamcinolone (KENALOG) 0.1 % cream Apply to the affected area twice daily as needed.  No longer than 2 weeks 45 g 2     fluticasone propionate (FLOVENT HFA) 44 mcg/actuation inhaler Inhale 2 puffs 2 (two) times a day. 1 Inhaler 2     SF 5000 PLUS 1.1 % Crea BRUSH A PEA SIZED AMOUNT OF PASTE BID UNTIL GONE  0     No current facility-administered medications for this visit.            Physical Exam:  /74   Pulse 64   Ht 5' 4.5\" (1.638 m)   Wt 200 lb 1.6 oz (90.8 kg)   LMP 02/02/2014   BMI 33.82 kg/m    General: A & O x 3 in NAD  HEENT: EOMI, Non injected/non icteric sclera, no oral lesions noted  CV: s1s2 with RRR, no rubs appreciated   Lungs: Positive diffuse rhonchi, CTA B/L, no wheezing , rales or rhonci appreciated  GI: Soft, NT/ND, no rebound, no guarding noted  MS: Demonstrated 5/5 proximal/distal upper and lower extremity strength bilaterally.  Passive range of motion testing of left shoulder did not reproduce any pains.  Negative straight leg raising bilaterally.  Otherwise patient demonstrated good passive/active ROM over other joints with no warmth, erythema, tenderness or synovitis noted over these joints.        Summary/Assessment:    History that includes positive Jamila 1 antibody, pulmonary rhonchi, left lateral epicondylitis, osteoarthritis, myofascial pains.    States is still expensing occasional left shoulder and upper back pains.    Patient did not pursue referral to PT.    Has not tried taking Tylenol as suggested, states apprehensive about " potential liver toxicity.  Denies having any prior history of liver disease.  Denies alcohol beverage intake.    Adds that she has been experiencing some weakness involving distal forearms/hands.  On exam demonstrated good proximal/distal strength involving upper/lower extremities bilaterally.  Also noted to have good bilateral hand  strength.  Is interested in referral to OT.      Did not see pulmonologist yet, states has an appointment this coming Monday.  Still noted to have diffuse rhonchi, not using any inhalers.    Still noted to have mildly elevated ESR, lately normal CRP.    Muscle enzymes within normal limits.    No clear signs of synovitis noted exam today.    Please see below for management plan.    From prior note: However now complains of having left shoulder discomfort sometimes radiating down left arm.  Denies having any numbness or tingling or associated neck pain.  Given physical exam findings left shoulder pains may be related to rotator cuff tendinopathy.    Also complains of having occasional mid/upper thoracic back pain.    Denies having any active skin lesions at this time, states improved.  Did not pursue referral to dermatology.    Has persistently elevated ESR/CRP levels of questionable etiology at this time.  Perhaps related to pulmonary symptoms.      Pertinent rheumatology/past medical history (please refer to above for more detailed history):      Positive Jamila 1 antibody    Elevated sed rate/CRP (mild)    Chronic cough/diffuse rhonchi/wheezing    Left lateral epicondylitis    Hx of left third digit discomfort     Left knee osteoarthritis    Myofascial pain/nonrestorative sleep    Dry mouth (negative Sjogren antibodies)     Generalized pruritus (without rash onset > 1 year)    Pruritic rash (6/2018)    Overweight    Vitamin D deficiency    Elevated ALT (mild)    Neutropenia (mild)    Left shoulder pain    Mid/upper thoracic back pain          Rheumatology medications  provided/suggested:    Tylenol, as needed  Voltaren gel, as needed      Pertinent medication from other providers or from otc (please refer to above for more detailed med list):    Aleve, as needed      Pertinent medications already tried:           Pertinent lab history:    Positive Jamila 1 antibody    Noted to have negative/unremarkable: Rheumatoid factor, CCP antibody, other ALEYDA related subsets.      Pertinent imaging/test history:    X-rays of left knee from July 2013 show some signs of DJD.    Chest x-ray from June 2016 was unremarkable.    From May 2018:  x-rays of left elbow and left hand were unremarkable.    Other:        Plan:        Had lengthy discussion with the patient regarding Tylenol made aware that safe to take on a when necessary basis at 500-1000 mg daily-twice daily for arthralgias/myalgias.    If necessary, continue Voltaren gel as needed over left lateral epicondyle region and left third digit A1 pulley region.    If necessary, has left armband to be used with activities that reproduce left elbow pains.      Maintain upcoming appointment with pulmonary given history of shortness of breath/cough and anti-Jamila 1 antibody/elevated ESR.  Emphasize importance the patient to pursue this referral.    Recommend that she pursue referral to PT for chronic left shoulder pains and upper back pains.    Referral placed to OT for sensations of distal forearm/hand weakness.    Given positive anti-Jamila 1 antibody, which is a monitor for any signs and symptoms consistent with having myositis.    Check labs in 1 month and prior to next visit.  Has a standing order for muscle enzymes and ESR/CRP levels every 3 months..    Follow-up in 4 months, sooner as needed.      Procedure note:     Spent 40 minutes with greater than half of this time spent with the patient going over differential diagnosis, prognosis, treatment plan, medication side effects and  answering questions.    This note was transcribed using Dragon voice  recognition software as a result unintentional grammatical errors or word substitutions may have occurred. Please contact our Rheumatology department if you need any clarification or if you have any related inquiries.    Major side effect profile of medications suggested were discussed with the patient.      Jose Naqvi ....................  7/25/2019   11:12 AM

## 2021-05-30 NOTE — PATIENT INSTRUCTIONS - HE
Take the Breo once every day no matter how your breathing is feeling. Rinse your mouth out after each use.  Use the albuterol inhaler when you are short of breath and wheezing. The more you use it, the more you may feel your heart going faster. Ok to use up to every 4 hours.   We will do lung function testing to see if things have changed since 2016.     Please call Valery my Nurse if you have issues before follow up [phone]

## 2021-05-30 NOTE — PROGRESS NOTES
Assessment and Plan:     1. Benign essential hypertension  Patient has had elevated blood pressure readings at home.  I reviewed the blood pressure readings here in the clinic which have been normal.  Patient has been experiencing headaches which coincide with the elevated blood pressure readings.  She had an MRI of her brain on 4/1/2019 showing no acute infarct, mass, or hemorrhage.  Provided prescription for lisinopril 10 mg daily.  Patient is unsure if she is going to take this to see if it improves the headaches.  She would like to continue to monitor her blood pressure at home.  She will notify me if she does initiate this.  We will also check BMP and thyroid cascade.  - lisinopril (PRINIVIL,ZESTRIL) 10 MG tablet; Take 1 tablet (10 mg total) by mouth daily.  Dispense: 30 tablet; Refill: 0  - Electrocardiogram Perform - Clinic  - Basic Metabolic Panel  - Thyroid Cascade    2. Moderate persistent asthma with exacerbation  Provided prescription for prednisone 20 mg twice daily for 5 days.  Discussed importance of using her Flovent daily.  Will refer to pulmonology.  - predniSONE (DELTASONE) 20 MG tablet; Take 20 mg by mouth 2 (two) times a day for 5 days.  Dispense: 10 tablet; Refill: 0  - fluticasone propionate (FLOVENT HFA) 44 mcg/actuation inhaler; Inhale 2 puffs 2 (two) times a day.  Dispense: 1 Inhaler; Refill: 2  - Ambulatory referral to Pulmonology    3. Mixed hyperlipidemia  Discussed cholesterol labs which are mildly elevated.  Discussed taking over-the-counter fish oil or CoQ10.    4. Vitamin D deficiency  She is not currently taking vitamin D supplementation.  Will notify patient of results.  - Vitamin D, Total (25-Hydroxy)    5. Rash  Differentials include eczema, psoriasis, tinea.  Provided prescription for triamcinolone cream to use as needed.  She will follow-up if symptoms persist or worsen.  - triamcinolone (KENALOG) 0.1 % cream; Apply to the affected area twice daily as needed.  No longer than 2  weeks  Dispense: 45 g; Refill: 2    I spent 40 minutes with the patient with greater than 50% spent discussing symptoms, treatment options, and coordination of care.       Subjective:     Roverto is a 54 y.o. female presenting to the clinic for multiple concerns today.  Patient has multiple comorbidities including GERD, vitamin D deficiency, asthma, sicca syndrome, prediabetes, hyperlipidemia, history of breast cancer.  Patient is concerned of hypertension.  She has a history of intermittent posterior headaches for 4 months.  She describes the headache as an ache within her occipital region.  Headaches for last for a few days in a row and then resolve for a few days.  When her headache occurs, her blood pressure is elevated above 150 systolic.  She denies blurry vision, nausea, vomiting, numbness and tingling of her extremities, muscular weakness, facial drooping.  Patient is concerned of worsening asthma.  Patient was treated for an asthma exacerbation in March.  1 month ago, her symptoms returned.  She complains of wheezing.  She has shortness of breath with exertion.  She has a productive cough.  She denies rhinorrhea, postnasal drainage, stomachache, fever.  She has not been using a daily controlling inhaler.  She uses albuterol 2 to 3 days/week.  Patient has also had a rash present on her left hand since last June.  The rash is intermittent and pruritic.  It improves with use of steroid.  She believes she did see dermatology who suspects suspected eczema.    Review of Systems: A complete 14 point review of systems was obtained and is negative or as stated in the history of present illness.    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   Social Needs     Financial resource strain: Not on file     Food  insecurity:     Worry: Not on file     Inability: Not on file     Transportation needs:     Medical: Not on file     Non-medical: Not on file   Tobacco Use     Smoking status: Never Smoker     Smokeless tobacco: Never Used   Substance and Sexual Activity     Alcohol use: No     Drug use: No     Sexual activity: Never   Lifestyle     Physical activity:     Days per week: Not on file     Minutes per session: Not on file     Stress: Not on file   Relationships     Social connections:     Talks on phone: Not on file     Gets together: Not on file     Attends [employer] service: Not on file     Active member of club or organization: Not on file     Attends meetings of clubs or organizations: Not on file     Relationship status: Not on file     Intimate partner violence:     Fear of current or ex partner: Not on file     Emotionally abused: Not on file     Physically abused: Not on file     Forced sexual activity: Not on file   Other Topics Concern     Not on file   Social History Narrative    Originally from [facility] [place].  3 grown children.  Emigrated from 1996.       Active Ambulatory Problems     Diagnosis Date Noted     DCIS (ductal carcinoma in situ) 07/18/2014     Malignant neoplasm of lower-outer quadrant of female breast (H) 08/21/2014     GERD (gastroesophageal reflux disease) - awareness of throat - see note of 6/10/15      Vitamin D deficiency      Hypovitaminosis D  (< 20 in December, 2014) 06/10/2015     Obesity (BMI 30.0-34.9) 06/10/2015     Thyroid cyst 12/08/2015     Helicobacter pylori (H. pylori) 02/03/2016     Asthma 02/09/2016     Dyspnea 02/09/2016     Sicca syndrome (H) 01/15/2018     Prediabetes 02/22/2018     Mixed hyperlipidemia 02/22/2018     Neutropenia, unspecified type (H) 03/25/2019     Resolved Ambulatory Problems     Diagnosis Date Noted     Backache      Hyperproteinemia      Eczema      Wheezing (Symptom)      Aneurysm Of The Left Internal Carotid Artery      Vaginal Pap Smear Abnormal Atypical  "Squamous Cells Of Undetermined Significance      Midback Pain      Skin: A Rash      Past Medical History:   Diagnosis Date     Breast cancer (H)      DCIS (ductal carcinoma in situ) of breast      GERD (gastroesophageal reflux disease)      Hx of radiation therapy 2014     Vitamin D deficiency        Family History   Problem Relation Age of Onset     Throat cancer Father          \"I think age 82.\"     Hypertension Mother      BRCA 1/2 Neg Hx        Objective:     /74   Pulse (!) 54   Ht 5' 4.5\" (1.638 m)   Wt 198 lb 9.6 oz (90.1 kg)   LMP 2014   SpO2 94%   BMI 33.56 kg/m      Patient is alert, in no obvious distress.   Skin: Warm, dry.  She has a dry scaly rash noted on the dorsal aspect of the left hand.   HEENT:  Head normocephalic, atraumatic.  Eyes normal. Ears normal.  Nose patent, mucosa pink.  Oropharynx mucosa pink.  No lesions or tonsillar enlargement.   Neck: Supple, no lymphadenopathy.  No thyromegaly.  Lungs:  Inspiratory wheezing heard throughout the lungs. Respirations even and unlabored.  No rhonchi or rales noted.   Heart:  Regular rate and rhythm.  No murmurs, S3, S4, gallops, or rubs.      LABORATORY: I ordered and personally reviewed an EKG showing sinus bradycardia, junctional ST depression probably normal.                "

## 2021-05-30 NOTE — TELEPHONE ENCOUNTER
Patient Returning Call  Reason for call:  Results  Information relayed to patient:    ----- Message from Anisha Ramirez CNP sent at 7/24/2019  6:24 PM CDT -----  Please notify the patient that her labs are normal except her Vitamin D is low.  I sent a prescription to the pharmacy for Vitamin D 01889 units twice weekly for 12 weeks.  I recommend a lab recheck then.  Once she completes the high dose, I recommend that she take 2000 units of Vitamin D3 daily which she can buy over-the-counter.  Thanks.  Patient has additional questions:  No. Patient verbalized understanding and will call back to schedule her lab only appointment for 12 weeks from now.  If YES, what are your questions/concerns:  N/A  Okay to leave a detailed message?: No call back needed

## 2021-05-30 NOTE — PROGRESS NOTES
"Pulmonary Clinic Follow Up    Cc: follow up asthma    HPI: [id number] [place] woman with asthma presents for follow up. She was initially seen in 2016 for better asthma control. She was initiated on Symbicort at that time which she has since stopped. She had normal IgE, eosinophils. She is also followed by Rheumatology for elevated ESR/CRP and positive Jo1.    She has been having ongoing shortness of breath and rheumatology was concerned about potential pulmonary involvement of her autoimmune process. She had PFTs in 2016 that were normal, TLC 93% predicted, DLCO 99% predicted.     She reports her breathing has worsened since 2016 when I saw her. She reports the wheezing previously was episodic and now it never goes away.  Short of breath when walking hills or stairs. Smoke seems to be a trigger but not really identifying other things. Mostly short of breath with activity but describes \"noisey\" breathing. Did think the symbicort helped a little bit but things never went away.     ROS: 12-point review performed and notable for wheezing, shortness of breath, cough, snoring, palpitations, loss of balance, joint pains and weakness. The remainder reviewed and negative.       Current Outpatient Medications on File Prior to Visit   Medication Sig Dispense Refill     albuterol (PROAIR HFA;PROVENTIL HFA;VENTOLIN HFA) 90 mcg/actuation inhaler Inhale 2 puffs every 6 (six) hours as needed for wheezing. 1 each 0     ergocalciferol (ERGOCALCIFEROL) 50,000 unit capsule Take 1 capsule (50,000 Units total) by mouth 2 (two) times a week for 24 doses. 24 capsule 0     fluticasone propionate (FLOVENT HFA) 44 mcg/actuation inhaler Inhale 2 puffs 2 (two) times a day. 1 Inhaler 2     lisinopril (PRINIVIL,ZESTRIL) 10 MG tablet Take 1 tablet (10 mg total) by mouth daily. 30 tablet 0     SF 5000 PLUS 1.1 % Crea BRUSH A PEA SIZED AMOUNT OF PASTE BID UNTIL GONE  0     triamcinolone (KENALOG) 0.1 % cream Apply to the affected area twice daily as " needed.  No longer than 2 weeks 45 g 2     No current facility-administered medications on file prior to visit.      Vitals:    07/29/19 0810   BP: 114/64   Pulse: 60   Resp: 24   SpO2: 95%   RA  Gen: awake, alert  HEENT: clear oropharynx without lesions  Neck: trachea midline, no lymphadenopathy  C/V: RRR, S1 and S2. Prominent P2  Resp: diffuse expiratory wheezing  Ext: no edema or clubbing  Skin: no rashes or lesions visible  Neuro: awake, alert, grossly normal.    ASSESSMENT/PLAN:  Asthma--Moderate persistent at this point  -Discussed pathophysiology of asthma, explained that use of controller medication is key to keeping symptoms at bay. Initiate Breo (only once daily) and instruction provided by RN today in clinic.  -Repeat PFTs for comparison and to see if DLCO/TLC is also affected  -Consider imaging based on PFTs  -RTC 6 weeks to discuss    Ericka March MD  Electronically signed on 7/29/2019 8:40 AM

## 2021-05-31 ENCOUNTER — RECORDS - HEALTHEAST (OUTPATIENT)
Dept: ADMINISTRATIVE | Facility: CLINIC | Age: 56
End: 2021-05-31

## 2021-05-31 VITALS — HEIGHT: 65 IN | BODY MASS INDEX: 32.9 KG/M2 | WEIGHT: 197.5 LBS

## 2021-05-31 VITALS — HEIGHT: 65 IN | BODY MASS INDEX: 33.26 KG/M2 | WEIGHT: 199.6 LBS

## 2021-05-31 NOTE — PROGRESS NOTES
[facility] Certification Request    September 3, 2019      Patient: Roverto Gonzalez  MR Number: [id number]  YOB: 1965  Date of Visit: 9/3/2019      Dear Dr. Naqvi:    Thank you for this referral.   We are seeing Roverto Gonzalez for Physical Therapy of Chronic left shoulder pain and upper back pain.    Medicare and/or Medicaid requires physician review and approval of the treatment plan. Please review the plan of care and verify that you agree with the therapy plan of care by co-signing this note.      Plan of Care  Authorization / Certification Start Date: 09/03/19  Authorization / Certification End Date: 12/02/19  Communication with: Referral Source  Patient Related Instruction: Nature of Condition;Treatment plan and rationale;Self Care instruction;Basis of treatment;Body mechanics;Posture;Next steps  Times per Week: 1  Number of Weeks: 6  Number of Visits: 6  Precautions / Restrictions : history of breast cancer  Therapeutic Exercise: ROM;Stretching;Strengthening  Neuromuscular Reeducation: kinesio tape;posture  Manual Therapy: soft tissue mobilization;joint mobilization  Modalities: electrical stimulation;cold pack      Goals:  Pt. will demonstrate/verbalize independence in self-management of condition in : 12 weeks  Pt. will be independent with home exercise program in : 12 weeks  Pt. will report decreased intensity, frequency of : Pain;in 12 weeks;Comment  Comment:: of left shoulder and upper back when performing household cleaning  Patient will return to: other activity;in 12 weeks;Comment;exercise  other activity: walking outside and light household cleaning including vacuum    Patient will decrease : YESENIA score;Quick Dash score;by _ points;in 12 weeks  by ___ points: 5        If you have any questions or concerns, please don't hesitate to call.    Sincerely,      Bernadine Shanks, PT        Physician recommendation:     ___ Follow therapist's recommendation        ___ Modify  therapy      *Physician co-signature indicates they certify the need for these services furnished within this plan and while under their care.    [facility]   Cervical Thoracic Initial Evaluation    Patient Name: Roverto Gonzalez  Date of evaluation: 9/3/2019  Referral Diagnosis: Chronic left shoulder pain and upper back pain  Referring provider: Jose Naqvi*  Visit Diagnosis:     ICD-10-CM    1. Chronic upper back pain M54.9     G89.29    2. Chronic left shoulder pain M25.512     G89.29    3. Muscle weakness of left upper extremity M62.81    4. Decreased ROM of left shoulder M25.612        Assessment:      Pt. is appropriate for skilled PT intervention as outlined in the Plan of Care (POC).  Pt. is a good candidate for skilled PT services to improve pain levels and function.    Goals:  Pt. will demonstrate/verbalize independence in self-management of condition in : 12 weeks  Pt. will be independent with home exercise program in : 12 weeks  Pt. will report decreased intensity, frequency of : Pain;in 12 weeks;Comment  Comment:: of left shoulder and upper back when performing household cleaning  Patient will return to: other activity;in 12 weeks;Comment;exercise  other activity: walking outside and light household cleaning including vacuum    Patient will decrease : YESENIA score;Quick Dash score;by _ points;in 12 weeks  by ___ points: 5      Patient's expectations/goals are realistic.    Barriers to Learning or Achieving Goals:  Chronicity of the problem.  Co-morbidities or other medical factors.  history of breast cancer       Plan / Patient Instructions:        Plan of Care:   Authorization / Certification Start Date: 09/03/19  Authorization / Certification End Date: 12/02/19  Communication with: Referral Source  Patient Related Instruction: Nature of Condition;Treatment plan and rationale;Self Care instruction;Basis of treatment;Body mechanics;Posture;Next steps  Times per Week: 1  Number of  Weeks: 6  Number of Visits: 6  Precautions / Restrictions : history of breast cancer  Therapeutic Exercise: ROM;Stretching;Strengthening  Neuromuscular Reeducation: kinesio tape;posture  Manual Therapy: soft tissue mobilization;joint mobilization  Modalities: electrical stimulation;cold pack      Plan for next visit: review doorway stretch, wand stretches for flexion and ER of left shoulder,   MT to left shoulder and left lower cervical paraspinals.    Discuss icing and posture compliance.  Discuss activity level at home and daily walking compliance  Add theraband for rowing and pulldowns    Will see patient 1-2x/week until she leaves for vacation this month.  Then likely resume upon return if further PT needed.     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 female who presents to therapy today with complaints of upper back, neck and left arm pain started 1 year ago. Date of onset/duration of symptoms is 1 year ago for arm pain, and chronic for upper back pain. She also has complaints of left hand numbness and discomfort on ulnar side of hand.  Onset was gradual. Symptoms are getting worse. She reports chronic and constant  history of similar symptoms. She describes their previous level of function as limited with using left arm a lot for ADLS.  She has difficulty with doing ADLs that require grasping, reaching overhead or lifting.    Pain Rating:3  Pain rating at best: 1  Pain rating at worst: 3  Pain description: pain, tingling and into left hand/figners    Functional limitations are described as occurring with:   reaching behind back and donning bra and washing back    Patient reports benefit from:  chiropractic care and massage          Objective:      Note: Items left blank indicates the item was not performed or not indicated at the time of the evaluation.    Patient Outcome Measures :    Modified Oswestry Low Back Pain Disablity Questionnaire  in %: 20     Scores range from 0-100%, where a score of 0% represents minimal pain and maximal function. The minimal clinically important difference is a score reduction of 12%.  No data recorded   Scores range from 0-100, where a score of 0 represents minimal pain and maximal function. The minimal clinically important difference is a negative chagne of 16 points.     Cervical Thoracic Examination  1. Chronic upper back pain     2. Chronic left shoulder pain     3. Muscle weakness of left upper extremity     4. Decreased ROM of left shoulder       Involved side: Left and Bilateral  Posture Observation:      General sitting posture is  poor.    Cervical ROM:    Date: 9/3/19     *Indicate scale AROM AROM AROM   Cervical Flexion WNL     Cervical Extension WNL      Right Left Right Left Right Left   Cervical Sidebending         Cervical Rotation WNL Min loss with pain       Cervical Protraction WNL     Cervical Retraction WNL     Thoracic Flexion WNL     Thoracic Extension WNL     Thoracic Sidebending         Thoracic Rotation WNL Min loss with pain at left side       Left shoulder flexion 145 degrees with erp  Left shoulder abduction 142 degrees with erp  Left shoulder behind back min loss with erp  Left shoulder er    Resisted isometrics painful with ER/IR and mild with abduction    Strength     Date: 9/3/19     Cervical Myotomes/5 Right Left Right Left Right Left   Cervical Flexion (C1-2)         Cervical Sidebending (C3)         Shoulder Elevation (C4)  4       Shoulder Abduction (C5)  3       Elbow Flexion (C6)  4       Elbow Extension (C7)  4       Wrist Flexion (C7)  4       Wrist Extension (C6)  4       Thumb abduction (C8)         Finger Abduction (T1)           Sensation   intact      Reflex  "Testing  Cervical Dermatomes Right Left UE Reflexes Right Left   Back of the Head (C2)   Biceps (C5-6)     Supraclavicular Fossa (C3)   Brachioradialis (C5-6)     AC Joint (C4)   Triceps (C7-8)     Lateral Biceps (C5)   Heath s test     Palmar Thumb (C6)   LE Reflexes     Palmar 3rd Finger (C7)   Patellar (L3-4)     Palmar 5th Finger (C8)   Achilles (S1-2)     Ulnar Forearm (T1)   Babinski Response         Flexibility: decreased flexibility throughout bilateral upper extremities    Palpation: very tender to palpation of left lower cervical paraspinals and left posterior shoulder     Passive Mobility-Joint Integrity: Not tested.    Cervical Special Tests     Cervical Special Tests Right Left UE Nerve Mobility Right Left   Cervical compression  + Median nerve     Cervical distraction   Ulnar nerve     Spurling s test   Radial nerve     Shoulder abduction sign   Thoracic outlet     Deep neck flexor endurance test   Ivana     Upper cervical rotation   Adson s     Sharper-David   Cervical rotation lateral flexion     Alar ligament test   Other:     Other:   Other:       UE Screen: see above      Exercises:  Exercise #1: posture education  Comment #1: doorway shoulder stretch bialteral 10 seconds 2x  Exercise #2: supine wand shoulder flexion stretch 5\" 5x  Comment #2: supine or seated shoulder ER stretch 5\" tx      Treatment Today     TREATMENT MINUTES COMMENTS   Evaluation 30 Left shoulder, thoracic spine   Self-care/ Home management 8 Discussed home icing, posture education, activity levels at home and to increase with walking daily x 10', exercises   Manual therapy 8 MT in supine to left lower cervical paraspinals x 4' and to left posterior shoulder in supine x 4' with 2' set up 90/90  Difficulty with this as she is wearing headdress/scarf which interferes somewhat   Neuromuscular Re-education     Therapeutic Activity     Therapeutic Exercises 10 See flow sheet and above for ex.   Gait training   "   Modality__________________                Total 56    Blank areas are intentional and mean the treatment did not include these items.     PT Evaluation Code: (Please list factors)  Patient History/Comorbidities:  Breast cancer  DCIS (ductal carcinoma in situ)  Malignant neoplasm of lower-outer quadrant of female breast (H)    GERD (gastroesophageal reflux disease) - awareness of throat - see note of 6/10/15   Vitamin D deficiency  Tiny thyroid cysts noted on US 12/15.               Helicobacter pylori (H. pylori)  Asthma  Dyspnea  Sicca syndrome (H)  Prediabetes  Mixed hyperlipidemia  Neutropenia, unspecified type (H)     Examination: decreased rom of left shoulder and weakness left upper extremity.  Painful areas of left cervical spine and into left shoulder/arm  Clinical Presentation: stable  Clinical Decision Making: low    Patient History/  Comorbidities Examination  (body structures and functions, activity limitations, and/or participation restrictions) Clinical Presentation Clinical Decision Making (Complexity)   No documented Comorbidities or personal factors 1-2 Elements Stable and/or uncomplicated Low   1-2 documented comorbidities or personal factor 3 Elements Evolving clinical presentation with changing characteristics Moderate   3-4 documented comorbidities or personal factors 4 or more Unstable and unpredictable High                Bernadine Shanks, PT  9/3/2019  10:26 AM

## 2021-05-31 NOTE — PATIENT INSTRUCTIONS - HE
Work on exercises 2x/day  Ice pack 1x/day over sore shoulder and neck 10 minutes  Good posture while sitting and standing  Activity level at home:  Up every hour (sitting 1 hour, get up and move)  Try to walk daily 10 minutes outside

## 2021-05-31 NOTE — PROGRESS NOTES
[facility] Discharge Summary  Patient Name: Roverto Gonzalez  Date: 11/7/2019  Referral Diagnosis: weakness of both arms  Referring provider: Anisha Ramirez CNP  Visit Diagnosis:   1. Weakness of both hands     2. Pain in finger of left hand     3. Decreased activities of daily living (ADL)         Goal status: Unable to assess as patient did not return.    Patient was seen for 1 visit. The patient discontinued therapy, did not return.    The patient will need a new referral to resume.    Thank you for your referral.  Justine Lewis  11/7/2019  11:47 AM        [facility] Certification Request    September 3, 2019      Patient: Roverto Gonzalez  MR Number: [id number]  YOB: 1965  Date of Visit: 9/3/2019      Dear Dr. Jose Naqvi:    Thank you for this referral.   We are seeing Roverto Gonzalez in Occupational Therapy for bilateral hand weakness.    Medicare and/or Medicaid requires physician review and approval of the treatment plan. Please review the plan of care and verify that you agree with the therapy plan of care by co-signing this note.      Plan of Care  Authorization / Certification Start Date: 09/03/19  Authorization / Certification End Date: 12/02/19  Authorization / Certification Number of Visits: 12  Communication with: Referral Source  Patient Related Instruction: Nature of Condition;Treatment plan and rationale;Basis of treatment;Expected outcome  Times per Week: 1  Number of Weeks: 12  Number of Visits: 12  Therapeutic Exercise: Strengthening  Neuromuscular Reeducation: kinesio tape  Manual Therapy: soft tissue mobilization  Functional Training (ADL's): ergonomics;compensatory training;ADL's;self care  Orthotic Fitting: OTC;custom      Goals:  Patient Will Demonstrate / Verbalize independence in self-management of condition in: 2 weeks  Patient will be independent with home exercise program in: 2 weeks  Patient will be able to: lift;carry;reach;for  housework;for grocery shopping;with no pain;in 12 weeks  Patient will be able to  & pinch: for dressing;for hygiene;for grooming;for driving;with no pain;in 12 weeks  Patient will improve hand/finger coordination for: typing;fasteners;meal prep;with no pain;in 12 weeks        If you have any questions or concerns, please don't hesitate to call.    Sincerely,      Justine Lewis, OT        Physician recommendation:                                ___ Follow therapist's recommendation                                                                                                    ___ Modify therapy                                                                                                      Physician Signature:_____________________                                                                                                                                        Date:___________________________      *Physician co-signature indicates they certify the need for these services furnished within this plan and while under their care.      [facility]   Upper Extremity Initial Evaluation    Patient Name: Roverto Gonzalez  Date of evaluation: 9/3/2019  Referral Diagnosis: bilateral hand and arm weakness  Referring provider: Anisha Ramirez CNP  Visit Diagnosis:     ICD-10-CM    1. Weakness of both hands R29.898    2. Pain in finger of left hand M79.645    3. Decreased activities of daily living (ADL) R68.89        Assessment:      Pt. is appropriate for skilled OT intervention as outlined in the Plan of Care (POC).    Goals:  Patient Will Demonstrate / Verbalize independence in self-management of condition in: 2 weeks  Patient will be independent with home exercise program in: 2 weeks  Patient will be able to: lift;carry;reach;for housework;for grocery shopping;with no pain;in 12 weeks  Patient will be able to  & pinch: for dressing;for hygiene;for grooming;for driving;with no pain;in 12  weeks  Patient will improve hand/finger coordination for: typing;fasteners;meal prep;with no pain;in 12 weeks      Patient's expectations/goals are realistic.    Barriers to Learning or Achieving Goals:  No Barriers.       Plan / Patient Instructions:        Plan of Care:   Authorization / Certification Start Date: 09/03/19  Authorization / Certification End Date: 12/02/19  Authorization / Certification Number of Visits: 12  Communication with: Referral Source  Patient Related Instruction: Nature of Condition;Treatment plan and rationale;Basis of treatment;Expected outcome  Times per Week: 1  Number of Weeks: 12  Number of Visits: 12  Therapeutic Exercise: Strengthening  Neuromuscular Reeducation: kinesio tape  Manual Therapy: soft tissue mobilization  Functional Training (ADL's): ergonomics;compensatory training;ADL's;self care  Orthotic Fitting: OTC;custom         Subjective:        Social information:   Occupation:    Work Status:Working part time     History of Present Illness:    Roverto is a 54 y.o. female who presents to therapy today with complaints of bilateral hand weakness and left hand pain and tingling in 4th and 5th fingers. Date of onset/duration of symptoms is July 2018 with increased severity in July 2019. Onset was gradual. Symptoms are getting worse. She reports no history of similar symptoms. She describes their previous level of function as not limited. Functional limitations are described as occurring with gripping, pinching, lifting, carrying for ADL's and IADL's.     Pain rating at rest: 2  Pain rating with activity: 3         Objective:      Note: Items left blank indicates the item was not performed or not indicated at the time of the evaluation.    Patient Outcome Measures :    No data recorded    Upper Extremity Examination:  1. Weakness of both hands     2. Pain in finger of left hand     3. Decreased activities of daily living (ADL)       Precautions/Restrictions:  None  Involved side: Bilateral  Atrophy:  Absent  Color: Normal  Temperature: Normal  Edema: Absent  Palpation: left hand  Scar: NA  Guarded extremity: Normal.  Sensory: tingling in left 4th and 5th fingers      Coordination  Right   Left     NT WNL Impaired NT WNL Impaired   Gross Motor  X   X    Fine Motor  X   X    9 hole peg X   X       Hand AROM  Right   Left     NT WNL Impaired NT WNL Impaired   Full Fist  X   X    Flat Fist  X   X    Claw Fist  X   X      ROM/STRENGTH RIGHT LEFT   Note: * indicates pain AROM AROM   Shoulder Flexion - 180? WNL WNL   Shoulder Ext - 60?  WNL WNL   Shoulder Abd - 180? WNL WNL   Elbow Flexion - 150? WNL WNL   Elbow Extension - 0?    WNL WNL   Forearm Supination - 80? WNL WNL   Forearm Pronation - 80? WNL WNL   Wrist Flexion - 65-80?  WNL WNL   Wrist Extension - 65-80? WNL WNL   Ulnar Deviation - 20-35?     Radial Deviation - 10-20?      Strength 40# 23#   3 Point Pinch 12# 7#   Lateral Pinch 13# 10#     May benefit from PT evaluation: Already receiving PT    U/E orthosis currently:   No    Plan for next visit: to be determined    Treatment Today:  Patient instructed on ulnar nerve glides, and use of wrist brace at night. Applied and instructed on kinesiotape from medial hand to elbow. Instructed on proper ergonomics and activity modification.  TREATMENT MINUTES COMMENTS   Evaluation 22    Self-care/ Home management 8    Manual therapy     Neuromuscular Re-education 15    Orthotic fitting     Therapeutic Exercises     Iontophoresis           Total 45    Blank areas are intentional and mean the treatment did not include these items.     GOALS AND PLAN OF CARE WERE ESTABLISHED IN COOPERATION WITH THE PATIENT    OT Evaluation Code: (Please list factors)   Comorbidities:   Patient Active Problem List   Diagnosis     DCIS (ductal carcinoma in situ)     Malignant neoplasm of lower-outer quadrant of female breast (H)     GERD (gastroesophageal reflux disease) - awareness of throat -  see note of 6/10/15     Vitamin D deficiency     Hypovitaminosis D  (< 20 in December, 2014)     Obesity (BMI 30.0-34.9)     Thyroid cyst     Helicobacter pylori (H. pylori)     Asthma     Dyspnea     Sicca syndrome (H)     Prediabetes     Mixed hyperlipidemia     Neutropenia, unspecified type (H)       Profile/History Review: Brief    Need for eval modification: No    # Treatment options: Limited    Clinical Decision Making:  Low      Occupational Profile/ Medical and Therapy History and Comorbidities Occupational Performance Clinical Decision Making   (Complexity)   brief history with review of medical/therapy records related to the presenting problem.  No comorbidities 1-3 Performance deficits that result in activity limitations and/or participation restrictions.    No Assessment Modification  Low complexity, which includes  problem-focused assessments, and consideration of a limited number of treatment options.      expanded review of medical/therapy records and additional review of physical, cognitive and psychosocial history.    May have comorbidities 3-5 Performance deficits that result in activity limitations and/or participation restrictions.    Minimal to moderate modification of assessment Moderate complexity, which includes analysis of data from detailed assessments, and consideration of several treatment options.         Review of medical/therapy records and extensive additional review of physical, cognitive and psychosocial history.  Comorbidities affect occupational performance 5 or more Performance deficits that result in activity limitations and/or participation restrictions.    Significant modification of assessment High complexity, analysis of  Occupational profile and data,  Comprehensive assessments, multiple treatment options.            Justine Lewis  9/3/2019  11:05 AM

## 2021-06-01 VITALS — HEIGHT: 64 IN | WEIGHT: 197 LBS | BODY MASS INDEX: 33.63 KG/M2

## 2021-06-01 VITALS — WEIGHT: 198.7 LBS | BODY MASS INDEX: 33.92 KG/M2 | HEIGHT: 64 IN

## 2021-06-01 VITALS — WEIGHT: 197 LBS | HEIGHT: 64 IN | BODY MASS INDEX: 33.63 KG/M2

## 2021-06-01 NOTE — PROGRESS NOTES
[facility] Daily Progress     Patient Name: Roverto Gonzalez  Date: 9/10/2019  Visit #: 3/6  PTA visit #:    Referral Diagnosis: Chronic left shoulder pain and upper back pain  Referring provider: Anisha Ramirez CNP  Visit Diagnosis:     ICD-10-CM    1. Chronic upper back pain M54.9     G89.29    2. Chronic left shoulder pain M25.512     G89.29    3. Muscle weakness of left upper extremity M62.81    4. Decreased ROM of left shoulder M25.612          Assessment:     HEP/POC compliance is  poor this week-having family medical crisis and not able to do exercises.  Patient demonstrates understanding/independence with home program.  Response to Intervention has good pain relief with MT to left cervical paraspinals and after performing exercises   Patient is benefitting from skilled physical therapy and is making steady progress toward functional goals.  Patient is appropriate to continue with skilled physical therapy intervention, as indicated by initial plan of care.   Likely cervical derangement #5 left    Goal Status:  Pt. will demonstrate/verbalize independence in self-management of condition in : Progressing toward  Pt. will be independent with home exercise program in : Progressing toward  Pt. will report decreased intensity, frequency of : Progressing toward  Comment:: of left shoulder and upper back when performing household cleaning  Patient will return to: Progressing toward  other activity: walking outside and light household cleaning including vacuum    Patient will decrease : YESENIA score;Quick Dash score;by _ points;in 12 weeks  by ___ points: 5      Plan / Patient Education:     Continue with initial plan of care.  Progress with home program as tolerated.  Family crisis may keep patient in [place] next week vs leaving for [place]   Review theraband rowing and pulldowns, free weight with 2# for overhead pressup, wrist flex/extension, minisquats, and sidelying ER and standing abduction with free  "weights  Continue MT to left neck.  Assess use of KT for left neck and left arm    Subjective:     Pain Ratin about the same  No exercises since last visit due to family crisis    Pain at left mid cervical paraspinals and left lateral forearm/ulnar surface  Has not seen OT in past week.  Family meeting discussed cousin in hospice-stressful and may change travel plans- may or may not leave this Friday      Objective:   Posture fair-wearing head garment so difficult to fully assess, but does demonstrate forward head and shoulders  Good demonstration of exercises  Added ER and abduction with free weights  Tender to palpation of left cervical paraspinals laterally and posterior left shoulder    Exercises:  Exercise #1: posture education and UBE x2'  Comment #1: doorway shoulder stretch bialteral 10 seconds 2x  Exercise #2: supine wand shoulder flexion stretch 5\" 5x  Comment #2: supine or seated shoulder ER stretch 5\" tx  Exercise #3: yellow theraband for rowing and pulldowns 15x  (was 10x)  Comment #3: wrist flexion and extension with 2#  10x each  Exercise #4: overhead press with 2#  10x  Comment #4: mini squats 10x  Exercise #5: free weight for shoulder abduction 10x with 1# and sideling shoulder ER with 6 ounce 10x and bicep curls with 2# bilateral alternating        Treatment Today     TREATMENT MINUTES COMMENTS   Evaluation     Self-care/ Home management     Manual therapy 10 Supine 90/90 for MT to left cervical paraspinals next to C4-6 x 8' with 2' set up   Neuromuscular Re-education 8 KTape for left forearm ulnar surface from elbow to mid 5th lateral finger - 1 strip  KT to left neck 1 strip longitudinal over C4   Therapeutic Activity     Therapeutic Exercises 10 See flow sheet and ex above.  Added 2 new shoulder strengthening with free weights   Gait training     Modality__________________                Total 28    Blank areas are intentional and mean the treatment did not include these items.       Bernadine " JOHNY Shanks, PT  9/10/2019

## 2021-06-01 NOTE — PROGRESS NOTES
Pulmonary Clinic Follow Up    Cc: follow up asthma    HPI: 51yoF [place] woman with asthma presents for follow up. She was initially seen in 2016 for better asthma control. She was initiated on Symbicort at that time which she has since stopped. She had normal IgE, eosinophils. She is also followed by Rheumatology for elevated ESR/CRP and positive Jo1.    She has been having ongoing shortness of breath and rheumatology was concerned about potential pulmonary involvement of her autoimmune process. She returns today for results of PFTs that reveal stellar FEV1 and FVC but significantly decreased TLC 93-->76% despite no change in her weight.     She does think her breathing has improved since last visit but is using symbicort in evening and flovent in the morning. Does not have albuterol     ROS: 12-point review performed and notable for wheezing, shortness of breath, cough, snoring, palpitations, loss of balance, joint pains and weakness. The remainder reviewed and negative.       Current Outpatient Medications on File Prior to Visit   Medication Sig Dispense Refill     albuterol (PROAIR HFA;PROVENTIL HFA;VENTOLIN HFA) 90 mcg/actuation inhaler Inhale 2 puffs every 6 (six) hours as needed for wheezing. 1 each 0     budesonide-formoterol (SYMBICORT) 160-4.5 mcg/actuation inhaler Inhale 2 puffs 2 (two) times a day. 1 Inhaler 11     ergocalciferol (ERGOCALCIFEROL) 50,000 unit capsule Take 1 capsule (50,000 Units total) by mouth 2 (two) times a week for 24 doses. 24 capsule 0     fluticasone propionate (FLOVENT HFA) 44 mcg/actuation inhaler Inhale 2 puffs 2 (two) times a day. 1 Inhaler 2     lisinopril (PRINIVIL,ZESTRIL) 10 MG tablet Take 1 tablet (10 mg total) by mouth daily. 30 tablet 0     SF 5000 PLUS 1.1 % Crea BRUSH A PEA SIZED AMOUNT OF PASTE BID UNTIL GONE  0     triamcinolone (KENALOG) 0.1 % cream Apply to the affected area twice daily as needed.  No longer than 2 weeks 45 g 2     No current facility-administered  medications on file prior to visit.      Vitals:    09/10/19 0821   BP: 110/66   Pulse: (!) 56   Resp: 20   SpO2: 96%   RA  Gen: awake, alert  HEENT: clear oropharynx without lesions  Neck: trachea midline, no lymphadenopathy  C/V: RRR, S1 and S2. Prominent P2  Resp: wheezing bilterally but improved from previous exam.   Ext: no edema or clubbing  Skin: no rashes or lesions visible  Neuro: awake, alert, grossly normal.    ASSESSMENT/PLAN:  Asthma--Moderate persistent at this point  REstrictive lung disease based on PFTs  -Explained that symbicort two times a day is best and Discontinue flovent  -Rescue inhaler prescribed  -CT chest to assess for ILD related to her autoimmune process--will call with results.   -RTC 6 months  -Will need flu shot in October    Ericka March MD  Electronically signed on 9/10/2019 8:40 AM

## 2021-06-01 NOTE — TELEPHONE ENCOUNTER
Refill Approved    Rx renewed per Medication Renewal Policy. Medication was last renewed on 7/22/19.    Sarah Lemon, [facility] Triage/Med Refill 9/16/2019     Requested Prescriptions   Pending Prescriptions Disp Refills     lisinopril (PRINIVIL,ZESTRIL) 10 MG tablet [Pharmacy Med Name: LISINOPRIL 10MG TABLETS] 30 tablet 0     Sig: TAKE 1 TABLET(10 MG) BY MOUTH DAILY       [facility] Refill Protocol Passed - 9/16/2019  8:35 AM        Passed - PCP or prescribing provider visit in past 12 months       Last office visit with prescriber/PCP: 7/22/2019 Anisha Ramirez CNP OR same dept: 7/22/2019 Anisha Ramirez CNP OR same specialty: 7/22/2019 Anisha Ramirez CNP  Last physical: 3/25/2019 Last MTM visit: Visit date not found   Next visit within 3 mo: Visit date not found  Next physical within 3 mo: Visit date not found  Prescriber OR PCP: Anisha Ramirez CNP  Last diagnosis associated with med order: 1. Benign essential hypertension  - lisinopril (PRINIVIL,ZESTRIL) 10 MG tablet [Pharmacy Med Name: LISINOPRIL 10MG TABLETS]; TAKE 1 TABLET(10 MG) BY MOUTH DAILY  Dispense: 30 tablet; Refill: 0    If protocol passes may refill for 12 months if within 3 months of last provider visit (or a total of 15 months).             Passed - Serum Potassium in past 12 months     Lab Results   Component Value Date    Potassium 4.3 07/22/2019             Passed - Blood pressure filed in past 12 months     BP Readings from Last 1 Encounters:   09/10/19 110/66             Passed - Serum Creatinine in past 12 months     Creatinine   Date Value Ref Range Status   07/22/2019 0.74 0.60 - 1.10 mg/dL Final

## 2021-06-01 NOTE — PROGRESS NOTES
[facility] Daily Progress     Patient Name: Roverto Gonzalez  Date: 9/6/2019  Visit #: 2/6  PTA visit #:    Referral Diagnosis: Chronic left shoulder pain and upper back pain  Referring provider: Anisha Ramirez CNP  Visit Diagnosis:     ICD-10-CM    1. Chronic upper back pain M54.9     G89.29    2. Chronic left shoulder pain M25.512     G89.29    3. Muscle weakness of left upper extremity M62.81    4. Decreased ROM of left shoulder M25.612          Assessment:     HEP/POC compliance is  fair .  Patient demonstrates understanding/independence with home program.  Response to Intervention has good pain relief with MT to left cervical paraspinals and after performing exercises today  Patient is benefitting from skilled physical therapy and is making steady progress toward functional goals.  Patient is appropriate to continue with skilled physical therapy intervention, as indicated by initial plan of care.   Likely cervical derangement #5 left    Goal Status:  Pt. will demonstrate/verbalize independence in self-management of condition in : 12 weeks  Pt. will be independent with home exercise program in : 12 weeks  Pt. will report decreased intensity, frequency of : Pain;in 12 weeks;Comment  Comment:: of left shoulder and upper back when performing household cleaning  Patient will return to: other activity;in 12 weeks;Comment;exercise  other activity: walking outside and light household cleaning including vacuum    Patient will decrease : YESENIA score;Quick Dash score;by _ points;in 12 weeks  by ___ points: 5      Plan / Patient Education:     Continue with initial plan of care.  Progress with home program as tolerated.  Patient is scheduled to follow up with provider on 9/10 and then may be leaving for [place] on 9/11.   Review theraband rowing and pulldowns, free weight with 2# for overhead pressup, wrist flex/extension, minisquats  Continue MT to left neck.  Add free weight for ER in sidelying and abduction  "standing to 45 degrees    Subjective:     Pain Ratin  Pain at left mid cervical paraspinals and left lateral forearm/ulnar surface  Saw [facility]/OT last visit and she felt KT was helpful also.  Is having a family meeting today to discuss cousin in hospice-stressful and may change travel plans      Objective:   Posture fair-wearing head garment so difficult to fully assess  Good demonstration of exercises  Added theraband for scap strengthening and 2# for shoulder strengthening    Exercises:  Exercise #1: posture education  Comment #1: doorway shoulder stretch bialteral 10 seconds 2x  Exercise #2: supine wand shoulder flexion stretch 5\" 5x  Comment #2: supine or seated shoulder ER stretch 5\" tx  Exercise #3: yellow theraband for rowing and pulldowns 10x  Comment #3: wrist flexion and extension with 2#  10x each  Exercise #4: overhead press with 2#  10x  Comment #4: mini squats 10x  Exercise #5: add UBE and ER and shoulder abduction with free weight        Treatment Today     TREATMENT MINUTES COMMENTS   Evaluation     Self-care/ Home management     Manual therapy 10 Supine 90/90 for MT to left cervical paraspinals next to C4-6 x 8' with 2' set up   Neuromuscular Re-education 5 KTape for left forearm ulnar surface from elbow to mid 5th lateral finger - 1 strip   Therapeutic Activity     Therapeutic Exercises 10 See flow sheet and ex above.  Gave yellow theraband for home use   Gait training     Modality__________________                Total 25    Blank areas are intentional and mean the treatment did not include these items.       Bernadine Shanks  2019    "

## 2021-06-01 NOTE — PATIENT INSTRUCTIONS - HE
Take the Symbicort twice daily, once in morning, once in evening.  Stop the Flovent all together.     Please use your albuterol rescue inhaler when you are wheezing (musical breathing).     Lung function tests showed that your asthma is good but the lung size is worse than in 2016 (93%-->76%). We are going to do a  CT scan to figure out why it has dropped. Please call for results if you do not hear from us within 24 hours of the scan.    Get your flu shot in October.    Call Valery with questions or worsened breathing [phone]. M-F 8-4pm

## 2021-06-01 NOTE — TELEPHONE ENCOUNTER
Called Roverto to let her know the CT found no scarring in her lungs which is very good news! Dr. March will review it with her when she comes back in 6 months for her follow up. No questions at this time.

## 2021-06-02 VITALS — BODY MASS INDEX: 33.82 KG/M2 | HEIGHT: 64 IN

## 2021-06-02 VITALS — BODY MASS INDEX: 33.9 KG/M2 | HEIGHT: 64 IN | WEIGHT: 198.6 LBS

## 2021-06-03 ENCOUNTER — RECORDS - HEALTHEAST (OUTPATIENT)
Dept: ADMINISTRATIVE | Facility: CLINIC | Age: 56
End: 2021-06-03

## 2021-06-03 VITALS
RESPIRATION RATE: 20 BRPM | BODY MASS INDEX: 33.77 KG/M2 | DIASTOLIC BLOOD PRESSURE: 66 MMHG | OXYGEN SATURATION: 96 % | HEART RATE: 56 BPM | WEIGHT: 199.8 LBS | SYSTOLIC BLOOD PRESSURE: 110 MMHG

## 2021-06-03 VITALS — WEIGHT: 199.2 LBS | HEIGHT: 65 IN | BODY MASS INDEX: 33.19 KG/M2

## 2021-06-03 VITALS — BODY MASS INDEX: 33.09 KG/M2 | HEIGHT: 65 IN | WEIGHT: 198.6 LBS

## 2021-06-03 VITALS — BODY MASS INDEX: 33.34 KG/M2 | HEIGHT: 65 IN | WEIGHT: 200.1 LBS

## 2021-06-03 NOTE — PATIENT INSTRUCTIONS - HE
Summary of Your Rheumatology Visit    Next Appointment:  4 Months    Medications:      Referrals:    [facility], Dr. Alvaro Condon      Tests:     Please have labs performed a few days prior to next visit.       Injections:        Other:

## 2021-06-03 NOTE — PROGRESS NOTES
Assessment and Plan:     1. Paresthesia  We will rule out vitamin B12 deficiency and diabetes.  Discussed possible carpal tunnel syndrome.  We also discussed the possibility of Raynaud's as patient has an autoimmune disease and she has noticed some erythema with her hands.  She will discuss this further with her rheumatologist.  She is not interested in seeing orthopedics for possible EMG.  - Vitamin B12  - Glycosylated Hemoglobin A1c    2. Rash  Patient has had an intermittent rash on her forearms.  She has been using triamcinolone cream as needed.  - triamcinolone (KENALOG) 0.1 % cream; Apply to the affected area twice daily as needed.  No longer than 2 weeks  Dispense: 454 g; Refill: 2    3. Moderate persistent asthma without complication  This is controlled.  She continues Flovent daily and albuterol as needed.  - FLOVENT HFA 44 mcg/actuation inhaler; INL 2 PFS PO BID  Dispense: 1 Inhaler; Refill: 2    4. Sicca syndrome (H)  She continues to see rheumatology.  She is content with the plan.        Subjective:     Roverto is a 54 y.o. female presenting to the clinic for multiple concerns today.  Patient has multiple comorbidities including prediabetes, hyperlipidemia, sicca syndrome, asthma, GERD, history of DCIS.  Patient states she has been experiencing numbness in her fingertips for the past 10 days.  This is been waking her up at night.  She describes the numbness as constant.  She has noticed more joint pain in her hands when there is cold weather exposure or at bedtime.  She has noticed an indentation in the fat pad of her thumb when she grasps objects.  She has also noticed some redness in her hands.  She is concerned of indentations of her fingernails. She has been taking aspirin with minimal relief relief.  She denies any injury to her hands.  She denies any repetitive wrist motion hobbies or activities throughout the day.  She does see rheumatology on 11/26/2019.  Patient has history of an elevated blood  pressure.  Patient has been monitoring her blood pressure at home and it has been ranging 120-130 systolic.  She denies chest pain, shortness of breath with exertion, edema, orthopnea, syncope.  Patient did not start the lisinopril.  Patient has asthma and uses Flovent daily and albuterol as needed.  She feels as though this is well controlled.  She has had a rash on her forearms and has been using triamcinolone cream as needed.  This is working well for her.    Review of Systems: A complete 14 point review of systems was obtained and is negative or as stated in the history of present illness.    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   Social Needs     Financial resource strain: Not on file     Food insecurity:     Worry: Not on file     Inability: Not on file     Transportation needs:     Medical: Not on file     Non-medical: Not on file   Tobacco Use     Smoking status: Never Smoker     Smokeless tobacco: Never Used   Substance and Sexual Activity     Alcohol use: No     Drug use: No     Sexual activity: Never   Lifestyle     Physical activity:     Days per week: Not on file     Minutes per session: Not on file     Stress: Not on file   Relationships     Social connections:     Talks on phone: Not on file     Gets together: Not on file     Attends Anabaptist service: Not on file     Active member of club or organization: Not on file     Attends meetings of clubs or organizations: Not on file     Relationship status: Not on file     Intimate partner violence:     Fear of current or ex partner: Not on file     Emotionally abused: Not on file     Physically abused: Not on file     Forced sexual activity: Not on file   Other Topics Concern     Not on file   Social History Narrative    Originally from  "[place].  3 grown children.  Emigrated from .       Active Ambulatory Problems     Diagnosis Date Noted     DCIS (ductal carcinoma in situ) 2014     Malignant neoplasm of lower-outer quadrant of female breast (H) 2014     GERD (gastroesophageal reflux disease) - awareness of throat - see note of 6/10/15      Vitamin D deficiency      Hypovitaminosis D  (< 20 in ) 06/10/2015     Obesity (BMI 30.0-34.9) 06/10/2015     Thyroid cyst 2015     Helicobacter pylori (H. pylori) 2016     Asthma 2016     Dyspnea 2016     Sicca syndrome (H) 01/15/2018     Prediabetes 2018     Mixed hyperlipidemia 2018     Neutropenia, unspecified type (H) 2019     Restrictive lung disease 09/10/2019     Resolved Ambulatory Problems     Diagnosis Date Noted     Backache      Hyperproteinemia      Eczema      Wheezing (Symptom)      Aneurysm Of The Left Internal Carotid Artery      Vaginal Pap Smear Abnormal Atypical Squamous Cells Of Undetermined Significance      Midback Pain      Skin: A Rash      Past Medical History:   Diagnosis Date     Breast cancer (H)      DCIS (ductal carcinoma in situ) of breast      Hx of radiation therapy        Family History   Problem Relation Age of Onset     Throat cancer Father          \"I think age 82.\"     Hypertension Mother      BRCA 1/2 Neg Hx        Objective:     /88   Pulse 60   Ht 5' 4.5\" (1.638 m)   Wt 198 lb (89.8 kg)   LMP 2014   SpO2 97%   BMI 33.46 kg/m      Patient is alert, in no obvious distress.   Skin: Warm, dry.  No lesions or rashes.  Skin turgor rapid return.   HEENT:  Head normocephalic, atraumatic.  Eyes normal.Ears normal.  Nose patent, mucosa pink.  Oropharynx mucosa pink.  No lesions or tonsillar enlargement.   Neck: Supple, no lymphadenopathy.   Lungs:  Clear to auscultation. Respirations even and unlabored.  No wheezing or rales noted.   Heart:  Regular rate and rhythm.  No " murmurs.   Musculoskeletal:  Full ROM of extremities.  She has some pitting noted when she presses on the fat pad of her thumb.  Sensations are intact.  Tinels and Phalen's are negative.

## 2021-06-03 NOTE — PROGRESS NOTES
"Roverto Gonzalez who presents today with a chief complaint of  Follow-up      Joint Pains: no   Location: forearms with tingling in fingers  Onset: chronic 2-3 years   Intensity: 0 /10  AM Stiffness: no  Alleviating/Aggravating Factors: Yes Medications helpful  Tolerating Meds: Yes tolerate   Other:      ROS:  Patient denies having any active: chest pain, shortness of breath, cough, abdominal pain, nausea,+ vomiting yesterday, +rashes comes/goes 1 years, documented fevers, oral ulcers and recent infections.+Patient admits to having a good appetite.  Information gathered by medical assistant incorporated into this note, was reviewed and discussed with the patient.    Problem List:  Patient Active Problem List   Diagnosis     DCIS (ductal carcinoma in situ)     Malignant neoplasm of lower-outer quadrant of female breast (H)     GERD (gastroesophageal reflux disease) - awareness of throat - see note of 6/10/15     Vitamin D deficiency     Hypovitaminosis D  (< 20 in )     Obesity (BMI 30.0-34.9)     Thyroid cyst     Helicobacter pylori (H. pylori)     Asthma     Dyspnea     Sicca syndrome (H)     Prediabetes     Mixed hyperlipidemia     Neutropenia, unspecified type (H)     Restrictive lung disease        PMH:   Past Medical History:   Diagnosis Date     Breast cancer (H)     s/p resection and radiation     DCIS (ductal carcinoma in situ) of breast     ER-NH-     GERD (gastroesophageal reflux disease)      Hx of radiation therapy 2014     Vitamin D deficiency        Surgical History:  Past Surgical History:   Procedure Laterality Date     BREAST BIOPSY Left      BREAST LUMPECTOMY Left        Family History:  Family History   Problem Relation Age of Onset     Throat cancer Father          \"I think age 82.\"     Hypertension Mother      BRCA 1/2 Neg Hx        Social History:   reports that she has never smoked. She has never used smokeless tobacco. She reports that she does not drink alcohol " "or use drugs.    Allergies:  No Known Allergies     Current Medications:  Current Outpatient Medications   Medication Sig Dispense Refill     acetaminophen (TYLENOL ORAL) Take 400 mg by mouth as needed.       albuterol (PROAIR HFA;PROVENTIL HFA;VENTOLIN HFA) 90 mcg/actuation inhaler Inhale 2 puffs every 6 (six) hours as needed for wheezing. 1 each 11     FLOVENT HFA 44 mcg/actuation inhaler INL 2 PFS PO BID 1 Inhaler 2     naproxen sodium (ALEVE ORAL) Take 200 mg by mouth as needed.       triamcinolone (KENALOG) 0.1 % cream Apply to the affected area twice daily as needed.  No longer than 2 weeks 454 g 2     No current facility-administered medications for this visit.            Physical Exam:  /70   Pulse (!) 56   Ht 5' 4.5\" (1.638 m)   Wt 201 lb 8 oz (91.4 kg)   LMP 02/02/2014   BMI 34.05 kg/m    General: A & O x 3 in NAD  HEENT: EOMI, Non injected/non icteric sclera, no oral lesions noted  CV: s1s2 with RRR, no rubs appreciated   Lungs: CTA B/L, no wheezing , rales or rhonci appreciated  GI: Soft, NT/ND, no rebound, no guarding noted  MS: Palpating hand joints did not reproduce any pains, no warmth erythema or synovitis noted.  Negative Tinel's and Phalen's bilaterally.  Palpating forearms did not reproduce any pains.  Passive range of motion testing of shoulders and hips did not reproduce any pains.  5/5 strength involving upper/lower proximal extremities bilaterally.  Otherwise patient demonstrated good passive/active ROM over other joints with no warmth, erythema, tenderness or synovitis noted over these joints.        Summary/Assessment:    History that includes positive Jamila 1 antibody, pulmonary rhonchi, left lateral epicondylitis, osteoarthritis, myofascial pains.    States that shoulder, epicondylitis and upper back pains have improved.    Now has some discomfort involving forearms with paresthesias involving fingertips, more so at night.  Negative Tinel's and Phalen's bilaterally on exam today.  " Patient may still have carpal tunnel syndrome, is willing to see neurology and have EMG performed.    Patient's pulmonary symptoms have stabilized, being treated for asthma.  Although noted to have component of restrictive pattern on pulmonary function test.  Recent CT scan of chest did not show any signs of interstitial lung disease    States performing exercises provided by OT.    Takes Tylenol as needed.    Denies having Raynaud's-like symptoms (no worsening of hand symptoms with cold weather exposure)    On exam demonstrated good proximal/distal strength involving upper/lower extremities bilaterally.      Although ESR/CRP levels are mildly elevated, current levels are stable and of questionable etiology at this time, will continue to monitor and correlate clinically.    Muscle enzymes within normal limits.    No clear signs of synovitis noted exam today.    Please see below for management plan.    From prior note: However now complains of having left shoulder discomfort sometimes radiating down left arm.  Denies having any numbness or tingling or associated neck pain.  Given physical exam findings left shoulder pains may be related to rotator cuff tendinopathy.    Also complains of having occasional mid/upper thoracic back pain.    Denies having any active skin lesions at this time, states improved.  Did not pursue referral to dermatology.    Has persistently elevated ESR/CRP levels of questionable etiology at this time.  Perhaps related to pulmonary symptoms.      Pertinent rheumatology/past medical history (please refer to above for more detailed history):      Positive Jamila 1 antibody    Elevated sed rate/CRP (mild)    Chronic cough/diffuse rhonchi/wheezing    Left lateral epicondylitis    Hx of left third digit discomfort     Left knee osteoarthritis    Myofascial pain/nonrestorative sleep    Dry mouth (negative Sjogren antibodies)     Generalized pruritus (without rash onset > 1 year)    Pruritic rash  (6/2018)    Overweight    Vitamin D deficiency    Elevated ALT (mild)    Neutropenia (mild)    Left shoulder pain    Mid/upper thoracic back pain    Hand paresthesias, bilateral    Asthma (also has restrictive pattern on PFT, CT of chest negative, followed by pulmonary)          Rheumatology medications provided/suggested:    Tylenol, as needed  Voltaren gel, as needed      Pertinent medication from other providers or from otc (please refer to above for more detailed med list):    Aleve, as needed      Pertinent medications already tried:           Pertinent lab history:    Positive Jamila 1 antibody    Noted to have negative/unremarkable: Rheumatoid factor, CCP antibody, other ALEYDA related subsets.      Pertinent imaging/test history:    X-rays of left knee from July 2013 show some signs of DJD.    Chest x-ray from June 2016 was unremarkable.    From May 2018:  x-rays of left elbow and left hand were unremarkable.    Other:        Plan:        Continue Tylenol 500-1000 mg daily-twice daily PRN for arthralgias/myalgias.    If necessary continue taking Voltaren gel as needed over left lateral epicondyle region and left third digit A1 pulley region.    Has left armband to be used with activities that reproduce left elbow pains.      Given anti-Jamila 1 antibody and elevated ESR/CRP we will continue to monitor for any signs and symptoms consistent with having an autoimmune/connective tissue disease and manage accordingly.    Continue following up with recommendations provided by pulmonary.  Currently using inhalers with some benefit.    Continue performing exercises as tolerated provided by PT for chronic left shoulder pains and upper back pains.    Continue performing exercises as tolerated provided by OT for sensations of distal forearm/hand weakness.    Will refer to neurology for bilateral hand paresthesias.  Patient saw Dr. Alvaro Condon in 2017 for a different complaint, desires to see same provider.    Recheck labs prior  to next visit.    Follow-up in 4 months, sooner as needed.      Procedure note:     Spent 40 minutes with greater than half of this time spent with the patient going over differential diagnosis, prognosis, treatment plan, medication side effects and  answering questions.    This note was transcribed using Dragon voice recognition software as a result unintentional grammatical errors or word substitutions may have occurred. Please contact our Rheumatology department if you need any clarification or if you have any related inquiries.      Jose Naqvi DO....................  11/26/2019   2:08 PM

## 2021-06-04 VITALS
OXYGEN SATURATION: 97 % | HEIGHT: 65 IN | WEIGHT: 198 LBS | HEART RATE: 60 BPM | BODY MASS INDEX: 32.99 KG/M2 | SYSTOLIC BLOOD PRESSURE: 138 MMHG | DIASTOLIC BLOOD PRESSURE: 88 MMHG

## 2021-06-04 VITALS
BODY MASS INDEX: 33.57 KG/M2 | WEIGHT: 201.5 LBS | DIASTOLIC BLOOD PRESSURE: 70 MMHG | SYSTOLIC BLOOD PRESSURE: 132 MMHG | HEART RATE: 56 BPM | HEIGHT: 65 IN

## 2021-06-04 VITALS
OXYGEN SATURATION: 98 % | DIASTOLIC BLOOD PRESSURE: 66 MMHG | BODY MASS INDEX: 33.28 KG/M2 | SYSTOLIC BLOOD PRESSURE: 116 MMHG | HEART RATE: 63 BPM | WEIGHT: 196.9 LBS

## 2021-06-04 NOTE — TELEPHONE ENCOUNTER
I sent a prescription for Lisinopril 5 mg daily.  I recommend a nurse only BP recheck in 2 weeks. Thanks.

## 2021-06-04 NOTE — TELEPHONE ENCOUNTER
Please see below message and advise    Patient is requesting to start a blood pressure medication and per patient this was previously discussed but doesn't remember the name of the medication.    Reviewed chart and on 7/22/2019 it was recommended she begin lisinopril 10 mg once daily but the patient was unsure if she would take this medication.     Patient is wondering if she can take lisinopril 5 mg once daily?       rx pended for approval if appropriate.  Do you want her to schedule a nurse only visit in 2 weeks?

## 2021-06-04 NOTE — TELEPHONE ENCOUNTER
"Call review:  Patient called stating she recently saw her provider and her blood pressures have been running high - patient did not know the name of the medication but states the provider attempted to give her a 10 mg tablet and she did not want it. Because her blood pressures continue to be high every day. The patient states. \"She knows what I'm talking about if you send the message\".  She is willing to start a blood pressure medication but wants to start at 5 mg.   "

## 2021-06-04 NOTE — TELEPHONE ENCOUNTER
Left message to call back for: medication question  Information to relay to patient:  Below message. Please help schedule a nurse only blood pressure check in 2 weeks.

## 2021-06-04 NOTE — TELEPHONE ENCOUNTER
Medication Request  Medication name: Blood pressure   Pharm[facility] Name and [id number]    Reason for request: patient stated that her blood pressure is going high. Patient stated that she took her bp about 2 hr ago and it was.  158/80. Patient stated that she would like to start on a 5mg instead of 10 mg.     When did you use medication last?:  n/a  Patient offered appointment:  n/a  Okay to leave a detailed message: yes

## 2021-06-04 NOTE — TELEPHONE ENCOUNTER
Patient Returning Call  Reason for call:  Patient called back.  Information relayed to patient:  Informed of Anisha Ramirez NP message listed below.  Patient states understanding and agrees with plan. States is going out of town and will have her blood pressure check done when she gets back.  Patient has additional questions:  No  If YES, what are your questions/concerns:    Okay to leave a detailed message?: No call back needed

## 2021-06-05 VITALS
WEIGHT: 197.4 LBS | SYSTOLIC BLOOD PRESSURE: 138 MMHG | HEART RATE: 60 BPM | DIASTOLIC BLOOD PRESSURE: 82 MMHG | BODY MASS INDEX: 32.85 KG/M2

## 2021-06-05 VITALS — HEIGHT: 65 IN | WEIGHT: 193 LBS | BODY MASS INDEX: 32.15 KG/M2

## 2021-06-05 VITALS
WEIGHT: 195.5 LBS | OXYGEN SATURATION: 97 % | HEIGHT: 66 IN | BODY MASS INDEX: 31.42 KG/M2 | SYSTOLIC BLOOD PRESSURE: 132 MMHG | HEART RATE: 53 BPM | DIASTOLIC BLOOD PRESSURE: 78 MMHG

## 2021-06-05 NOTE — PROGRESS NOTES
Assessment and Plan:     1. Counseling about travel  Patient does not want any vaccines today.  Provided second hepatitis A and typhoid vaccines.  Will obtain hepatitis B and MMR titers for for future travel.  She believes she did receive these when she immigrated to the [place].  Provided prescription for Malarone to use for malaria prophylaxis.  Educated on indications and side effects.  She is up-to-date on yellow fever and polio vaccines.  She is not interested in the rabies vaccine.  - atovaquone-proguaniL (MALARONE) 250-100 mg Tab; Take 1 tablet by mouth daily with breakfast.  Dispense: 53 tablet; Refill: 0  - Hepatitis A adult 2 dose IM (19 yr & older)  - Typhoid, Inactive, Inj  - Hepatitis B Surface Antibody (Anti-HBs) Vaccine Check  - Rubella Antibody, IgG  - Mumps Antibody, IgG  - Rubeola Antibody, IgG    2. Lymphadenopathy  Due to a sicca syndrome, she is at high risk of lymphoma.  Will check hemogram today.  Offered ultrasound, but she declines.  If lymph nodes are still present when she returns from her trip, will obtain ultrasound at that time.  - HM2(CBC w/o Differential)    3. Neutropenia, unspecified type (H)  She has a history of neutropenia.  Will recheck today.    4. Sicca syndrome (H)  She continues to see rheumatology.    5. Malignant neoplasm of lower-outer quadrant of left female breast, unspecified estrogen receptor status (H)  She is in remission.    6. Rash  She continues triamcinolone cream as needed.  - triamcinolone (KENALOG) 0.1 % cream; Apply to the affected area twice daily as needed.  No longer than 2 weeks  Dispense: 454 g; Refill: 2        Subjective:     Roverto is a 55 y.o. female presenting to the clinic for a travel consult.  Patient is leaving tomorrow with a friend to visit her sister in [place] and [facility].  She will be traveling for 45 days.  Patient had a yellow fever vaccine in 2011.  She is unsure what vaccines she had when she immigrated to the [place].  She  is feeling well today.  She denies recent cold symptoms including rhinorrhea, postnasal drainage, cough, headache, stomachache, nausea, vomiting, fever.  She did notice some right cervical lymph nodes that developed 2 weeks ago.  She states they are not painful.  She believes they are increasing in size.  She has lost 3 to 4 pounds recently unintentionally.  Patient does have a history of sicca syndrome and breast cancer which is in remission.  She denies fatigue, abdominal pain or discomfort. She requests refill of triamcinolone cream which she uses as needed for a rash on her hands and back.     Review of Systems: A complete 14 point review of systems was obtained and is negative or as stated in the history of present illness.    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   Social Needs     Financial resource strain: Not on file     Food insecurity:     Worry: Not on file     Inability: Not on file     Transportation needs:     Medical: Not on file     Non-medical: Not on file   Tobacco Use     Smoking status: Never Smoker     Smokeless tobacco: Never Used   Substance and Sexual Activity     Alcohol use: No     Drug use: No     Sexual activity: Never   Lifestyle     Physical activity:     Days per week: Not on file     Minutes per session: Not on file     Stress: Not on file   Relationships     Social connections:     Talks on phone: Not on file     Gets together: Not on file     Attends Yazidism service: Not on file     Active member of club or organization: Not on file     Attends meetings of clubs or organizations: Not on file     Relationship status: Not on file     Intimate partner violence:     Fear of current or ex partner: Not on file     Emotionally abused: Not on file     Physically abused: Not  "on file     Forced sexual activity: Not on file   Other Topics Concern     Not on file   Social History Narrative    Originally from [place].  3 grown children.  Emigrated from .       Active Ambulatory Problems     Diagnosis Date Noted     DCIS (ductal carcinoma in situ) 2014     Malignant neoplasm of lower-outer quadrant of female breast (H) 2014     GERD (gastroesophageal reflux disease) - awareness of throat - see note of 6/10/15      Vitamin D deficiency      Hypovitaminosis D  (< 20 in ) 06/10/2015     Obesity (BMI 30.0-34.9) 06/10/2015     Thyroid cyst 2015     Helicobacter pylori (H. pylori) 2016     Asthma 2016     Dyspnea 2016     Sicca syndrome (H) 01/15/2018     Prediabetes 2018     Mixed hyperlipidemia 2018     Neutropenia, unspecified type (H) 2019     Restrictive lung disease 09/10/2019     Resolved Ambulatory Problems     Diagnosis Date Noted     Backache      Hyperproteinemia      Eczema      Wheezing (Symptom)      Aneurysm Of The Left Internal Carotid Artery      Vaginal Pap Smear Abnormal Atypical Squamous Cells Of Undetermined Significance      Midback Pain      Skin: A Rash      Past Medical History:   Diagnosis Date     Breast cancer (H)      DCIS (ductal carcinoma in situ) of breast      Hx of radiation therapy        Family History   Problem Relation Age of Onset     Throat cancer Father          \"I think age 82.\"     Hypertension Mother      BRCA 1/2 Neg Hx        Objective:     /66 (Patient Site: Right Arm, Cuff Size: Adult Large)   Pulse 63   Wt 196 lb 14.4 oz (89.3 kg)   LMP 2014   SpO2 98%   BMI 33.28 kg/m      Patient is alert, in no obvious distress.   Skin: Warm, dry.  No lesions or rashes.  Skin turgor rapid return.   HEENT:  Head normocephalic, atraumatic.  Eyes normal.   Ears normal.  Nose patent, mucosa pink.  Oropharynx mucosa pink.  No lesions or tonsillar enlargement. "   Neck: Supple, She has a small 1/2 cm right anterior cervical lymph node and a smaller right posterior cervical lymph node.    Lungs:  Clear to auscultation. Respirations even and unlabored.  No wheezing or rales noted.   Heart:  Regular rate and rhythm.  No murmurs, S3, S4, gallops, or rubs.

## 2021-06-05 NOTE — TELEPHONE ENCOUNTER
Medication Question or Clarification  Who is calling: patient  What medication are you calling about (include dose and sig)?:   albuterol (PROAIR HFA;PROVENTIL HFA;VENTOLIN HFA) 90 mcg/actuation inhaler 1 each 11 9/10/2019     Sig - Route: Inhale 2 puffs every 6 (six) hours as needed for wheezing. - Inhalation    Sent to pharmacy as: albuterol (PROAIR HFA;PROVENTIL HFA;VENTOLIN HFA) 90 mcg/actuation inhaler    Notes to Pharmacy: May substitute the equivalent medication per insurance preference.    E-Prescribing Status: Receipt confirmed by pharmacy (9/10/2019  8:43 AM CDT)        Who prescribed the medication?: Anisha Ramirez CNP  What is your question/concern?: I am leaving out of the country and request this medication to cover me through March.  The pharmacy stated I must contact Anisha Ramirez CNP to approve this.  Please contact the pharmacy to assist me to have this medication coverage for February and March.  Requested [facility]acy: [facility] to leave a detailed message?: Yes

## 2021-06-05 NOTE — TELEPHONE ENCOUNTER
Refill Approved    Rx renewed per Medication Renewal Policy. Medication was last renewed on 12/5/19.    Laura Olivia, [facility] Triage/Med Refill 1/8/2020     Requested Prescriptions   Pending Prescriptions Disp Refills     lisinopril (PRINIVIL,ZESTRIL) 5 MG tablet [Pharmacy Med Name: LISINOPRIL 5MG TABLETS] 30 tablet 0     Sig: TAKE 1 TABLET(5 MG) BY MOUTH DAILY       [facility] [employer] Refill Protocol Passed - 1/8/2020 12:48 PM        Passed - PCP or prescribing provider visit in past 12 months       Last office visit with prescriber/PCP: 11/22/2019 Anisha Ramirez CNP OR same dept: 11/22/2019 Anisha Ramirez CNP OR same specialty: 11/22/2019 Anisha Ramirez CNP  Last physical: 3/25/2019 Last MTM visit: Visit date not found   Next visit within 3 mo: Visit date not found  Next physical within 3 mo: Visit date not found  Prescriber OR PCP: Anisha Ramirez CNP  Last diagnosis associated with med order: 1. Benign essential hypertension  - lisinopril (PRINIVIL,ZESTRIL) 5 MG tablet [Pharmacy Med Name: LISINOPRIL 5MG TABLETS]; TAKE 1 TABLET(5 MG) BY MOUTH DAILY  Dispense: 30 tablet; Refill: 0    If protocol passes may refill for 12 months if within 3 months of last provider visit (or a total of 15 months).             Passed - Serum Potassium in past 12 months     Lab Results   Component Value Date    Potassium 4.3 07/22/2019             Passed - Blood pressure filed in past 12 months     BP Readings from Last 1 Encounters:   11/26/19 132/70             Passed - Serum Creatinine in past 12 months     Creatinine   Date Value Ref Range Status   07/22/2019 0.74 0.60 - 1.10 mg/dL Final

## 2021-06-07 NOTE — TELEPHONE ENCOUNTER
Patient has an appointment scheduled for today with Anisha Ramirez CNP to follow-up on labs.     What labs is she following up on? Does she have other concerns?         *please notify patient we need to change her appointment to a telephone visit as we are limiting in clinic visits at this time.

## 2021-06-07 NOTE — TELEPHONE ENCOUNTER
RN cannot approve Refill Request    RN can NOT refill this medication med is not covered by policy/route to provider     . Last office visit: 2/4/2020 Anisha Ramirez CNP Last Physical: 3/25/2019 Last MTM visit: Visit date not found Last visit same specialty: 2/4/2020 Anisha Ramirez CNP.  Next visit within 3 mo: Visit date not found  Next physical within 3 mo: Visit date not found      Pippa Brewer, [facility] Triage/Med Refill 5/1/2020    Requested Prescriptions   Pending Prescriptions Disp Refills     FLOVENT HFA 44 mcg/actuation inhaler [Pharmacy Med Name: FLOVENT HFA 44MCG ORAL INH 120INH] 1 Inhaler 11     Sig: INHALE 2 PUFFS BY MOUTH TWICE DAILY       There is no refill protocol information for this order

## 2021-06-08 NOTE — PROGRESS NOTES
"HPI: Roverto Gonzalez is a 52 y.o. female referred to see me by Anisha Ramirez CNP for evaluation of a right shoulder mass.  She notes discovering a small, firm mass on her right lateral shoulder in 2016.  She does not recall any trauma to this area.  Notes that it is tender with palpation but otherwise is fairly asymptomatic.  Due to her history of breast cancer she wanted to ensure this is not something sinister. Denies any drainage or fluctuating size of the mass.  She denies any other lumps or masses elsewhere on her body.  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    Allergies:Review of patient's allergies indicates no known allergies.    Past Medical History:   Diagnosis Date     Breast cancer     s/p resection and radiation     DCIS (ductal carcinoma in situ) of breast     ER-HI-     GERD (gastroesophageal reflux disease)      Hx of radiation therapy      Vitamin D deficiency        Past Surgical History:   Procedure Laterality Date     BREAST BIOPSY Left 2014     BREAST LUMPECTOMY Left        CURRENT MEDS:    Current Outpatient Prescriptions:      albuterol (PROVENTIL HFA;VENTOLIN HFA) 90 mcg/actuation inhaler, Inhale 2 puffs every 4 (four) hours as needed., Disp: 1 Inhaler, Rfl: 0     fluticasone (FLONASE) 50 mcg/actuation nasal spray, 1 spray into each nostril as needed., Disp: , Rfl: 11    Family History   Problem Relation Age of Onset     Throat cancer Father       \"I think age 82.\"     Hypertension Mother      BRCA 1/2 Neg Hx         reports that she has never smoked. She has never used smokeless tobacco. She reports that she does not drink alcohol or use illicit drugs.    Review of Systems:  The 10 point review of systems  is within normal limits except for as mentioned above in the HPI.  General ROS: No complaints or constitutional symptoms  Skin: As noted in HPI  Hematologic/Lymphatic: No symptoms or " "complaints  Psychiatric: No symptoms or complaints  Endocrine: No excessive fatigue, no hypermetabolic symptoms reported  Respiratory ROS: no cough, shortness of breath, or wheezing  Cardiovascular ROS: no chest pain or dyspnea on exertion  Gastrointestinal ROS: No gastrointestinal complaints  Musculoskeletal ROS: no recent injuries reported  Neurological ROS: no focal neurologic defects reported.        Visit Vitals     /72     Pulse (!) 57     Resp 18     Ht 5' 5\" (1.651 m)     Wt 198 lb (89.8 kg)     LMP 02/02/2014     SpO2 95%     BMI 32.95 kg/m2     Body mass index is 32.95 kg/(m^2).    EXAM:  General : Alert, cooperative, appears stated age   Skin: Right upper extremity examined; a small, 1cm, minimally mobile mass that appears deep to the subcutaneous tissue is noted on the right lateral shoulder just over the belly of the deltoid.  Mild tenderness to palpation.  On palpation it feels as though it is arising from the muscle or fascia of the muscle itself.  No overlying skin changes.  No erythema.  No abnormal moles cuts or lesions on the arm distally.  Several hypertrophic scars on her arm.  Lymphatic: No obvious adenopathy, no swelling   Eyes: No scleral icterus, pupils equal  HENT: no traumatic injury to the head or face, no gross abnormalities  Lungs: Normal respiratory effort  Heart: Regular rate and rhythm  Abdomen: Nondistended  Musculoskeletal: No obvious swelling,  Neurologic: Grossly intact      LABS:  Lab Results   Component Value Date    WBC 4.9 02/14/2017    WBC 4.4 02/16/2015    HGB 13.6 02/14/2017    HCT 39.6 02/14/2017    MCV 84 02/14/2017     02/14/2017     INR/Prothrombin Time    Results from last 7 days  Lab Units 02/14/17  1022   LN-SODIUM mmol/L 141   LN-POTASSIUM mmol/L 3.9   LN-CHLORIDE mmol/L 107   LN-CO2 mmol/L 26   LN-BLOOD UREA NITROGEN mg/dL 11   LN-CREATININE mg/dL 0.70   LN-CALCIUM mg/dL 9.3     Lab Results   Component Value Date    ALT 30 02/14/2017    AST 23 " 02/14/2017    ALKPHOS 92 02/14/2017    BILITOT 0.5 02/14/2017       Assessment/Plan:   1. Mass of skin of right shoulder        Roverto Gonzalez is a 52 y.o. female with a right shoulder mass that I cannot easily identify because of.  I am fairly certain this is not a lipoma, epidermal inclusion cyst, or sebaceous cyst.  It feels to be deeper than the subcutaneous fatty tissues.  Without a notable history of trauma to the area, it is uncertain if this is a calcified area of scar tissue.  Having her next best step to identify this is to arrange for ultrasound with biopsy.  Explained the rationale starting with ultrasound and proceeding to more invasive tests if necessary.  Patient was in agreement with this plan.  We'll contact her once the results of ultrasound and biopsy are back.      Magan Shah M.D.   [phone]  [facility] Department of Surgery

## 2021-06-08 NOTE — PROGRESS NOTES
Subjective:     Roverto is a 52 y.o. female presenting to the clinic for a female physical.     LMP: multiple years ago   Hx of abnormal pap smear: 1999, colposcopy,normal since   Last pap smear: 12/7/15 negative HPV   Perform self-breast exams: yes  Vaginal discharge or irritation: none  Sexually active:  for 21 years   Contraception: none  Concerns for STDs: none  Previous pregnancies:three pregnancy, vaginal delivery     Patient has a history of DCIS of the left breast status post lumpectomy in June 2014. She has a history of questionable brain aneurysm and has seen neurology in the past. 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  She has asthma and uses Ventolin as needed. She has not needed the inhaler for 8 months. She is seeing a spine specialist at [facility] for chronic lumbar pain and requests referral today.  Also, patient is concerned about a lump that she has palpated within her right shoulder.  She noticed this in December.  States the lump is painful and has increased in size.  She denies any injury.  She has not noticed any bruising or skin discoloration.  She feels as though she has full range of motion of the shoulder. She has been experiencing mild fatigue.  She states she sleeps well at night and does not snore.     Review of systems:  I performed a 10 point review of systems.  All pertinent positives and negatives are noted in the HPI. All others are negative.     No Known Allergies    Current Outpatient Prescriptions on File Prior to Visit   Medication Sig Dispense Refill     albuterol (PROVENTIL HFA;VENTOLIN HFA) 90 mcg/actuation inhaler Inhale 2 puffs every 4 (four) hours as needed. 1 Inhaler 0     cholecalciferol, vitamin D3, 3,000 unit Tab Take 3,000 mg by mouth daily.       No current facility-administered medications on file prior to visit.        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  3 grown children.  Emigrated from .       Past Medical History:   Diagnosis Date     Breast cancer     s/p resection and radiation     DCIS (ductal carcinoma in situ) of breast     ER-DC-     GERD (gastroesophageal reflux disease)      Hx of radiation therapy      Vitamin D deficiency        Family History   Problem Relation Age of Onset     Throat cancer Father       \"I think age 82.\"     Hypertension Mother      BRCA / Neg Hx        Past Surgical History:   Procedure Laterality Date     BREAST BIOPSY Left      BREAST LUMPECTOMY Left        Objective:     Vitals:    17 0953   BP: 122/62   Pulse: 73   SpO2: 96%       Patient is alert, no obvious distress.   Skin: Warm, dry.  No rashes or lesions. Skin turgor rapid return.   HEENT:  Eyes normal.  Ears normal.  Nose patent, mucosa pink.  Oropharynx mucosa pink, no lesions or tonsil enlargement.   Neck:  Supple, without lymphadenopathy, bruits, JVD. Thyroid normal texture and size.    Lungs:  Clear to auscultation.  No wheezing, rales noted.  Respirations even and unlabored.   Heart:  Regular rate and rhythm.  No murmurs.   Breasts:  Normal.  No surrounding adenopathy.   Abdomen: Soft, nontender.  No organomegaly.  Bowel sounds normoactive.  No guarding or masses noted.   :  External genitalia normal.  Normal vaginal mucosa.  Cervix no lesions or cervical motion tenderness. Uterus normal size, no masses.  Adnexa no masses palpated bilaterally.   Musculoskeletal:  Full ROM of extremities.  Muscle strength equal +5/5. She has a " marble sized palpable soft tissue mass present within her right mid deltoid.  It feels mobile and is tender to palpation.    Neurological:  Cranial nerves 2-12 intact.      Assessment and Plan:     1. Routine general medical examination at a health care facility  Discussed consuming a healthy diet and exercising.  Discussed with parents for routine sunscreen.  She states she has her mammogram scheduled.  Will place referral for colonoscopy.  She declines shingles and pneumonia vaccines.  - Ambulatory referral for Colonoscopy  - Lipid Cascade    2. Chronic bilateral low back pain without sciatica  3. Chronic bilateral thoracic back pain  Patient has an appointment with [facility] this afternoon for a second opinion.  - Ambulatory referral to Spine Care    4. Obesity  The following high BMI interventions were performed this visit: exercise promotion: strength training, exercise promotion: stretching and nutrition therapy    5. Vitamin D deficiency  - Vitamin D, Total (25-Hydroxy)    6. Fatigue, unspecified type  Discussed good sleep hygiene.  Will notify her of the results.  Will rule out anemia and thyroid disorder.  She has had a rheumatological work-up in the past.   - HM2(CBC w/o Differential)  - Comprehensive Metabolic Panel  - Thyroid Cascade    7. Soft tissue mass  Discussed possible cyst versus lipoma.  Patient would like to see a general surgeon for consult.  She is content with the plan.  - Ambulatory referral to General Surgery

## 2021-06-10 NOTE — PROGRESS NOTES
"HPI: Roverto Gonzalez is a 52 y.o. female who returns for evaluation of a right shoulder mass.  She was seen by me initially in February of this year for this issue.  We had arranged for undergo ultrasound evaluation and possible biopsy of this at that time, however imaging at that time did not identify a discrete mass.  She notes that since that time, the site is gotten slightly larger and more tender to touch.  Again denies any overlying skin changes.     Allergies:Review of patient's allergies indicates no known allergies.    Past Medical History:   Diagnosis Date     Breast cancer     s/p resection and radiation     DCIS (ductal carcinoma in situ) of breast     ER-ME-     GERD (gastroesophageal reflux disease)      Hx of radiation therapy      Vitamin D deficiency        Past Surgical History:   Procedure Laterality Date     BREAST BIOPSY Left      BREAST LUMPECTOMY Left        CURRENT MEDS:  No current outpatient prescriptions on file.    Family History   Problem Relation Age of Onset     Throat cancer Father       \"I think age 82.\"     Hypertension Mother      BRCA 1/2 Neg Hx         reports that she has never smoked. She has never used smokeless tobacco. She reports that she does not drink alcohol or use illicit drugs.    Review of Systems:  The 10 point review of systems  is within normal limits except for as mentioned above in the HPI.  General ROS: No complaints or constitutional symptoms  Skin: As noted in HPI  Hematologic/Lymphatic: No symptoms or complaints  Psychiatric: No symptoms or complaints  Endocrine: No excessive fatigue, no hypermetabolic symptoms reported  Respiratory ROS: no cough, shortness of breath, or wheezing  Cardiovascular ROS: no chest pain or dyspnea on exertion  Gastrointestinal ROS: No gastrointestinal complaints  Musculoskeletal ROS: no recent injuries reported  Neurological ROS: no focal neurologic defects reported.        /61 (Patient Site: Right Arm, " "Patient Position: Sitting, Cuff Size: Adult Large)  Pulse 62  Ht 5' 5\" (1.651 m)  Wt 197 lb 8 oz (89.6 kg)  LMP 02/02/2014  SpO2 97%  Breastfeeding? No  BMI 32.87 kg/m2  Body mass index is 32.87 kg/(m^2).    EXAM:  General : Alert, cooperative, appears stated age   Skin: Right upper extremity examined; a small, 1cm, minimally mobile mass, firm mild tenderness to palpation.  No overlying skin changes.  No erythema.  No abnormal moles cuts or lesions on the arm distally.  Several hypertrophic scars on her arm.  Lymphatic: No obvious adenopathy, no swelling   Eyes: No scleral icterus, pupils equal  HENT: no traumatic injury to the head or face, no gross abnormalities  Lungs: Normal respiratory effort  Heart: Regular rate and rhythm  Abdomen: Nondistended  Musculoskeletal: No obvious swelling,  Neurologic: Grossly intact      LABS:  Lab Results   Component Value Date    WBC 4.9 02/14/2017    WBC 4.4 02/16/2015    HGB 13.6 02/14/2017    HCT 39.6 02/14/2017    MCV 84 02/14/2017     02/14/2017     INR/Prothrombin Time      Lab Results   Component Value Date    ALT 30 02/14/2017    AST 23 02/14/2017    ALKPHOS 92 02/14/2017    BILITOT 0.5 02/14/2017     Imaging: Ultrasound obtained February 23, 2017    FINDINGS: Targeted ultrasound was performed in the location of the lump on the patient's right shoulder as located by the patient. The lump felt superficial. In this region there was subcutaneous fat but no definite mass. It is most likely that this is a  small subcutaneous lipoma, though the margins were difficult to distinguish on ultrasound.      Assessment/Plan:   1. Mass of skin of right shoulder        Roverto Gonzalez is a 52 y.o. female with a right shoulder mass that continues to cause her discomfort.  I remain unconvinced that this is a lipoma given the imaging findings and physical exam findings.  With her discomfort, I do think it is reasonable to proceed excisional biopsy of this, since previous " attempt at ultrasound-guided biopsy was not successful.  We discussed the risks and benefits of surgery including risk of bleeding and infection as well as pain at the site.  We will plan on doing this under monitored anesthetic care with local anesthetic at her earliest convenience.  No need for further preoperative evaluation.      Magan Shah M.D.   [phone]  [facility] Department of Surgery

## 2021-06-10 NOTE — PROGRESS NOTES
Roverto is scheduled for right arm excisional biopsy with Dr. Shah on 5/22/17 at [facility]. Patient was given instructions of arrival time, need a , and NPO after midnight. Patient verbalized understanding.    Rukhsana Hernandez [facility]  Physician    [facility]   [phone]

## 2021-06-12 NOTE — TELEPHONE ENCOUNTER
Authorizing: Jose Naqvi, DO in [facility]     Referral: [id number] (Pending Review)           Priority: Routine   Diagnosis: Sensation of feeling hot [R68.89]   Comments   Sensations of feeling hot mainly at night. Also notices episodic erythematous discoloration of fingertips.     Is Lizzy able to treat or should patient be seen by [facility] End[facility]?

## 2021-06-12 NOTE — TELEPHONE ENCOUNTER
I have been communicating (via messaging) with patient's PCP Anisha. Patient apparently still bothered by persistent symptoms with elevated sed rate and  finding an underlying etiology given elevated sed rate.     If desires, we can order a nuclear bone scan and associate with complaints/diagnosis of myalgias, chronic fatigue, chronic multiple joint pain, elevated sed rate, elevated Jamila-1 antibody.    Other option is we can place referral for second opinion from another rheumatologist (preferably at [facility] or [facility]).

## 2021-06-12 NOTE — PROGRESS NOTES
"Roverto Gonzalez who presents today with a chief complaint of  Follow-up      Joint Pains: yes  Location: knees, sometimes fingers  Onset: chronic  Intensity: 3 /10  AM Stiffness: 2 Minutes  Alleviating/Aggravating Factors: walking helps  Tolerating Meds: n/a  Other:      ROS:  Patient denies having any chest pain, shortness of breath, cough, abdominal pain, nausea, vomiting, rashes, fevers, oral ulcers and recent infections.  Patient admits to having a good appetite      Problem List:  Patient Active Problem List   Diagnosis     DCIS (ductal carcinoma in situ)     Malignant neoplasm of lower-outer quadrant of female breast (H)     GERD (gastroesophageal reflux disease) - awareness of throat - see note of 6/10/15     Vitamin D deficiency     Hypovitaminosis D  (< 20 in )     Obesity (BMI 30.0-34.9)     Thyroid cyst     Helicobacter pylori (H. pylori)     Asthma     Dyspnea     Sicca syndrome (H)     Prediabetes     Mixed hyperlipidemia     Neutropenia, unspecified type (H)     Restrictive lung disease        PMH:   Past Medical History:   Diagnosis Date     Breast cancer (H)     s/p resection and radiation     DCIS (ductal carcinoma in situ) of breast     ER-IN-     GERD (gastroesophageal reflux disease)      Hx of radiation therapy 2014     Vitamin D deficiency        Surgical History:  Past Surgical History:   Procedure Laterality Date     BREAST BIOPSY Left 2014     BREAST LUMPECTOMY Left        Family History:  Family History   Problem Relation Age of Onset     Throat cancer Father          \"I think age 82.\"     Hypertension Mother      BRCA 1/2 Neg Hx        Social History:   reports that she has never smoked. She has never used smokeless tobacco. She reports that she does not drink alcohol or use drugs.    Allergies:  No Known Allergies     Current Medications:  Current Outpatient Medications   Medication Sig Dispense Refill     albuterol (PROAIR HFA;PROVENTIL HFA;VENTOLIN HFA) 90 " mcg/actuation inhaler Inhale 2 puffs every 6 (six) hours as needed for wheezing. 1 each 11     atovaquone-proguaniL (MALARONE) 250-100 mg Tab Take 1 tablet by mouth daily with breakfast. 53 tablet 0     FLOVENT HFA 44 mcg/actuation inhaler INHALE 2 PUFFS BY MOUTH TWICE DAILY 1 Inhaler 11     lisinopril (PRINIVIL,ZESTRIL) 5 MG tablet Take 1 tablet (5 mg total) by mouth daily. 90 tablet 3     triamcinolone (KENALOG) 0.1 % cream Apply to the affected area twice daily as needed.  No longer than 2 weeks 454 g 2     No current facility-administered medications for this visit.            Physical Exam:  Following up today via video visit, per Covid-19 pandemic requirements.    Verbal consent has been obtained for this service by care team member.    Video call start time: 12:20 PM    Video call end time:  12:33 PM    Dox[employer]ity utilized for video call.    Phone number utilized: [phone]    Patient location for video visit: Home     Provider location for video visit:  Home (working remotely)    Summary/Assessment:    History that includes positive Jamila 1 antibody, pulmonary rhonchi, left lateral epicondylitis, osteoarthritis, myofascial pains.    Presents for follow-up visit.    States was out of the country from February up until just a few days ago, visiting family in [facility].    Adds that joint pains have been stable however has been feeling warm at night, has been postmenopausal for a few years now.  Also describes having some episodic erythema involving fingertips not related to weather change.  Unable to detect erythematous fingertips on video exam today.  Adds has been drinking more milk while in [facility] and is going to try to cut back and see if this helps.    Desires to have labs repeated, including ESR and CRP levels which have been chronically elevated of unclear etiology.    Still has some paresthesias involving forearms and fingertip, more so at night.  Negative Tinel's and Phalen's bilaterally on prior exam.   On prior visit referred to neurology, did not pursue referral, desires repeat referral which .    Managed by pulmonary for  Asthma (Although noted to have component of restrictive pattern on pulmonary function test.  CT scan of chest did not show any signs of interstitial lung disease).    Takes Tylenol as needed.    Currently not taking any vitamin D.    Please see below for management plan.    From prior note:     Denies having Raynaud's-like symptoms (no worsening of hand symptoms with cold weather exposure)    On prior exam demonstrated good proximal/distal strength involving upper/lower extremities bilaterally.      Muscle enzymes within normal limits.    Although ESR/CRP levels are mildly elevated, current levels are stable and of questionable etiology at this time, will continue to monitor and correlate clinically.  Perhaps related to pulmonary symptoms.    However now complains of having left shoulder discomfort sometimes radiating down left arm.  Denies having any numbness or tingling or associated neck pain.  Given physical exam findings left shoulder pains may be related to rotator cuff tendinopathy.    Also complains of having occasional mid/upper thoracic back pain.    Denies having any active skin lesions at this time, states improved.  Did not pursue referral to dermatology.      Pertinent rheumatology/past medical history (please refer to above for more detailed history):      Positive Jamila 1 antibody    Elevated sed rate/CRP (mild)    Chronic cough/diffuse rhonchi/wheezing    Left lateral epicondylitis    Hx of left third digit discomfort     Left knee osteoarthritis    Myofascial pain/nonrestorative sleep    Dry mouth (negative Sjogren antibodies)     Generalized pruritus (without rash onset > 1 year)    Pruritic rash (2018)    Overweight    Vitamin D deficiency    Elevated ALT (mild)    Neutropenia (mild)    Left shoulder pain    Mid/upper thoracic back pain    Hand paresthesias,  bilateral    Asthma (also has restrictive pattern on PFT, CT of chest negative, followed by pulmonary)          Rheumatology medications provided/suggested:    Tylenol, as needed  Voltaren gel, as needed      Pertinent medication from other providers or from otc (please refer to above for more detailed med list):    Aleve, as needed      Pertinent medications already tried:           Pertinent lab history:    Elevated: ESR, CRP    Positive Jamila 1 antibody    Noted to have negative/unremarkable: Rheumatoid factor, CCP antibody, other ALEYDA related subsets, ANCA, ACE level, CK, aldolase, myoglobin, SPEP/UPEP.      Pertinent imaging/test history:    X-rays of left knee from July 2013 show some signs of DJD.    Chest x-ray from June 2016 was unremarkable.    From May 2018:  x-rays of left elbow and left hand were unremarkable.    Other:        Plan:        Continue Tylenol 500-1000 mg daily-twice daily PRN for arthralgias/myalgias.    If necessary continue taking Voltaren gel as needed over left lateral epicondyle region and left third digit A1 pulley region.    Has left armband to be used with activities that reproduce left elbow pains.      Given anti-Jamila 1 antibody and elevated ESR/CRP we will continue to monitor for any signs and symptoms consistent with having an autoimmune/connective tissue disease and manage accordingly.    Continue following up with recommendations provided by pulmonary.  Currently using inhalers with some benefit.    Continue performing exercises as tolerated provided by PT for chronic left shoulder pains and upper back pains.    Continue performing exercises as tolerated provided by OT for sensations of distal forearm/hand weakness.    Will again refer to neurology for bilateral hand paresthesias.  Patient saw Dr. Alvaro Condon in 2017 for a different complaint, desires to see same provider.    Will refer to endocrinology for sensations of feeling hot at night, erythematous fingertips (episodic),  will also correlate with lab sent off.  Also plans on discussing further with PCP, has upcoming appointment.    Follow-up in 4 months, sooner as needed.      Procedure note:       This note was transcribed using Dragon voice recognition software as a result unintentional grammatical errors or word substitutions may have occurred. Please contact our Rheumatology department if you need any clarification or if you have any related inquiries.    Major side effect profile of medications provided were discussed with the patient.      Jose Naqvi DO      ....................  10/14/2020   11:13 AM

## 2021-06-12 NOTE — TELEPHONE ENCOUNTER
----- Message from Jose Naqvi DO sent at 11/1/2020  2:08 AM CST -----  Please mention to the patient:    Vitamin D level was mildly diminished, suggest replenishing with 50,000 units qweek x 12 weeks (12 doses), no refills.  Thereafter recommend taking over-the-counter vitamin D 1000 units daily.    CRP nonspecific inflammatory marker improved and is now within normal limits.  Other nonspecific inflammatory marker called sedimentation rate improved from 44-34, current level fine when corrected for age/gender.  These levels need to be correlated clinically.    Otherwise remainder of lab results were unremarkable.

## 2021-06-12 NOTE — TELEPHONE ENCOUNTER
With regards to nuclear bone scan.  Can you please inquire, lately where she has been experiencing most of her pains?

## 2021-06-12 NOTE — TELEPHONE ENCOUNTER
Pt states most of her pain is in her back, legs and fingers but has pain all over her body. Pt states she also has some numbness and tingling in her fingers and toes at times. Pt states she did mention this to her PCP as well.

## 2021-06-12 NOTE — TELEPHONE ENCOUNTER
"Thanks for feedback, regarding \"back\" are current pains over low back, mid back or upper back?    "

## 2021-06-12 NOTE — TELEPHONE ENCOUNTER
[email or web address]    Next Appointment:  4 Months     Medications:        Referrals:     Endocrinology     Neurology     Tests:      Please have labs that were ordered performed.

## 2021-06-12 NOTE — TELEPHONE ENCOUNTER
Pt notified and she would like to have NM bone scan as Dr Naqvi suggests.     Please verify orders are correct

## 2021-06-12 NOTE — PATIENT INSTRUCTIONS - HE
Summary of Your Rheumatology Visit    Next Appointment:  4 Months    Medications:      Referrals:    Endocrinology    Neurology    Tests:     Please have labs that were ordered performed.       Injections:        Other:

## 2021-06-15 NOTE — PROGRESS NOTES
Assessment and Plan:     1. Acute bronchitis, unspecified organism  Unfortunately, we do not have x-ray available in the clinic.  Patient requires transportation to get to another clinic for x-ray, so she declines this today.  Due to abnormal lung sounds upon auscultation, will treat with Z-Rickey, take as directed.  Educated on its indications and side effects.  Discussed symptomatic treatment.  - azithromycin (ZITHROMAX Z-RICKEY) 250 MG tablet; Take two tablets (500 mg) by mouth daily x 1, then one tablet (250 mg) by mouth daily on days #2-5  Dispense: 6 tablet; Refill: 0    2. Mild intermittent asthma with exacerbation  Patient declines oral prednisone.  Will treat with Ventolin inhaler as needed.  - albuterol (PROAIR HFA;PROVENTIL HFA;VENTOLIN HFA) 90 mcg/actuation inhaler; Inhale 2 puffs every 4 (four) hours as needed.  Dispense: 1 Inhaler; Refill: 0    3. Sicca syndrome  Patient's chronic pain could certainly be related to her diagnosis of sicca syndrome.  Patient was not aware that she was diagnosed with this and has multiple questions today.  Will refer to rheumatology for further evaluation and discussion of treatment options.  - Ambulatory referral to Rheumatology    4. Chronic midline thoracic back pain  Discussed symptomatic treatment including rest, ice, stretching activities.  Patient has completed physical therapy with no relief.  We will see if symptoms may be related to her rheumatological disease.  I stressed the importance of seeing rheumatology. She is content with the plan.    Subjective:     Roverto is a 53 y.o. female presenting to the clinic for multiple concerns today.  Patient has been experiencing cold symptoms for 1 week.  She complains of a nonproductive cough, shortness of breath, chest tightness, wheezing, postnasal drainage.  She denies sinus congestion, headache, stomachache, nausea, vomiting, and fever.  She has not tried any over-the-counter products for symptoms.  Her friend is also ill.   Patient states she is prone to cold symptoms every few months.  On 2/4/16, she had normal PFT.  She has been diagnosed with asthma in the past.  She does not use an inhaler routinely.  Patient is also concerned of chronic back pain for 10 years.  She was seen at the [facility] spine clinic where there was some concern regarding an autoimmune disease.  Patient was referred to rheumatology where she was diagnosed with sicca syndrome. On 2/17/15 Jamila-1 Autoantibody was 47,  ALEYDA 2.4, Immunoglobulin A 450,  c-reactive protein 1.0.  Patient is not aware that she was diagnosed with this.  She continues to experience intermittent sharp mid thoracic pain.  Patient states the pain will last for 3 minutes.  Symptoms have worsened since she changed her mattress a few months ago.  Pain is worse with sitting or laying down to sleep.  She also experiences pain when she lifts heavy objects.  She had an MRI of her cervical and thoracic spine on 6/4/14 showing the following:    IMPRESSION:   CONCLUSION:  MRI CERVICAL SPINE:  1.  No cervical cord pathology.  2.  Slight disc degenerative changes at several levels  3.  loss of the normal lordosis.     MRI THORACIC SPINE:   1. No thoracic cord pathology.  2. Mildly exaggerated mid thoracic kyphosis with multilevel midthoracic disc degenerative changes.      Review of Systems: A complete 14 point review of systems was obtained and is negative or as stated in the history of present illness.    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  3 grown children.  Emigrated from 1996.       Active  "Ambulatory Problems     Diagnosis Date Noted     DCIS (ductal carcinoma in situ) 2014     Malignant neoplasm of lower-outer quadrant of female breast 2014     GERD (gastroesophageal reflux disease) - awareness of throat - see note of 6/10/15      Vitamin D deficiency      Hypovitaminosis D  (< 20 in ) 06/10/2015     Obesity (BMI 30.0-34.9) 06/10/2015     Thyroid cyst 2015     Helicobacter pylori (H. pylori) 2016     Asthma 2016     Dyspnea 2016     Sicca syndrome 01/15/2018     Resolved Ambulatory Problems     Diagnosis Date Noted     Backache      Hyperproteinemia      Eczema      Wheezing (Symptom)      Aneurysm Of The Left Internal Carotid Artery      Vaginal Pap Smear Abnormal Atypical Squamous Cells Of Undetermined Significance      Midback Pain      Skin: A Rash      Past Medical History:   Diagnosis Date     Breast cancer      DCIS (ductal carcinoma in situ) of breast      GERD (gastroesophageal reflux disease)      Hx of radiation therapy      Vitamin D deficiency        Family History   Problem Relation Age of Onset     Throat cancer Father       \"I think age 82.\"     Hypertension Mother      BRCA /2 Neg Hx        Objective:     /70  Ht 5' 5\" (1.651 m)  Wt 199 lb 9.6 oz (90.5 kg)  LMP 2014  BMI 33.22 kg/m2    Patient is alert, in no obvious distress.   Skin: Warm, dry.  No lesions or rashes.  Skin turgor rapid return.   HEENT:  Head normocephalic, atraumatic.  Eyes normal. Ears normal.  Nose patent, mucosa red.  Oropharynx slightly erythematous.  No lesions or tonsillar enlargement.   Neck: Supple, no lymphadenopathy.   Lungs: Expiratory wheezing heard throughout lung fields.  Inspiratory rhonchi heard in bilateral lung bases. Respirations even and unlabored.  No crackles or rales noted.   Heart:  Regular rate and rhythm.  No murmurs.   Musculoskeletal:  Full ROM of extremities.  DTRs symmetrical, sensations intact.  No " obvious deformity.  Muscle strength equal +5/5. She is non-tender to palpation of the thoracic musculature.   Neurological:  Cranial nerves 2-12 intact.

## 2021-06-15 NOTE — PROGRESS NOTES
Assessment and Plan:   1. Benign essential hypertension     2. Lymphadenopathy  HM2(CBC w/o Differential)   3. Cough     4. Sicca syndrome (H)     5. Malignant neoplasm of lower-outer quadrant of left female breast, unspecified estrogen receptor status (H)     6. Moderate persistent asthma without complication       Blood pressure is suboptimal today.  She has had normal readings at home.  She has had a cough for 6 months which has slowly improved as she has decreased the number of times she takes her lisinopril in a week.  Will discontinue lisinopril and see if the cough resolves.  Offered chest x-ray today, but she declines.  She continues Flovent daily and albuterol as needed for asthma.  Due to lymphadenopathy, will check hemogram today.  She is to follow-up in 2 to 3 weeks for blood pressure recheck.  If blood pressure is elevated, may consider initiating losartan.  She continues to see rheumatology for sicca syndrome.  She is in remission from breast cancer.  She is content with the plan.      Subjective:     Roverto is a 56 y.o. female presenting to the clinic for concerns for a lump that she has palpated in her right posterior neck.  Patient noticed the lump 5 weeks ago.  It fluctuates in size.  She has had a cough for 6 months.  She states this developed when she returned from [place].  She has had some left ear pain and pressure.  She denies rhinorrhea, postnasal drainage, headache, stomachache, nausea, vomiting, fever.  She does have asthma and uses Flovent daily and albuterol as needed.  She would like to discontinue her lisinopril.  Her blood pressure typically ranges 120-125 systolic at home.  She has only been taking the lisinopril 2 times per week.  She continues to see rheumatology for sicca syndrome.  She is taking vitamin D supplementation for vitamin D deficiency.  She has a history of breast cancer in in remission.    Review of Systems: A complete 14 point review of systems was obtained and is  negative or as stated in the history of present illness.    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   Social Needs     Financial resource strain: Not on file     Food insecurity     Worry: Not on file     Inability: Not on file     Transportation needs     Medical: Not on file     Non-medical: Not on file   Tobacco Use     Smoking status: Never Smoker     Smokeless tobacco: Never Used   Substance and Sexual Activity     Alcohol use: No     Drug use: No     Sexual activity: Never   Lifestyle     Physical activity     Days per week: Not on file     Minutes per session: Not on file     Stress: Not on file   Relationships     Social connections     Talks on phone: Not on file     Gets together: Not on file     Attends Jew service: Not on file     Active member of club or organization: Not on file     Attends meetings of clubs or organizations: Not on file     Relationship status: Not on file     Intimate partner violence     Fear of current or ex partner: Not on file     Emotionally abused: Not on file     Physically abused: Not on file     Forced sexual activity: Not on file   Other Topics Concern     Not on file   Social History Narrative    Originally from [place].  3 grown children.  Emigrated from 1996.       Active Ambulatory Problems     Diagnosis Date Noted     DCIS (ductal carcinoma in situ) 07/18/2014     Malignant neoplasm of lower-outer quadrant of female breast (H) 08/21/2014     GERD (gastroesophageal reflux disease) - awareness of throat - see note of 6/10/15      Vitamin D deficiency      Hypovitaminosis D  (< 20 in December, 2014) 06/10/2015     Obesity (BMI 30.0-34.9) 06/10/2015     Thyroid cyst 12/08/2015     Helicobacter pylori (H. pylori) 02/03/2016     Asthma 02/09/2016     Sicca  "syndrome (H) 01/15/2018     Prediabetes 2018     Mixed hyperlipidemia 2018     Restrictive lung disease 09/10/2019     Benign essential hypertension 2021     Resolved Ambulatory Problems     Diagnosis Date Noted     Backache      Hyperproteinemia      Eczema      Wheezing (Symptom)      Aneurysm Of The Left Internal Carotid Artery      Vaginal Pap Smear Abnormal Atypical Squamous Cells Of Undetermined Significance      Midback Pain      Skin: A Rash      Dyspnea 2016     Neutropenia, unspecified type (H) 2019     Past Medical History:   Diagnosis Date     Breast cancer (H)      DCIS (ductal carcinoma in situ) of breast      Hx of radiation therapy        Family History   Problem Relation Age of Onset     Throat cancer Father          \"I think age 82.\"     Hypertension Mother      BRCA 1/2 Neg Hx        Objective:     /82 (Patient Site: Right Arm, Patient Position: Sitting, Cuff Size: Adult Regular)   Pulse 60   Wt 197 lb 6.4 oz (89.5 kg)   LMP 2014   BMI 32.85 kg/m      Patient is alert, in no obvious distress.   Skin: Warm, dry.  No lesions or rashes.  Skin turgor rapid return.   HEENT:  Head normocephalic, atraumatic.  Eyes normal.  Ears normal.  Nose patent, mucosa pink.  Oropharynx mucosa pink.  No lesions or tonsillar enlargement.   Neck: Supple, small 1/2 cm upper posterior cervical lymph node is present.  Lungs:  Clear to auscultation. Respirations even and unlabored.  No wheezing or rales noted.   Heart:  Regular rate and rhythm.  No murmurs.               "

## 2021-06-16 ENCOUNTER — OFFICE VISIT (OUTPATIENT)
Dept: NEUROLOGY | Facility: CLINIC | Age: 56
End: 2021-06-16
Payer: COMMERCIAL

## 2021-06-16 VITALS
SYSTOLIC BLOOD PRESSURE: 137 MMHG | HEART RATE: 56 BPM | HEIGHT: 65 IN | DIASTOLIC BLOOD PRESSURE: 75 MMHG | BODY MASS INDEX: 32.65 KG/M2 | WEIGHT: 196 LBS

## 2021-06-16 DIAGNOSIS — R20.0 HAND NUMBNESS: ICD-10-CM

## 2021-06-16 DIAGNOSIS — I67.1 CEREBRAL ANEURYSM, NONRUPTURED: ICD-10-CM

## 2021-06-16 DIAGNOSIS — I10 ESSENTIAL HYPERTENSION: ICD-10-CM

## 2021-06-16 DIAGNOSIS — H93.8X2 PRESSURE SENSATION IN LEFT EAR: ICD-10-CM

## 2021-06-16 DIAGNOSIS — I10 HYPERTENSION, UNSPECIFIED TYPE: Primary | ICD-10-CM

## 2021-06-16 PROCEDURE — 99214 OFFICE O/P EST MOD 30 MIN: CPT | Performed by: PSYCHIATRY & NEUROLOGY

## 2021-06-16 RX ORDER — BENZONATATE 200 MG/1
CAPSULE ORAL
COMMUNITY
Start: 2021-05-20 | End: 2022-12-08

## 2021-06-16 RX ORDER — LOSARTAN POTASSIUM 25 MG/1
25 TABLET ORAL DAILY
Qty: 90 TABLET | Refills: 0 | Status: SHIPPED | OUTPATIENT
Start: 2021-06-16 | End: 2021-09-03

## 2021-06-16 RX ORDER — FLUTICASONE PROPIONATE 44 MCG
AEROSOL WITH ADAPTER (GRAM) INHALATION
COMMUNITY
Start: 2021-05-09 | End: 2022-03-15

## 2021-06-16 ASSESSMENT — MIFFLIN-ST. JEOR: SCORE: 1469.93

## 2021-06-16 NOTE — PROGRESS NOTES
Assessment and Plan:    1. Routine general medical examination at a health care facility  Discussed consuming a healthy diet and exercising.  Discussed importance routine sunscreen.  Recommend taking a daily multivitamin.  She is up-to-date on mammogram and colonoscopy.  She declines influenza vaccine today.    2. Diabetes mellitus screening  - Glucose    3. Lipid screening  - Lipid Cascade    4. Sicca syndrome  She has an appointment with rheumatology in March.    5. Mild intermittent asthma without complication  She continues Ventolin as needed.    6. Vitamin D deficiency  She is taking 1000 units of vitamin D3 daily.  - Vitamin D, Total (25-Hydroxy)    7. Obesity (BMI 30.0-34.9)  The following high BMI interventions were performed this visit: exercise promotion: strength training, exercise promotion: stretching and nutrition therapy        Subjective:     Roverto is a 53 y.o. female presenting to the clinic for a female physical.     LMP: four years ago  Hx of abnormal pap smear: none  Last pap smear: 2015 normal, negative HPV   Perform self-breast exams: yes   Vaginal discharge or irritation: none   Sexually active: yes,    Contraception: none   Concerns for STDs: none   Previous pregnancies:four pregnancies (one miscarriage and 3 vaginal deliveries)     Patient has history of left breast cancer which was treated at [facility].  She had a biopsy and lumpectomy June 2014.  Patient continues to obtain mammograms through [facility].  Patient was diagnosed with sicca syndrome in the past.  She was not aware of this.  Will have patient see rheumatology March 6.  Patient has intermittent asthma.  She declines any recent flares.  She has a history of vitamin D deficiency.  She is taking 1000 units of vitamin D3 daily.    Review of systems:  I performed a 10 point review of systems.  All pertinent positives and negatives are noted in the HPI. All others are negative.     No Known Allergies    Current Outpatient Prescriptions  "on File Prior to Visit   Medication Sig Dispense Refill     albuterol (PROAIR HFA;PROVENTIL HFA;VENTOLIN HFA) 90 mcg/actuation inhaler Inhale 2 puffs every 4 (four) hours as needed. 1 Inhaler 0     azithromycin (ZITHROMAX Z-RICKEY) 250 MG tablet Take two tablets (500 mg) by mouth daily x 1, then one tablet (250 mg) by mouth daily on days #2-5 6 tablet 0     No current facility-administered medications on file prior to visit.        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  3 grown children.  Emigrated from .       Past Medical History:   Diagnosis Date     Breast cancer     s/p resection and radiation     DCIS (ductal carcinoma in situ) of breast     ER-IA-     GERD (gastroesophageal reflux disease)      Hx of radiation therapy      Vitamin D deficiency        Family History   Problem Relation Age of Onset     Throat cancer Father       \"I think age 82.\"     Hypertension Mother      BRCA 1/2 Neg Hx        Past Surgical History:   Procedure Laterality Date     BREAST BIOPSY Left      BREAST LUMPECTOMY Left        Objective:     Vitals:    18 0724   BP: 114/66   Pulse: (!) 58       Patient is alert, no obvious distress.   Skin: Warm, dry.  No rashes or lesions. Skin turgor rapid return.   HEENT:  Eyes normal.  Ears normal.  Nose patent, mucosa pink.  Oropharynx mucosa pink, no lesions or tonsil enlargement.   Neck:  Supple, without lymphadenopathy, bruits, JVD. Thyroid normal texture and size.    Lungs:  Clear to auscultation.  No wheezing, rales noted.  Respirations even and unlabored. "   Heart:  Regular rate and rhythm.  No murmurs.   Breasts:  Normal.  No surrounding adenopathy.   Abdomen: Soft, nontender.  No organomegaly.  Bowel sounds normoactive.  No guarding or masses noted.   :  deferred  Musculoskeletal:  Full ROM of extremities.  Muscle strength equal +5/5.   Neurological:  Cranial nerves 2-12 intact.

## 2021-06-16 NOTE — NURSING NOTE
Chief Complaint   Patient presents with     Follow Up     Hand numbess, no change since the last visit.      Eagle Hutson MA on 6/16/2021 at 10:38 AM

## 2021-06-16 NOTE — LETTER
6/16/2021         RE: Roverto Gonzalez  [address]  [facility] [address]        Dear Colleague,    Thank you for referring your patient, Roverto Gonzalez, to the [facility] [place]. Please see a copy of my visit note below.    NEUROLOGY OUTPATIENT PROGRESS NOTE  Jun 16, 2021     CHIEF COMPLAINT/REASON FOR VISIT/REASON FOR CONSULT  Patient presents with:  Follow Up: Hand numbess, no change since the last visit.     REASON FOR CONSULTATION- Numbness    REFERRAL SOURCE  Dr. Finkelstein  CC Dr. Finkelstein    HISTORY OF PRESENT ILLNESS  Roverto Gonzalez is a 56 year old female seen today for evaluation of hand numbness.  Reports that the numbness came on in 3 months ago.  All 5 fingers are involved.  Is present on both sides.  The symptoms come and go.  Washing the dishes does make the symptoms come on.  She does have some left-sided weakness as well.  She does have chronic neck pain more on the left side.  Denies any shooting pain down the arm.  She is done physical therapy in the past which is helped the neck pain.  Reports no recent falls or injuries.  Occasionally will have intermittent numbness in her legs here and there.    Patient does have a history of a previous aneurysm that was being managed by Dr. Novoas.  Patient reports no ongoing headaches there is continued some pressure in the left side of her head.  Has not had a repeat scan for several years.  No family history of aneurysm.  No smoking history.    3/16/21  Patient returns today.  Reports that he continues to have hand numbness and hand pain.  This is mainly in the left hand in the fourth and fifth digit.  She continues to have neck pain as well.  Further reports of pressure in her ear.  Reports no headaches.  Reports it is mainly in the year.  She further was identified to have a aneurysm on her MRA.  Discussed significance of this.  Discussed treatment options.  Discussed about routine surveillance.    She has been working  with her primary care doctor to get the blood pressure under good control.  She was having some dizziness due to high blood pressure medication and this has been decreased.  She further reports no's history of aneurysm risk factors.  No family history of aneurysms.  No history of tobacco abuse.  Reports no headaches.    Ear pressure is in the ear.  Further reports tinnitus.  Reports no headaches.    6/16/21  Patient returns today.  Reports that she had to travel to [place] and has not been able to do the testing/therapy that had recommended.      She reports that the numbness in her hands is intermittent.  She has not done any physical therapy.  Feels that these are getting better.  Continues to have pressure in the years.  Is seeing the ENT doctor.  Has not been able to have the appointment so far.    Her MRI has shown an aneurysm in the past.  Reports no headaches.  We further talked about her blood pressure she reports that her blood pressure has been under good control though more recently she is been having a lot of cough.  She feels that she stopped the lisinopril and the cough goes away.  Discussed about side effects of the lisinopril and she would like me to change the medication for her.    Previous history is reviewed and this is unchanged.    PAST MEDICAL/SURGICAL HISTORY  Past Medical History:   Diagnosis Date     Breast cancer (H) 2014    lumpectomy on left with radiation      Hypertension      Patient Active Problem List   Diagnosis     Malignant neoplasm of left female breast, unspecified estrogen receptor status, unspecified site of breast (H)     Malignant neoplasm of lower-outer quadrant of female breast (H)     Vitamin D deficiency     Thyroid cyst     Restrictive lung disease     Prediabetes     Sicca syndrome (H)     Mixed hyperlipidemia       FAMILY HISTORY  Family History   Problem Relation Age of Onset     Cancer Father    Negative for neuropathy or aneurysm    SOCIAL HISTORY  Social History  "    Tobacco Use     Smoking status: Never Smoker     Smokeless tobacco: Never Used   Substance Use Topics     Alcohol use: No     Drug use: No       SYSTEMS REVIEW  Twelve-system ROS was done and other than the HPI this was negative.     MEDICATIONS  albuterol (PROAIR HFA/PROVENTIL HFA/VENTOLIN HFA) 108 (90 Base) MCG/ACT inhaler, Inhale 2 puffs into the lungs  benzonatate (TESSALON) 200 MG capsule,   fluticasone (FLOVENT HFA) 44 MCG/ACT inhaler, INHALE 2 PUFFS BY MOUTH TWICE DAILY  vitamin D2 (ERGOCALCIFEROL) 06164 units (1250 mcg) capsule, TK 1 C PO Q 7 DAYS    No current facility-administered medications on file prior to visit.        PHYSICAL EXAMINATION  VITALS: /75 (BP Location: Right arm, Patient Position: Sitting)   Pulse 56   Ht 1.651 m (5' 5\")   Wt 88.9 kg (196 lb)   BMI 32.62 kg/m    GENERAL: Healthy appearing, alert, no acute distress, normal habitus.  CARDIOVASCULAR: Extremities warm and well-perfused.  NEUROLOGICAL:  Patient is awake and oriented to self, place and time.  Attention span is normal.  Memory is grossly intact.  Language is fluent and follows commands appropriately.  Appropriate fund of knowledge. Cranial nerves 2-12 are intact. There is no pronator drift.  Motor exam shows 5/5 strength in all extremities.  Tone is symmetric bilaterally in upper and lower extremities.  (Previously reflexes are symmetric and 2+ in upper extremities and lower extremities. Sensory exam is grossly intact to light touch, pin prick and vibration. ) Finger to nose and heel to shin is without dysmetria.  Romberg is negative.  Gait is normal and the patient is able to do tandem walk and walk on toes and heels.     DIAGNOSTICS  MRI  HEAD MRI:   1.  Stable exam.  2.  No mass, hemorrhage or stroke.  3.  Stable mild/moderate burden of nonspecific white matter T2   prolongation. The appearance is not consistent with demyelination.   Findings may be secondary to chronic small vessel ischemic change.     HEAD " MRA:   1.  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 No further follow-up of this finding is recommended.  2.  No intradural aneurysm.  3.  No high-grade stenosis.    OUTSIDE RECORDS  Outside referral notes were reviewed and pertinent information has been summarized;-patient was referred on October 14, 2020.  Was seen by Dr. Naqvi.  Patient was having joint pain.  Had elevated ESR and CRP.  Was also having some paresthesias in the forearm and fingertips.  This is more at night.  She negative Tinel's and Phalen's.  She is also been referred to neurology in the past but she did not pursue that.  Was seen by Dr. Mcclain in the past.  This was in July 2017.  Patient at the time was complaining of some back pain.  Patient did have an MRI which did show an aneurysm.  Patient was recommended to have a repeat scan in 5 years.  She was told to follow-up with her primary care doctor for her chronic back pain.    EMG     CLINICAL INTERPRETATION:  This is an abnormal nerve conduction and EMG study.  The needle study shows chronic left ulnar denervation but no active denervation findings.  Further clinical correlation is needed.     MRI C spine - images reviewed.  IMPRESSION:   1.  Minimal cervical spondylosis without spinal canal or foraminal stenosis. No spinal cord signal abnormality.    MRA - images personally reviewed  IMPRESSION:   1.  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   2.  Unchanged extradural 1-2 mm proximal cavernous left internal carotid artery aneurysm.  3.  Focal 2 mm outpouching arising medially from the right A1-2 junction likely represents the normal anterior commuting indicating artery rather than a true saccular aneurysm.    IMPRESSION/REPORT/PLAN  Hand numbness with neck pain  Cerebral aneurysm, nonruptured  Pressure in ear  History of hypertension  Hypertension with coughing    This is a 56 year old female with new onset of  numbness in the hands.  MRI cervical spine was noncontributory.  EMG does show evidence of chronic left ulnar denervation though no active entrapment neuropathy.  Have improved on their own.  Would recommend that she follow through with physical therapy to see how she does. We discussed gabapentin as a treatment option though she wants to hold off on medication and that would not cure her symptoms.    Her MRI did show a left MCA 3 mm aneurysm.  She also has extra dural cavernous left ICA aneurysm.  Discussed the significance of these.  Since he is a stable we will hold off on surgical options.  She would need to get another scan every year.  Further discussed her risk factors.  She does not smoke.  There is no family history of aneurysms.  She does have a history of hypertension which could be a risk factor here.  Encourage her to work with the primary care doctor get the hypertension under good control.    She has been having coughing with lisinopril.  Discussed blood pressure goal would be less than 120.  We will switch her to losartan to see if that helps.  She does need to stay establish with her primary care doctor for further management of blood pressure.    In terms of her ear pressure I put in a referral for ENT.  She does not have a headache and this is not a head pressure.    Follow back in 3 months.    -     PHYSICAL THERAPY REFERRAL; Future  -     OTOLARYNGOLOGY REFERRAL; Future  -     losartan (COZAAR) 25 MG tablet; Take 1 tablet (25 mg) by mouth daily    Return in about 3 months (around 9/16/2021) for In-Clinic Visit.    Over 30 minutes were spent coordinating the care for the patient on the day of the encounter.  This includes previsit, during visit and post visit activities as documented above.  (Activities include but not inclusive of reviewing chart, reviewing outside records, reviewing labs and imaging study results as well as the images, patient visit time including getting history and exam,   use if applicable, review of test results with the patient and coming up with a plan in a shared model, counseling patient and family, education and answering patient questions, EMR , EMR diagnosis entry and problem list management, medication reconciliation and prescription management if applicable, paperwork if applicable, printing documents and documentation of the visit activities.)    Malik Del Cid MD  Neurologist  [facility]  Tel:- [phone]    This note was dictated using voice recognition software.  Any grammatical or context distortions are unintentional and inherent to the software.        Again, thank you for allowing me to participate in the care of your patient.        Sincerely,        Malik Del Cid MD

## 2021-06-16 NOTE — PROGRESS NOTES
NEUROLOGY OUTPATIENT PROGRESS NOTE  Jun 16, 2021     CHIEF COMPLAINT/REASON FOR VISIT/REASON FOR CONSULT  Patient presents with:  Follow Up: Hand numbess, no change since the last visit.     REASON FOR CONSULTATION- Numbness    REFERRAL SOURCE  Dr. Finkelstein  CC Dr. Finkelstein    HISTORY OF PRESENT ILLNESS  Roverto Gonzalez is a 56 year old female seen today for evaluation of hand numbness.  Reports that the numbness came on in 3 months ago.  All 5 fingers are involved.  Is present on both sides.  The symptoms come and go.  Washing the dishes does make the symptoms come on.  She does have some left-sided weakness as well.  She does have chronic neck pain more on the left side.  Denies any shooting pain down the arm.  She is done physical therapy in the past which is helped the neck pain.  Reports no recent falls or injuries.  Occasionally will have intermittent numbness in her legs here and there.    Patient does have a history of a previous aneurysm that was being managed by Dr. Novoas.  Patient reports no ongoing headaches there is continued some pressure in the left side of her head.  Has not had a repeat scan for several years.  No family history of aneurysm.  No smoking history.    3/16/21  Patient returns today.  Reports that he continues to have hand numbness and hand pain.  This is mainly in the left hand in the fourth and fifth digit.  She continues to have neck pain as well.  Further reports of pressure in her ear.  Reports no headaches.  Reports it is mainly in the year.  She further was identified to have a aneurysm on her MRA.  Discussed significance of this.  Discussed treatment options.  Discussed about routine surveillance.    She has been working with her primary care doctor to get the blood pressure under good control.  She was having some dizziness due to high blood pressure medication and this has been decreased.  She further reports no's history of aneurysm risk factors.  No family history of  aneurysms.  No history of tobacco abuse.  Reports no headaches.    Ear pressure is in the ear.  Further reports tinnitus.  Reports no headaches.    6/16/21  Patient returns today.  Reports that she had to travel to [place] and has not been able to do the testing/therapy that had recommended.      She reports that the numbness in her hands is intermittent.  She has not done any physical therapy.  Feels that these are getting better.  Continues to have pressure in the years.  Is seeing the ENT doctor.  Has not been able to have the appointment so far.    Her MRI has shown an aneurysm in the past.  Reports no headaches.  We further talked about her blood pressure she reports that her blood pressure has been under good control though more recently she is been having a lot of cough.  She feels that she stopped the lisinopril and the cough goes away.  Discussed about side effects of the lisinopril and she would like me to change the medication for her.    Previous history is reviewed and this is unchanged.    PAST MEDICAL/SURGICAL HISTORY  Past Medical History:   Diagnosis Date     Breast cancer (H) 2014    lumpectomy on left with radiation      Hypertension      Patient Active Problem List   Diagnosis     Malignant neoplasm of left female breast, unspecified estrogen receptor status, unspecified site of breast (H)     Malignant neoplasm of lower-outer quadrant of female breast (H)     Vitamin D deficiency     Thyroid cyst     Restrictive lung disease     Prediabetes     Sicca syndrome (H)     Mixed hyperlipidemia       FAMILY HISTORY  Family History   Problem Relation Age of Onset     Cancer Father    Negative for neuropathy or aneurysm    SOCIAL HISTORY  Social History     Tobacco Use     Smoking status: Never Smoker     Smokeless tobacco: Never Used   Substance Use Topics     Alcohol use: No     Drug use: No       SYSTEMS REVIEW  Twelve-system ROS was done and other than the HPI this was negative.  "    MEDICATIONS  albuterol (PROAIR HFA/PROVENTIL HFA/VENTOLIN HFA) 108 (90 Base) MCG/ACT inhaler, Inhale 2 puffs into the lungs  benzonatate (TESSALON) 200 MG capsule,   fluticasone (FLOVENT HFA) 44 MCG/ACT inhaler, INHALE 2 PUFFS BY MOUTH TWICE DAILY  vitamin D2 (ERGOCALCIFEROL) 29321 units (1250 mcg) capsule, TK 1 C PO Q 7 DAYS    No current facility-administered medications on file prior to visit.        PHYSICAL EXAMINATION  VITALS: /75 (BP Location: Right arm, Patient Position: Sitting)   Pulse 56   Ht 1.651 m (5' 5\")   Wt 88.9 kg (196 lb)   BMI 32.62 kg/m    GENERAL: Healthy appearing, alert, no acute distress, normal habitus.  CARDIOVASCULAR: Extremities warm and well-perfused.  NEUROLOGICAL:  Patient is awake and oriented to self, place and time.  Attention span is normal.  Memory is grossly intact.  Language is fluent and follows commands appropriately.  Appropriate fund of knowledge. Cranial nerves 2-12 are intact. There is no pronator drift.  Motor exam shows 5/5 strength in all extremities.  Tone is symmetric bilaterally in upper and lower extremities.  (Previously reflexes are symmetric and 2+ in upper extremities and lower extremities. Sensory exam is grossly intact to light touch, pin prick and vibration. ) Finger to nose and heel to shin is without dysmetria.  Romberg is negative.  Gait is normal and the patient is able to do tandem walk and walk on toes and heels.     DIAGNOSTICS  MRI  HEAD MRI:   1.  Stable exam.  2.  No mass, hemorrhage or stroke.  3.  Stable mild/moderate burden of nonspecific white matter T2   prolongation. The appearance is not consistent with demyelination.   Findings may be secondary to chronic small vessel ischemic change.     HEAD MRA:   1.  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 No further follow-up of this finding is recommended.  2.  No intradural aneurysm.  3.  No high-grade stenosis.    OUTSIDE RECORDS  Outside " referral notes were reviewed and pertinent information has been summarized;-patient was referred on October 14, 2020.  Was seen by Dr. Naqvi.  Patient was having joint pain.  Had elevated ESR and CRP.  Was also having some paresthesias in the forearm and fingertips.  This is more at night.  She negative Tinel's and Phalen's.  She is also been referred to neurology in the past but she did not pursue that.  Was seen by Dr. Mcclain in the past.  This was in July 2017.  Patient at the time was complaining of some back pain.  Patient did have an MRI which did show an aneurysm.  Patient was recommended to have a repeat scan in 5 years.  She was told to follow-up with her primary care doctor for her chronic back pain.    EMG     CLINICAL INTERPRETATION:  This is an abnormal nerve conduction and EMG study.  The needle study shows chronic left ulnar denervation but no active denervation findings.  Further clinical correlation is needed.     MRI C spine - images reviewed.  IMPRESSION:   1.  Minimal cervical spondylosis without spinal canal or foraminal stenosis. No spinal cord signal abnormality.    MRA - images personally reviewed  IMPRESSION:   1.  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   2.  Unchanged extradural 1-2 mm proximal cavernous left internal carotid artery aneurysm.  3.  Focal 2 mm outpouching arising medially from the right A1-2 junction likely represents the normal anterior commuting indicating artery rather than a true saccular aneurysm.    IMPRESSION/REPORT/PLAN  Hand numbness with neck pain  Cerebral aneurysm, nonruptured  Pressure in ear  History of hypertension  Hypertension with coughing    This is a 56 year old female with new onset of numbness in the hands.  MRI cervical spine was noncontributory.  EMG does show evidence of chronic left ulnar denervation though no active entrapment neuropathy.  Have improved on their own.  Would recommend that she follow through with  physical therapy to see how she does. We discussed gabapentin as a treatment option though she wants to hold off on medication and that would not cure her symptoms.    Her MRI did show a left MCA 3 mm aneurysm.  She also has extra dural cavernous left ICA aneurysm.  Discussed the significance of these.  Since he is a stable we will hold off on surgical options.  She would need to get another scan every year.  Further discussed her risk factors.  She does not smoke.  There is no family history of aneurysms.  She does have a history of hypertension which could be a risk factor here.  Encourage her to work with the primary care doctor get the hypertension under good control.    She has been having coughing with lisinopril.  Discussed blood pressure goal would be less than 120.  We will switch her to losartan to see if that helps.  She does need to stay establish with her primary care doctor for further management of blood pressure.    In terms of her ear pressure I put in a referral for ENT.  She does not have a headache and this is not a head pressure.    Follow back in 3 months.    -     PHYSICAL THERAPY REFERRAL; Future  -     OTOLARYNGOLOGY REFERRAL; Future  -     losartan (COZAAR) 25 MG tablet; Take 1 tablet (25 mg) by mouth daily    Return in about 3 months (around 9/16/2021) for In-Clinic Visit.    Over 30 minutes were spent coordinating the care for the patient on the day of the encounter.  This includes previsit, during visit and post visit activities as documented above.  (Activities include but not inclusive of reviewing chart, reviewing outside records, reviewing labs and imaging study results as well as the images, patient visit time including getting history and exam,  use if applicable, review of test results with the patient and coming up with a plan in a shared model, counseling patient and family, education and answering patient questions, EMR , EMR diagnosis entry and problem  list management, medication reconciliation and prescription management if applicable, paperwork if applicable, printing documents and documentation of the visit activities.)    Malik Del Cid MD  Neurologist  [facility]  Tel:- [phone]    This note was dictated using voice recognition software.  Any grammatical or context distortions are unintentional and inherent to the software.

## 2021-06-17 NOTE — TELEPHONE ENCOUNTER
Refill Approved    Rx renewed per Medication Renewal Policy. Medication was last renewed on 5/1/20, last OV 3/8/21.    Mihaela Rodriguez, [facility] Triage/Med Refill 5/9/2021     Requested Prescriptions   Pending Prescriptions Disp Refills     FLOVENT HFA 44 mcg/actuation inhaler [Pharmacy Med Name: FLOVENT HFA 44MCG ORAL INH 120INH] 1 Inhaler 0     Sig: INHALE 2 PUFFS BY MOUTH TWICE DAILY       There is no refill protocol information for this order

## 2021-06-17 NOTE — PATIENT INSTRUCTIONS - HE
Patient Instructions by Bernadine Shanks PT at 9/10/2019 11:30 AM     Author: Bernadine hSanks PT Service: -- Author Type: Physical Therapist    Filed: 9/10/2019 11:42 AM Encounter Date: 9/10/2019 Status: Addendum    : Bernadine Shanks PT (Physical Therapist)    Related Notes: Original Note by Bernadine Shanks PT (Physical Therapist) filed at 9/10/2019 11:25 AM             SIDELYING EXTERNAL ROTATION WITH TOWEL    Lie on your side with your elbow bent to 90 degrees. Place a rolled up towel between your arm and the side your body as shown.     Squeeze your shoulder blade back and down toward your buttocks and hold that position.     Next, roll your arm upwards from your stomach area towards the ceiling while maintaining your arm against the towel and with your shoulder blade held down and back the entire time. Lower your arm and repeat.      Can get Kinesiotape at [facility] pharmacy area  KT Tape  May be at other pharmacies also

## 2021-06-17 NOTE — PROGRESS NOTES
"Assessment:     1. Cough  benzonatate (TESSALON) 200 MG capsule       Plan:     1. Cough  She will take the following medications and hydrate herself if she fails to respond with relief of cough patient will need to see primary care provider  - benzonatate (TESSALON) 200 MG capsule; Take 1 capsule (200 mg total) by mouth 3 (three) times a day as needed.  Dispense: 20 capsule; Refill: 0      Subjective:   Pleasant 56-year-old who is had an upper respiratory-like illness for a week without any fever but is mainly bothered now by a cough.  Her Covid immunizations have been completed.  She has no diarrhea or vomiting and clinically on the phone here with a telephone call she does not appear to be clinically ill.  We have discussed options I will provide a cough medication for her and if she fails to respond she will come in for a face-to-face visit this was an 11-minute telephone call    Review of Systems: A complete 14 point review of systems was obtained and is negative or as stated in the history of present illness.    Past Medical History:   Diagnosis Date     Breast cancer (H)     s/p resection and radiation     DCIS (ductal carcinoma in situ) of breast     ER-OK-     GERD (gastroesophageal reflux disease)      Hx of radiation therapy      Vitamin D deficiency      Family History   Problem Relation Age of Onset     Throat cancer Father          \"I think age 82.\"     Hypertension Mother      BRCA 1/2 Neg Hx      Past Surgical History:   Procedure Laterality Date     BREAST BIOPSY Left      BREAST LUMPECTOMY Left      Social History     Tobacco Use     Smoking status: Never Smoker     Smokeless tobacco: Never Used   Substance Use Topics     Alcohol use: No     Drug use: No         Objective:   LMP 2014     This was a telephone visit occupying 11 minutes    This note has been dictated using voice recognition software. Any grammatical or context distortions are unintentional and inherent " to the the software.

## 2021-06-18 NOTE — PROGRESS NOTES
"Roverto Gonzalez who presents today with a chief complaint of  Consult, joint pains      Joint Pains: Yes  Location: \"all over\", mainly: left elbow, left hand  Onset: 3 months  Intensity: 6 /10  AM Stiffness: few Minutes  Alleviating/Aggravating Factors:aleve 2 tab two times a day, some relief. Not tried tylenol.  Tolerating Meds: yes  Other:      ROS:  Patient denies having any chest pain, shortness of breath, +cough (productive, comes and goes lately present since September 2017), no: abdominal pain, nausea, vomiting, rashes, fevers, oral ulcers and recent infections.  Patient admits to having a good appetite.  Denies has dry eyes, + dry mouth, no:  patchy alopecia, photosensitivity, raynaud's,  history or family history of psoriasis, history of inflammatory bowel disease, history of inflammatory eye disease (uveitis, iritis, etc), history of peptic ulcer disease, history of seizures, anxiety, depression, +unrefreshing/nonrestorative sleep.    Denies regular alcohol beverage intake and smoking history.        Problem List:  Patient Active Problem List   Diagnosis     DCIS (ductal carcinoma in situ)     Malignant neoplasm of lower-outer quadrant of female breast     GERD (gastroesophageal reflux disease) - awareness of throat - see note of 6/10/15     Vitamin D deficiency     Hypovitaminosis D  (< 20 in December, 2014)     Obesity (BMI 30.0-34.9)     Thyroid cyst     Helicobacter pylori (H. pylori)     Asthma     Dyspnea     Sicca syndrome     Prediabetes     Mixed hyperlipidemia        PMH:   Past Medical History:   Diagnosis Date     Breast cancer 2014    s/p resection and radiation     DCIS (ductal carcinoma in situ) of breast 2014    ER-MT-     GERD (gastroesophageal reflux disease)      Hx of radiation therapy 2014     Vitamin D deficiency        Surgical History:  Past Surgical History:   Procedure Laterality Date     BREAST BIOPSY Left 2014     BREAST LUMPECTOMY Left 6/14       Family History:  Family History " "  Problem Relation Age of Onset     Throat cancer Father       \"I think age 82.\"     Hypertension Mother      BRCA 1/2 Neg Hx        Social History:   reports that she has never smoked. She has never used smokeless tobacco. She reports that she does not drink alcohol or use illicit drugs.    Allergies:  No Known Allergies     Current Medications:  Current Outpatient Prescriptions   Medication Sig Dispense Refill     cholecalciferol, vitamin D3, 1,000 unit tablet Take 1,000 Units by mouth daily.       No current facility-administered medications for this visit.            Physical Exam:  /70  Pulse 72  Resp 8  Ht 5' 4.25\" (1.632 m)  Wt 197 lb (89.4 kg)  LMP 2014  SpO2 98%  Breastfeeding? No  BMI 33.55 kg/m2  General: A & O x 3 in NAD, overweight  HEENT: EOMI, Non injected/non icteric sclera, no oral lesions noted  CV: s1s2 with RRR, no rubs appreciated   Lungs: Positive rhonchi diffusely  GI: Soft, NT/ND, no rebound, no guarding noted  MS: Positive focal discomfort over left lateral epicondyle to palpation.  Has reproducible pain over left lateral epicondyle with left arm/left wrist extended and resistance applied.  Palpating left third digit MCP/PIP/DIP joints did not reproduce any pains, no synovitis noted.  Has some mild discomfort over left third digit A1 pulley region, no catching sensation noted in active flexion/extension of this digit.  8/18 myofascial tender points.  5/5 proximal upper/lower extremity weakness noted.  Otherwise patient demonstrated good passive/active ROM over other joints with no warmth, erythema, tenderness or synovitis noted over these joints.        Summary/Assessment:      53-year-old female presents with diffuse pains however primarily has been experiencing some discomfort involving left elbow and left third digit.    May have component of left lateral epicondylitis given physical exam findings.      Difficult to tell underlying etiology contributing to left " third digit discomfort, perhaps evolving trigger finger versus tendinitis involving the left elbow/distal upper extremity/third digit.    Patient also concerned about chronic cough going on for years however lately since September 2017 has been persistent.  Denies tobacco history.  On exam noted to have diffuse rhonchi.  Noted to have positive Jamila 1 antibody back in 2016.  No proximal upper/lower extremity weakness noted.  Denies any shortness of breath.    Regarding generalized diffuse pains, and have a myofascial pain syndrome contributing given chronic unrefreshing sleep, chronic fatigue and some myofascial tender points noted on exam.    Has history of vitamin D deficiency, claims has not been taking any vitamin D daily and would like to have level rechecked.    Adds expenses diffuse pruritus onset about a year ago however denies any rashes.     Please see below for management plan.        Pertinent rheumatology/past medical history (please refer to above for more detailed history):      Positive Jamila 1 antibody    Chronic cough/diffuse rhonchi    Left lateral epicondylitis    Left third digit discomfort    Left knee osteoarthritis    Myofascial pain/nonrestorative sleep    Dry mouth    Generalized pruritus    Overweight    Vitamin D deficiency        Rheumatology medications provided/suggested:    Tylenol  Voltaren gel      Pertinent medication from other providers or from otc (please refer to above for more detailed med list):    Aleve      Pertinent medications already tried:           Pertinent lab history:    Positive Jamila 1 antibody    Noted to have negative/unremarkable: Rheumatoid factor, CCP antibody, other ALEYDA related subsets.      Pertinent imaging/test history:    X-rays of left knee from July 2013 show some signs of DJD.    Chest x-ray from June 2016 was unremarkable.    Other:        Plan:      We will provide Voltaren gel to use over left lateral epicondyle region and left third digit A1 pulley region,  where she experiences her pains.    Suggested taking Tylenol 500-1000 mg as needed for arthralgias/myalgias.    We will x-ray left elbow and left hand.    We will provide left armband to be used with activities that reproduce left elbow pains.      We will refer to pulmonary for chronic cough/rhonchi noted along with Jamila 1 antibody.    We will obtain some labs today and correlate clinically.    Follow-up in 6 weeks.      Procedure note:            Spent 50 minutes with greater than half of this time spent with the patient going over differential diagnosis, prognosis, treatment plan, medication side effects and  answering questions.    The patient is new to me however, is a follow-up to our rheumatology clinic.      This note was transcribed using Dragon voice recognition software as a result unintentional grammatical errors or word substitutions may have occurred. Please contact our Rheumatology department if you need any clarification or if you have any related inquiries.    Major side effect profile of medications provided were discussed with the patient.      Joes Naqvi ....................  5/22/2018   1:26 PM

## 2021-06-18 NOTE — PROGRESS NOTES
"Roverto Gonzalez who presents today with a chief complaint of  Follow-up,  joint pains      Joint Pains:  yes  Location: left arm \"doing better\"  Onset: chronic  Intensity:  0/10  AM Stiffness: few  Minutes  Alleviating/Aggravating Factors:  Tolerating Meds: yes  Other: Rash x few wks, no: new meds, new detergents, travel outside country.      ROS:  Patient denies having any chest pain, shortness of breath, cough, abdominal pain, nausea, vomiting, + pruritic rashes, no: fevers, oral ulcers and recent infections.  Patient admits to having a good appetite      Problem List:  Patient Active Problem List   Diagnosis     DCIS (ductal carcinoma in situ)     Malignant neoplasm of lower-outer quadrant of female breast (H)     GERD (gastroesophageal reflux disease) - awareness of throat - see note of 6/10/15     Vitamin D deficiency     Hypovitaminosis D  (< 20 in )     Obesity (BMI 30.0-34.9)     Thyroid cyst     Helicobacter pylori (H. pylori)     Asthma     Dyspnea     Sicca syndrome (H)     Prediabetes     Mixed hyperlipidemia        PMH:   Past Medical History:   Diagnosis Date     Breast cancer (H) 2014    s/p resection and radiation     DCIS (ductal carcinoma in situ) of breast     ER-LA-     GERD (gastroesophageal reflux disease)      Hx of radiation therapy 2014     Vitamin D deficiency        Surgical History:  Past Surgical History:   Procedure Laterality Date     BREAST BIOPSY Left      BREAST LUMPECTOMY Left        Family History:  Family History   Problem Relation Age of Onset     Throat cancer Father       \"I think age 82.\"     Hypertension Mother      BRCA 1/2 Neg Hx        Social History:   reports that she has never smoked. She has never used smokeless tobacco. She reports that she does not drink alcohol or use illicit drugs.    Allergies:  No Known Allergies     Current Medications:  Current Outpatient Prescriptions   Medication Sig Dispense Refill     cholecalciferol, vitamin " "D3, 1,000 unit tablet Take 1,000 Units by mouth daily.       diclofenac sodium (VOLTAREN) 1 % Gel Apply approximately 1/2-1 gm over affected left hand and/or left elbow joints three times a day, as needed. 1 Tube 1     ergocalciferol (VITAMIN D2) 50,000 unit capsule Take 1 capsule (50,000 Units total) by mouth every 7 days. 12 capsule 0     No current facility-administered medications for this visit.            Physical Exam:  /82  Pulse 73  Resp 8  Ht 5' 4.25\" (1.632 m)  Wt 197 lb (89.4 kg)  LMP 02/02/2014  Breastfeeding? No  BMI 33.55 kg/m2  General: A & O x 3 in NAD  HEENT: EOMI, Non injected/non icteric sclera, no oral lesions noted  Dermatology: A few scattered raised mildly erythematous skin lesions noted involving distal lower extremities bilaterally arms and chest.  No dilated nailbed capillary loops noted, no Gottron pupils appreciated, no shawl sign.  CV: s1s2 with RRR, no rubs appreciated   Lungs: Positive diffuse rhonchi with some wheezing otherwise CTA B/L, no: rales or rhonci appreciated.  GI: Soft, NT/ND, no rebound, no guarding noted  MS: Mild discomfort on palpating left lateral epicondyle.  No reproducible pain on palpating left third digit, no synovitis noted.  Demonstrated 5/5 proximal upper/lower extremity strength bilaterally.  Otherwise patient demonstrated good passive/active ROM over other joints with no warmth, erythema, tenderness or synovitis noted over these joints.      Summary/Assessment:    History that includes positive Jamila 1 antibody pulmonary rhonchi, left lateral epicondylitis, osteoarthritis, myofascial pains.    Presents for follow-up visit and claims that her left lateral epicondylitis pains have been doing better and no longer has left third digit pain.    However has been bothered over the past few weeks by few scattered pruritic skin lesions involving upper/lower extremities and chest.  States had similar lesions in the past which was self-limiting after 2 weeks " however current lesions have been persistent.      Patient does not recall any possible triggers only admits to modifying her diet by adding mushrooms to health drinks.    Patient did not follow through with referral to pulmonary for positive Jamila 1 antibody with history of coughing and rhonchi/wheezing on exam.     Denies having any shortness of breath, lately denies having cough as well.  Admits to wheezing.    No proximal upper/lower extremity weakness noted.  .    Is currently in the midst of taking weekly vitamin D supplements.    On prior visit patient mentioned experiencing diffuse pruritus, onset about a year ago, denied any rashes.  However, currently has active skin lesions.    Please see below for management plan.      Pertinent rheumatology/past medical history (please refer to above for more detailed history):      Positive Jamila 1 antibody    Chronic cough/diffuse rhonchi/wheezing    Left lateral epicondylitis    Hx of left third digit discomfort     Left knee osteoarthritis    Myofascial pain/nonrestorative sleep    Dry mouth (negative Sjogren antibodies)     Generalized pruritus (without rash onset > 1 year)    Pruritic rash (6/2018)    Overweight    Vitamin D deficiency    Elevated ALT (mild)    Neutropenia (mild)    Elevated sed rate/CRP          Rheumatology medications provided/suggested:    Tylenol  Voltaren gel      Pertinent medication from other providers or from otc (please refer to above for more detailed med list):    Aleve      Pertinent medications already tried:           Pertinent lab history:    Positive Jamila 1 antibody    Noted to have negative/unremarkable: Rheumatoid factor, CCP antibody, other ALEYDA related subsets.      Pertinent imaging/test history:    X-rays of left knee from July 2013 show some signs of DJD.    Chest x-ray from June 2016 was unremarkable.    From May 2018:  x-rays of left elbow and left hand were unremarkable.    Other:        Plan:      We will refer patient to  dermatology to consider biopsying of skin lesions and assist with management.  Suggested she avoid using topical steroids which may taint biopsy result.    For itching suggested she take Benadryl 25 mg every 6 hours as needed made aware that can be sedating and therefore if sedating she should avoid driving when using.    If necessary, continue Voltaren gel as needed over left lateral epicondyle region and left third digit A1 pulley region.    If necessary, continue taking Tylenol 500-1000 mg as needed for arthralgias/myalgias.    We will x-ray left elbow and left hand.    If necessary, has left armband to be used with activities that reproduce left elbow pains.      Suggest she follow through with referral to pulmonary for cough history, rhonchi/wheezing noted along with Jamila 1 antibody.    Made aware that once she completes course of weekly vitamin D supplements that she should not take over-the-counter vitamin D 1000 units daily.    We will obtain some labs and correlate clinically.    Follow-up in 2 months.      Procedure note:            Spent 45 minutes with greater than half of this time spent with the patient going over differential diagnosis, prognosis, treatment plan, medication side effects and  answering questions.    Patient accompanied by friend to this visit.    This note was transcribed using Dragon voice recognition software as a result unintentional grammatical errors or word substitutions may have occurred. Please contact our Rheumatology department if you need any clarification or if you have any related inquiries.    Major side effect profile of medications provided were discussed with the patient.      Jose Naqvi ....................  6/26/2018   4:56 PM

## 2021-06-19 NOTE — LETTER
Letter by Anisha Ramirez CNP at      Author: Anisha Ramirez CNP Service: -- Author Type: --    Filed:  Encounter Date: 3/31/2019 Status: (Other)         Roverto Gonzalez  [address]  [place] [address]             March 31, 2019         Dear Ms. Carlos,    Below are the results from your recent visit:    Resulted Orders   Lipid Cascade   Result Value Ref Range    Cholesterol 217 (H) <=199 mg/dL    Triglycerides 109 <=149 mg/dL    HDL Cholesterol 59 >=50 mg/dL    LDL Calculated 136 (H) <=129 mg/dL    Patient Fasting > 8hrs? Yes    Glycosylated Hemoglobin A1c   Result Value Ref Range    Hemoglobin A1c 5.8 3.5 - 6.0 %   Comprehensive Metabolic Panel   Result Value Ref Range    Sodium 142 136 - 145 mmol/L    Potassium 4.1 3.5 - 5.0 mmol/L    Chloride 108 (H) 98 - 107 mmol/L    CO2 25 22 - 31 mmol/L    Anion Gap, Calculation 9 5 - 18 mmol/L    Glucose 102 70 - 125 mg/dL    BUN 14 8 - 22 mg/dL    Creatinine 0.67 0.60 - 1.10 mg/dL    GFR MDRD Af Amer >60 >60 mL/min/1.73m2    GFR MDRD Non Af Amer >60 >60 mL/min/1.73m2    Bilirubin, Total 0.4 0.0 - 1.0 mg/dL    Calcium 9.1 8.5 - 10.5 mg/dL    Protein, Total 7.5 6.0 - 8.0 g/dL    Albumin 3.8 3.5 - 5.0 g/dL    Alkaline Phosphatase 85 45 - 120 U/L    AST 28 0 - 40 U/L    ALT 35 0 - 45 U/L    Narrative    Fasting Glucose reference range is 70-99 mg/dL per  American Diabetes Association (ADA) guidelines.   Vitamin D, Total (25-Hydroxy)   Result Value Ref Range    Vitamin D, Total (25-Hydroxy) 19.4 (L) 30.0 - 80.0 ng/mL    Narrative    Deficiency <10.0 ng/mL  Insufficiency 10.0-29.9 ng/mL  Sufficiency 30.0-80.0 ng/mL  Toxicity (possible) >100.0 ng/mL   Thyroid Cascade   Result Value Ref Range    TSH 2.46 0.30 - 5.00 uIU/mL   HM2(CBC w/o Differential)   Result Value Ref Range    WBC 4.8 4.0 - 11.0 thou/uL    RBC 4.47 3.80 - 5.40 mill/uL    Hemoglobin 12.5 12.0 - 16.0 g/dL    Hematocrit 37.9 35.0 - 47.0 %    MCV 85 80 - 100 fL    MCH 27.9 27.0 - 34.0 pg    MCHC 33.0 32.0 -  36.0 g/dL    RDW 13.0 11.0 - 14.5 %    Platelets 167 140 - 440 thou/uL    MPV 8.8 7.0 - 10.0 fL   Sedimentation Rate   Result Value Ref Range    Sed Rate 44 (H) 0 - 20 mm/hr       Your sed rate (inflammatory marker) remains mildly elevated.  I recommend close follow-up with the rheumatologist.     Your Vitamin D is low.  This can cause fatigue and joint pain.  I sent a prescription to the pharmacy for Vitamin D 75983 units twice weekly for 12 weeks.  I recommend a lab recheck then.  Once you complete the high dose, I recommend that you take 2000 units of Vitamin D3 daily which you can buy over-the-counter.     Your cholesterol is elevated.  I recommend you consume a healthy low-fat diet (avoid fast food, fried foods, processed foods, and butter) and exercising.  You can also consider over-the-counter fish oil or CoQ10 to assist with this.        You can lower your cholesterol some if you avoid red meat, butter, fried foods, cheese, and other foods that have a lot of saturated fat. Other things that might help lower cholesterol include:  ?Eating more soluble fiber - Soluble fiber is found in fruits, oats, barley, beans, and peas.  ?A vegan diet - A vegan diet contains no animal products, such as meat, eggs, or milk.  But if you are interested in improving your health, it's best not to focus just on cholesterol. There are changes you can make to your diet that will reduce your risk of heart disease and other problems--even if they don't lower your cholesterol much.  No single diet is right for everyone. But in general, a healthy diet can include:  ?Lots of fruits, vegetables, and whole grains (examples of whole grains include whole wheat, oats, and barley)  ?Some beans, peas, lentils, chickpeas, and similar foods  ?Some nuts, such as walnuts, almonds, and peanuts  ?Some milk and milk products  ?Some fish  To have a healthy diet, it's also important to limit or avoid sugar, sweets, and refined grains. (Refined grains  "are found in white bread, white rice, most forms of pasta, and most packaged \"snack\" foods.)          Please call with questions or contact us using [facility].    Sincerely,        Electronically signed by Anisha Ramirez CNP       "

## 2021-06-19 NOTE — LETTER
Letter by Jose Naqvi DO at      Author: Jose Naqvi DO Service: -- Author Type: --    Filed:  Encounter Date: 9/19/2019 Status: (Other)         Roverto Gonzalez  [address]  [facility] [address]             September 19, 2019         Dear Ms. Gonzalez,    Below are the results from your recent visit:  Resulted Orders   CK Total   Result Value Ref Range    CK, Total 132 30 - 190 U/L   Myoglobin, Serum   Result Value Ref Range    Myoglobin 29 <=90 mcg/L      Comment:         Test Performed by:  [facility] - [address] [address]  : Ivan Craig M.D. Ph.D.; CLIA# [id number]   Erythrocyte Sedimentation Rate   Result Value Ref Range    Sed Rate 50 (H) 0 - 20 mm/hr   C-Reactive Protein   Result Value Ref Range    CRP 1.8 (H) 0.0 - 0.8 mg/dL            Normal CK and myoglobin muscle markers.     ESR and CRP levels are nonspecific inflammatory markers, they returned elevated (not new), these levels need to be correlated clinically.  Recheck ESR and CRP levels a few days prior to next visit.      Sincerely,        Electronically signed by Jose Naqvi DO

## 2021-06-21 NOTE — LETTER
Letter by Anisha Ramirez CNP at      Author: Anisha Ramirez CNP Service: -- Author Type: --    Filed:  Encounter Date: 11/2/2020 Status: (Other)         Roverto EPHRAIM Gonzalez  [address]  [facility] [address]             November 2, 2020         Dear Ms. Gonzalez,    We are happy to inform you that your recent Pap smear and Human Papillomavirus (HPV) test results are normal and negative.    It is recommended that you have your next Pap smear and Human Papillomavirus (HPV) test in 5 years. You will also need to return to the clinic every year for an annual wellness visit.    If you have additional questions regarding this result, please contact our office and we will be happy to assist you.      Sincerely,    Your [facility] Care Team

## 2021-06-25 NOTE — TELEPHONE ENCOUNTER
Insurance only covers 60 miles, patient has an appointment at the [facility] and that is 90 miles needs an override form.     Name of form/paperwork: Other:  3060 form  Have you been seen for this request: N/A  Do we have the form: Yes- faxing to site today  When is form needed by: asap   How would you like the form returned: faxed back to [employer]  Fax Number: fax on the form   Patient Notified form requests are processed in 3-5 business days: Yes  (If patient needs form sooner, please note that in this message.)  Okay to leave a detailed message? Yes

## 2021-06-25 NOTE — TELEPHONE ENCOUNTER
Who is calling:   Calling back   Reason for Call:   To make sure the form was received  It is very important to have on the form:  1. Correct diagnosis code for the proper mammogram, why she needs to have at the  [facility].  And explicit instructions or details as to why she needs to be seen at [facility]  Date of last appointment with primary care:  2/16/2018  Okay to leave a detailed message: Yes

## 2021-06-27 NOTE — PROGRESS NOTES
"Progress Notes by Kindra Gardner AuD at 4/1/2019  1:30 PM     Author: Kindra Gardner AuD Service: -- Author Type: Audiologist    Filed: 4/1/2019  5:43 PM Encounter Date: 4/1/2019 Status: Signed    : Kindra Gardner AuD (Audiologist)       Audiology Report    Referring Provider:  Dr. Elaine    Summary: Audiology visit completed. Please see audiogram below or in \"media\" tab for case history and results.      Transducer: Insert earphones and Circumaural headphones    Reliability was good  and there was good  SRT to PTA agreement.       Plan:  The patient is returned to ENT for follow up.  She should return for retesting with ENT recommendation.      Chelita Poole, [facility]  [place] Licensed Audiologist #[id number]                        "

## 2021-06-28 ENCOUNTER — TELEPHONE (OUTPATIENT)
Dept: NEUROLOGY | Facility: CLINIC | Age: 56
End: 2021-06-28

## 2021-06-28 NOTE — TELEPHONE ENCOUNTER
Spoke with patient. Informed ok to cut tablet in half. Pt is requesting for Losartan 12.5 mg, informed pt 25 is the lowest dosage. Pt has been cutting tablets in half, although it has not been perfectly even. I mentioned to pt that she is able to purchase a pill splitter to help with cutting the tabs more evenly. Pt understands.   Eagle Hutson MA on 6/28/2021 at 3:17 PM

## 2021-06-28 NOTE — TELEPHONE ENCOUNTER
Pt lm asking to decrease the Losartan by half. She is currently on 25 mg. She is having dizziness. [phone].

## 2021-06-28 NOTE — TELEPHONE ENCOUNTER
Dr. Del Cid, ok to cut in half? Otherwise please advise.   Eagle Hutson MA on 6/28/2021 at 2:34 PM

## 2021-07-03 NOTE — ADDENDUM NOTE
Addendum Note by Anisha Ramirez CNP at 2/16/2018  9:02 AM     Author: Anisha Ramirez CNP Service: -- Author Type: Nurse Practitioner    Filed: 2/16/2018  9:02 AM Encounter Date: 2/16/2018 Status: Signed    : Anisha Ramirez CNP (Nurse Practitioner)    Addended by: ANISHA RAMIREZ on: 2/16/2018 09:02 AM        Modules accepted: Orders

## 2021-07-31 DIAGNOSIS — J98.4 RESTRICTIVE LUNG DISEASE: Primary | ICD-10-CM

## 2021-08-03 PROBLEM — D70.9 NEUTROPENIA, UNSPECIFIED TYPE (H): Status: RESOLVED | Noted: 2019-03-25 | Resolved: 2021-03-08

## 2021-08-04 RX ORDER — ALBUTEROL SULFATE 90 UG/1
AEROSOL, METERED RESPIRATORY (INHALATION)
Qty: 18 G | Refills: 8 | Status: SHIPPED | OUTPATIENT
Start: 2021-08-04 | End: 2023-02-10

## 2021-09-02 DIAGNOSIS — I10 HYPERTENSION, UNSPECIFIED TYPE: ICD-10-CM

## 2021-09-03 RX ORDER — LOSARTAN POTASSIUM 25 MG/1
TABLET ORAL
Qty: 90 TABLET | Refills: 0 | Status: SHIPPED | OUTPATIENT
Start: 2021-09-03 | End: 2021-12-16

## 2021-09-03 NOTE — TELEPHONE ENCOUNTER
Refill request on losartan 25 mg   Medication T'd for review and signature  Eagle Hutson MA on 9/3/2021 at 8:02 AM

## 2021-10-10 ENCOUNTER — HEALTH MAINTENANCE LETTER (OUTPATIENT)
Age: 56
End: 2021-10-10

## 2021-12-04 ENCOUNTER — HEALTH MAINTENANCE LETTER (OUTPATIENT)
Age: 56
End: 2021-12-04

## 2021-12-15 DIAGNOSIS — I10 HYPERTENSION, UNSPECIFIED TYPE: ICD-10-CM

## 2021-12-16 RX ORDER — LOSARTAN POTASSIUM 25 MG/1
TABLET ORAL
Qty: 30 TABLET | Refills: 0 | Status: SHIPPED | OUTPATIENT
Start: 2021-12-16 | End: 2022-01-17

## 2021-12-16 NOTE — TELEPHONE ENCOUNTER
Medication refill request for losartan (COZAAR) 25 MG tablet    Patient was last seen on June 16, 2021. Pt is due for a follow up appt and has not scheduled one yet. I will send a message to the pt via [employer] to remind to call clinic for appt.      Medication T'd for review and signature    MOHAN Bean on 12/16/2021 at 11:19 AM

## 2022-01-17 DIAGNOSIS — I10 HYPERTENSION, UNSPECIFIED TYPE: ICD-10-CM

## 2022-01-17 RX ORDER — LOSARTAN POTASSIUM 25 MG/1
25 TABLET ORAL DAILY
Qty: 30 TABLET | Refills: 0 | Status: SHIPPED | OUTPATIENT
Start: 2022-01-17 | End: 2022-02-15

## 2022-01-17 NOTE — LETTER
1/17/2022        RE: Roverto YE Carlos  [address]  [facility] [address]            Dear Roverto,       We recently provided you with medication refills.  Many medications require routine follow-up with your doctor.    Your prescription(s) have been refilled for 30 days so you may have time for the above noted follow-up. Please call to schedule soon so we can assure you have an appointment before your next refills are needed. If you have already made a follow up appointment, please disregard this letter.         Sincerely,        [facility]     (Formerly known as [facility])

## 2022-01-17 NOTE — TELEPHONE ENCOUNTER
Refill request for Losartan. Pt last seen 6/16/21 and was due back around 9/2021. Pt does not have follow up scheduled yet. Will send letter regarding this need. Will also send in 30 day supply with no refills.     Sarah Vivas RN on 1/17/2022 at 3:50 PM

## 2022-02-15 ENCOUNTER — TELEPHONE (OUTPATIENT)
Dept: NEUROLOGY | Facility: CLINIC | Age: 57
End: 2022-02-15
Payer: COMMERCIAL

## 2022-02-15 DIAGNOSIS — I10 HYPERTENSION, UNSPECIFIED TYPE: ICD-10-CM

## 2022-02-15 PROBLEM — K21.9 GERD (GASTROESOPHAGEAL REFLUX DISEASE): Status: ACTIVE | Noted: 2022-02-15

## 2022-02-15 PROBLEM — I67.1 CEREBRAL ARTERIAL ANEURYSM: Status: ACTIVE | Noted: 2022-02-15

## 2022-02-15 RX ORDER — LOSARTAN POTASSIUM 25 MG/1
25 TABLET ORAL DAILY
Qty: 90 TABLET | Refills: 0 | Status: SHIPPED | OUTPATIENT
Start: 2022-02-15 | End: 2022-05-12

## 2022-02-15 NOTE — TELEPHONE ENCOUNTER
Refill request for Losartan. Pt last seen 6/16/21 and is over due for follow up. Pt has been contacted many times regarding this. Pt reports she is going to [facility] for 3 months and needs her medication. Will send in refill, but pt needs to schedule follow up.     Sarah Vivas RN on 2/15/2022 at 11:06 AM

## 2022-02-15 NOTE — TELEPHONE ENCOUNTER
Patient says she is going out of town tomorrow and needs a 3 month refill so she can pick it up today.  LOSARTAN 25mg tablets 1x per day wants 90 days  eri [address] [facility] [phone]  Script # [phone]  Please call patient to let her know the script has been sent, she wants to  Make sure she can get it today since she is going out of town tomorrow.   [phone]

## 2022-02-16 ENCOUNTER — OFFICE VISIT (OUTPATIENT)
Dept: FAMILY MEDICINE | Facility: CLINIC | Age: 57
End: 2022-02-16
Payer: COMMERCIAL

## 2022-02-16 VITALS
DIASTOLIC BLOOD PRESSURE: 60 MMHG | WEIGHT: 195.9 LBS | HEART RATE: 53 BPM | BODY MASS INDEX: 33.44 KG/M2 | SYSTOLIC BLOOD PRESSURE: 126 MMHG | TEMPERATURE: 97.7 F | HEIGHT: 64 IN | OXYGEN SATURATION: 98 %

## 2022-02-16 DIAGNOSIS — J45.40 MODERATE PERSISTENT ASTHMA WITHOUT COMPLICATION: ICD-10-CM

## 2022-02-16 DIAGNOSIS — R73.03 PREDIABETES: ICD-10-CM

## 2022-02-16 DIAGNOSIS — E55.9 VITAMIN D DEFICIENCY: ICD-10-CM

## 2022-02-16 DIAGNOSIS — Z12.31 ENCOUNTER FOR SCREENING MAMMOGRAM FOR BREAST CANCER: ICD-10-CM

## 2022-02-16 DIAGNOSIS — I10 BENIGN ESSENTIAL HYPERTENSION: ICD-10-CM

## 2022-02-16 DIAGNOSIS — M25.50 CHRONIC JOINT PAIN: ICD-10-CM

## 2022-02-16 DIAGNOSIS — J98.4 RESTRICTIVE LUNG DISEASE: ICD-10-CM

## 2022-02-16 DIAGNOSIS — Z11.4 SCREENING FOR HIV (HUMAN IMMUNODEFICIENCY VIRUS): Primary | ICD-10-CM

## 2022-02-16 DIAGNOSIS — R20.0 HAND NUMBNESS: ICD-10-CM

## 2022-02-16 DIAGNOSIS — Z23 HIGH PRIORITY FOR 2019-NCOV VACCINE: ICD-10-CM

## 2022-02-16 DIAGNOSIS — G89.29 CHRONIC JOINT PAIN: ICD-10-CM

## 2022-02-16 DIAGNOSIS — I67.1 CEREBRAL ARTERIAL ANEURYSM: ICD-10-CM

## 2022-02-16 DIAGNOSIS — R82.998 DARK URINE: ICD-10-CM

## 2022-02-16 DIAGNOSIS — Z00.00 ANNUAL PHYSICAL EXAM: ICD-10-CM

## 2022-02-16 PROBLEM — K21.9 GERD (GASTROESOPHAGEAL REFLUX DISEASE): Status: RESOLVED | Noted: 2022-02-15 | Resolved: 2022-02-16

## 2022-02-16 LAB
ALBUMIN SERPL-MCNC: 4.1 G/DL (ref 3.5–5)
ALBUMIN UR-MCNC: NEGATIVE MG/DL
ALP SERPL-CCNC: 74 U/L (ref 45–120)
ALT SERPL W P-5'-P-CCNC: 19 U/L (ref 0–45)
ANION GAP SERPL CALCULATED.3IONS-SCNC: 9 MMOL/L (ref 5–18)
APPEARANCE UR: CLEAR
AST SERPL W P-5'-P-CCNC: 20 U/L (ref 0–40)
BACTERIA #/AREA URNS HPF: ABNORMAL /HPF
BILIRUB SERPL-MCNC: 0.5 MG/DL (ref 0–1)
BILIRUB UR QL STRIP: NEGATIVE
BUN SERPL-MCNC: 11 MG/DL (ref 8–22)
CALCIUM SERPL-MCNC: 9.6 MG/DL (ref 8.5–10.5)
CHLORIDE BLD-SCNC: 105 MMOL/L (ref 98–107)
CHOLEST SERPL-MCNC: 246 MG/DL
CO2 SERPL-SCNC: 27 MMOL/L (ref 22–31)
COLOR UR AUTO: YELLOW
CREAT SERPL-MCNC: 0.68 MG/DL (ref 0.6–1.1)
ERYTHROCYTE [DISTWIDTH] IN BLOOD BY AUTOMATED COUNT: 12.6 % (ref 10–15)
FASTING STATUS PATIENT QL REPORTED: ABNORMAL
GFR SERPL CREATININE-BSD FRML MDRD: >90 ML/MIN/1.73M2
GLUCOSE BLD-MCNC: 94 MG/DL (ref 70–125)
GLUCOSE UR STRIP-MCNC: NEGATIVE MG/DL
HBA1C MFR BLD: 5.8 % (ref 0–5.6)
HCT VFR BLD AUTO: 36.8 % (ref 35–47)
HDLC SERPL-MCNC: 64 MG/DL
HGB BLD-MCNC: 12.4 G/DL (ref 11.7–15.7)
HGB UR QL STRIP: NEGATIVE
HIV 1+2 AB+HIV1 P24 AG SERPL QL IA: NEGATIVE
KETONES UR STRIP-MCNC: NEGATIVE MG/DL
LDLC SERPL CALC-MCNC: 168 MG/DL
LEUKOCYTE ESTERASE UR QL STRIP: ABNORMAL
MCH RBC QN AUTO: 28.6 PG (ref 26.5–33)
MCHC RBC AUTO-ENTMCNC: 33.7 G/DL (ref 31.5–36.5)
MCV RBC AUTO: 85 FL (ref 78–100)
NITRATE UR QL: NEGATIVE
PH UR STRIP: 7 [PH] (ref 5–8)
PLATELET # BLD AUTO: 184 10E3/UL (ref 150–450)
POTASSIUM BLD-SCNC: 3.9 MMOL/L (ref 3.5–5)
PROT SERPL-MCNC: 7.8 G/DL (ref 6–8)
RBC # BLD AUTO: 4.33 10E6/UL (ref 3.8–5.2)
RBC #/AREA URNS AUTO: ABNORMAL /HPF
SODIUM SERPL-SCNC: 141 MMOL/L (ref 136–145)
SP GR UR STRIP: 1.02 (ref 1–1.03)
SQUAMOUS #/AREA URNS AUTO: ABNORMAL /LPF
TRIGL SERPL-MCNC: 71 MG/DL
UROBILINOGEN UR STRIP-ACNC: 0.2 E.U./DL
WBC # BLD AUTO: 4.6 10E3/UL (ref 4–11)
WBC #/AREA URNS AUTO: ABNORMAL /HPF

## 2022-02-16 PROCEDURE — 85027 COMPLETE CBC AUTOMATED: CPT | Performed by: STUDENT IN AN ORGANIZED HEALTH CARE EDUCATION/TRAINING PROGRAM

## 2022-02-16 PROCEDURE — 0064A COVID-19,PF,MODERNA (18+ YRS BOOSTER .25ML): CPT | Performed by: STUDENT IN AN ORGANIZED HEALTH CARE EDUCATION/TRAINING PROGRAM

## 2022-02-16 PROCEDURE — 36415 COLL VENOUS BLD VENIPUNCTURE: CPT | Performed by: STUDENT IN AN ORGANIZED HEALTH CARE EDUCATION/TRAINING PROGRAM

## 2022-02-16 PROCEDURE — 82306 VITAMIN D 25 HYDROXY: CPT | Performed by: STUDENT IN AN ORGANIZED HEALTH CARE EDUCATION/TRAINING PROGRAM

## 2022-02-16 PROCEDURE — 99396 PREV VISIT EST AGE 40-64: CPT | Mod: 25 | Performed by: STUDENT IN AN ORGANIZED HEALTH CARE EDUCATION/TRAINING PROGRAM

## 2022-02-16 PROCEDURE — 81001 URINALYSIS AUTO W/SCOPE: CPT | Performed by: STUDENT IN AN ORGANIZED HEALTH CARE EDUCATION/TRAINING PROGRAM

## 2022-02-16 PROCEDURE — 91306 COVID-19,PF,MODERNA (18+ YRS BOOSTER .25ML): CPT | Performed by: STUDENT IN AN ORGANIZED HEALTH CARE EDUCATION/TRAINING PROGRAM

## 2022-02-16 PROCEDURE — 80053 COMPREHEN METABOLIC PANEL: CPT | Performed by: STUDENT IN AN ORGANIZED HEALTH CARE EDUCATION/TRAINING PROGRAM

## 2022-02-16 PROCEDURE — 80061 LIPID PANEL: CPT | Performed by: STUDENT IN AN ORGANIZED HEALTH CARE EDUCATION/TRAINING PROGRAM

## 2022-02-16 PROCEDURE — 87389 HIV-1 AG W/HIV-1&-2 AB AG IA: CPT | Performed by: STUDENT IN AN ORGANIZED HEALTH CARE EDUCATION/TRAINING PROGRAM

## 2022-02-16 PROCEDURE — 83036 HEMOGLOBIN GLYCOSYLATED A1C: CPT | Performed by: STUDENT IN AN ORGANIZED HEALTH CARE EDUCATION/TRAINING PROGRAM

## 2022-02-16 PROCEDURE — 99214 OFFICE O/P EST MOD 30 MIN: CPT | Mod: 25 | Performed by: STUDENT IN AN ORGANIZED HEALTH CARE EDUCATION/TRAINING PROGRAM

## 2022-02-16 ASSESSMENT — ASTHMA QUESTIONNAIRES
QUESTION_5 LAST FOUR WEEKS HOW WOULD YOU RATE YOUR ASTHMA CONTROL: WELL CONTROLLED
ACUTE_EXACERBATION_TODAY: NO
QUESTION_2 LAST FOUR WEEKS HOW OFTEN HAVE YOU HAD SHORTNESS OF BREATH: THREE TO SIX TIMES A WEEK
QUESTION_4 LAST FOUR WEEKS HOW OFTEN HAVE YOU USED YOUR RESCUE INHALER OR NEBULIZER MEDICATION (SUCH AS ALBUTEROL): TWO OR THREE TIMES PER WEEK
QUESTION_1 LAST FOUR WEEKS HOW MUCH OF THE TIME DID YOUR ASTHMA KEEP YOU FROM GETTING AS MUCH DONE AT WORK, SCHOOL OR AT HOME: NONE OF THE TIME
QUESTION_3 LAST FOUR WEEKS HOW OFTEN DID YOUR ASTHMA SYMPTOMS (WHEEZING, COUGHING, SHORTNESS OF BREATH, CHEST TIGHTNESS OR PAIN) WAKE YOU UP AT NIGHT OR EARLIER THAN USUAL IN THE MORNING: NOT AT ALL
ACT_TOTALSCORE: 20
ACT_TOTALSCORE: 20

## 2022-02-16 NOTE — PROGRESS NOTES
Assessment/ Plan     57-year-old female with possible history of asthma/restrictive lung disease, prediabetes, benign essential hypertension, cerebral arterial aneurysm, breast cancer now in remission, obesity who presents for annual physical exam with a couple of concerns today as below.    1. Screening for HIV (human immunodeficiency virus)  - HIV Antigen Antibody Combo; Future    2. Cerebral arterial aneurysm  Follows with neurology for this.  Has also been seen for hand numbness through neurology.  Recommended physical therapy and watchful waiting of aneurysm as has not changed on most recent MRA.  She has not gone to physical therapy but will rerefer her today.    3. Annual physical exam  - REVIEW OF HEALTH MAINTENANCE PROTOCOL ORDERS  - Comprehensive metabolic panel (BMP + Alb, Alk Phos, ALT, AST, Total. Bili, TP); Future  - CBC with platelets; Future  - Lipid panel reflex to direct LDL Fasting; Future    4. Prediabetes  Last checked in 2020.  6.1 at that time will recheck today  - Hemoglobin A1c; Future    5. Vitamin D deficiency  Low in the past.  Takes vitamin D supplementation along with calcium.  Would like to be rechecked.    6. Moderate persistent asthma without complication  7. Restrictive lung disease  Patient with a history of asthma as well as some restrictive lung disease.  Most recent PFT as below.  Has not seen pulmonology in at least the last couple of years.  Has persistent wheezing generalized on exam today.  She does states she is feeling more short of breath especially with exertion.  She feels this is related to her losartan but I suspect more related to worsening restrictive lung disease or asthma.  She continues to use her Flovent daily and recommended she still do this.  Has not been using albuterol inhaler as needed and I recommended she trial.  Does not read refills on any of these.  - Adult Pulmonary Medicine Referral; Future    8. Dark urine  Patient states she has darker urine more  yellow.  Not red or pink.  Will check UA.  But no other concerning symptoms.  - UA Macro with Reflex to Micro and Culture - lab collect; Future    9. Benign essential hypertension  Controlled today on losartan.  She feels that losartan may be causing hand numbness or shortness of breath but doubt it.  She is interested in switching but does not want to do this now.    10. Hand numbness  - Physical Therapy Referral; Future    11. Chronic joint pain  Patient has followed with rheumatology in the past for chronic joint pain and had some higher ESR's and rheumatologic tests that have been positive.  She is interested in continuing follow-up and work-up of this.  I recommended she resee rheumatology.  - Adult Rheumatology  Referral; Future    12. Encounter for screening mammogram for breast cancer  Due in March but she is going on a trip to [place] for 2 months so we will place order now to be scheduled after.  - *MA Screening Digital Bilateral; Future    13. High priority for 2019-nCoV vaccine  - COVID-19,PF,MODERNA (18+ Yrs BOOSTER .25mL)    Follow-up in: 1 year for physical    El Ballard MD    Subjective:     Roverto Gonzalez is a 57 year old female who presents for an annual exam.     Chief Complaint   Patient presents with     Physical     moderna booster. patient is fasting     Answers for HPI/ROS submitted by the patient on 2/16/2022  Frequency of exercise:: None  Getting at least 3 servings of Calcium per day:: NO  Diet:: Low salt  Taking medications regularly:: Yes  Medication side effects:: Muscle aches  Bi-annual eye exam:: Yes  Dental care twice a year:: NO  Sleep apnea or symptoms of sleep apnea:: None  Additional concerns today:: No    FEV1/FVC is 0.87 and is normal.  FEV1 is 100% predicted and is normal.  FVC is 92% predicted and is normal.  There was no improvement in spirometry after a single inhaled dose of bronchodilator.  TLC is 76% predicted and is reduced.  RV is 77% predicted and is  reduced.  DLCO is 89% predicted and is normal when it   is corrected for hemoglobin.  The flow volume loop is normal Yes.     Impression:  Full Pulmonary Function Test is abnormal. Lung volumes are consistent with mild restrictive ventilatory defect. DLco is normal.    Colonoscopy: due in 8/27  Dexa: never  Mammogram: Hx of breast cancer, now in remission, last 3/21. Due again in march.   Pap smear: due again in 10/25    Immunization History   Administered Date(s) Administered     Flu, Unspecified 10/15/2010, 09/12/2011, 12/03/2013, 12/07/2015     HepA-Adult 09/20/2004, 02/04/2020     Influenza (H1N1) 01/01/2010     Influenza (IIV3) PF 10/15/2010, 09/12/2011     Influenza Quad, Recombinant, pf(RIV4) (Flublok) 10/20/2020     Influenza Vaccine IM > 6 months Valent IIV4 (Alfuria,Fluzone) 12/03/2013, 12/07/2015     Influenza Vaccine IM Ages 6-35 Months 4 Valent (PF) 12/15/2014     Meningococcal (Menactra ) 02/09/2018     Pneumococcal 23 valent 10/20/2020     Poliovirus, inactivated (IPV) 09/20/2004     Tdap (Adacel,Boostrix) 12/07/2015     Tdap (Adult) Unspecified Formulation 09/20/2004, 12/07/2015     Typhoid IM 09/20/2004, 02/04/2020     Yellow Fever 09/20/2004, 09/13/2011     Immunization status: Refuses Immunization.     Current Outpatient Medications   Medication Sig Dispense Refill     albuterol (PROAIR HFA/PROVENTIL HFA/VENTOLIN HFA) 108 (90 Base) MCG/ACT inhaler INHALE 2 PUFFS INTO THE LUNGS EVERY 6 HOURS AS NEEDED FOR WHEEZING 18 g 8     fluticasone (FLOVENT HFA) 44 MCG/ACT inhaler INHALE 2 PUFFS BY MOUTH TWICE DAILY       losartan (COZAAR) 25 MG tablet Take 1 tablet (25 mg) by mouth daily **FOLLOW UP NEEDED FOR REFILLS** 90 tablet 0     vitamin D2 (ERGOCALCIFEROL) 34092 units (1250 mcg) capsule TK 1 C PO Q 7 DAYS       benzonatate (TESSALON) 200 MG capsule  (Patient not taking: Reported on 2/16/2022)       Past Medical History:   Diagnosis Date     Breast cancer (H) 2014    lumpectomy on left with radiation   "    Breast cancer (H) 2014    s/p resection and radiation     DCIS (ductal carcinoma in situ) of breast     ER-NC-     GERD (gastroesophageal reflux disease)      Hx of radiation therapy      Hypertension      Vitamin D deficiency      Past Surgical History:   Procedure Laterality Date     BIOPSY BREAST Left 2014     LUMPECTOMY BREAST      breast left     LUMPECTOMY BREAST Left       DRAIN OF SEROMA/HEMATOMA/ABSCESS/CYST Right 2017     Patient has no known allergies.  Family History   Problem Relation Age of Onset     Cancer Father      Throat cancer Father          \"I think age 82.\"     Hypertension Mother      Hereditary Breast and Ovarian Cancer Syndrome No family hx of      Social History     Socioeconomic History     Marital status:      Spouse name: Not on file     Number of children: Not on file     Years of education: Not on file     Highest education level: Not on file   Occupational History     Not on file   Tobacco Use     Smoking status: Never Smoker     Smokeless tobacco: Never Used   Substance and Sexual Activity     Alcohol use: No     Drug use: No     Sexual activity: Yes     Partners: Male   Other Topics Concern     Parent/sibling w/ CABG, MI or angioplasty before 65F 55M? Not Asked   Social History Narrative     Not on file     Social Determinants of Health     Financial Resource Strain: Not on file   Food Insecurity: Not on file   Transportation Needs: Not on file   Physical Activity: Not on file   Stress: Not on file   Social Connections: Not on file   Intimate Partner Violence: Not on file   Housing Stability: Not on file       Review of Systems  Complete ROS negative except as noted in the HPI    Objective:      Vitals:    22 1122   BP: 126/60   BP Location: Right arm   Patient Position: Sitting   Cuff Size: Adult Regular   Pulse: 53   Temp: 97.7  F (36.5  C)   TempSrc: Temporal   SpO2: 98%   Weight: 88.9 kg (195 lb 14.4 oz)   Height: 1.626 m (5' 4\")       General " appearance: Alert, cooperative, no distress, appears stated age  Head: Normocephalic, atraumatic, without obvious abnormality  EARS: TM's gray dull with structures seen bilaterally  Eyes: Pupils equal round, reactive.  Conjunctiva clear.  Nose: Nares normal, no drainage.  Throat: Lips, mucosa, tongue normal mucosa pink and moist  Neck: Supple, symmetric, trachea midline, no adenopathy.  No thyroid enlargement, tenderness or nodules.    Lungs: mild wheezing throughout, not labored, no crackles  Heart: Regular rate and rhythm, normal S1 and S2, no murmur, rub or gallop.  Abdomen: Soft, nontender, nondistended.  Bowel sounds active in all 4 quadrants.  No masses or organomegaly.  Extremities: Extremities normal, atraumatic.  No cyanosis or edema.  Skin: Skin color, texture, turgor normal no rashes or lesions on limited skin exam  Neurologic: CN II through XII intact, normal strength.    El Cameron MD

## 2022-02-16 NOTE — LETTER
My Asthma Action Plan    Name: Roverto Gonzalez   YOB: 1965  Date: 2/16/2022   My doctor: El Cameron MD   My clinic: [facility]        My Control Medicine: Fluticasone propionate (Flovent HFA) - 44 mcg twice daily  My Rescue Medicine: Albuterol (Proair/Ventolin/Proventil HFA) 2-4 puffs EVERY 4 HOURS as needed. Use a spacer if recommended by your provider.   My Asthma Severity:   Moderate Persistent                 GREEN ZONE   Good Control    I feel good    No cough or wheeze    Can work, sleep and play without asthma symptoms       Take your asthma control medicine every day.     1. If exercise triggers your asthma, take your rescue medication    15 minutes before exercise or sports, and    During exercise if you have asthma symptoms  2. Spacer to use with inhaler: If you have a spacer, make sure to use it with your inhaler             YELLOW ZONE Getting Worse  I have ANY of these:    I do not feel good    Cough or wheeze    Chest feels tight    Wake up at night   1. Keep taking your Green Zone medications  2. Start taking your rescue medicine:    every 20 minutes for up to 1 hour. Then every 4 hours for 24-48 hours.  3. If you stay in the Yellow Zone for more than 12-24 hours, contact your doctor.  4. If you do not return to the Green Zone in 12-24 hours or you get worse, start taking your oral steroid medicine if prescribed by your provider.           RED ZONE Medical Alert - Get Help  I have ANY of these:    I feel awful    Medicine is not helping    Breathing getting harder    Trouble walking or talking    Nose opens wide to breathe       1. Take your rescue medicine NOW  2. If your provider has prescribed an oral steroid medicine, start taking it NOW  3. Call your doctor NOW  4. If you are still in the Red Zone after 20 minutes and you have not reached your doctor:    Take your rescue medicine again and    Call 911 or go to the emergency room right away    See your  regular doctor within 2 weeks of an Emergency Room or Urgent Care visit for follow-up treatment.          Annual Reminders:  Meet with Asthma Educator,  Flu Shot in the Fall, consider Pneumonia Vaccination for patients with asthma (aged 19 and older).    Pharmacy: [id number] - [place], [place] - [address] AT [facility] OF [address]    Electronically signed by El Cameron MD   Date: 02/16/22                      Asthma Triggers  How To Control Things That Make Your Asthma Worse    Triggers are things that make your asthma worse.  Look at the list below to help you find your triggers and what you can do about them.  You can help prevent asthma flare-ups by staying away from your triggers.      Trigger                                                          What you can do   Cigarette Smoke  Tobacco smoke can make asthma worse. Do not allow smoking in your home, car or around you.  Be sure no one smokes at a child s day care or school.  If you smoke, ask your health care provider for ways to help you quit.  Ask family members to quit too.  Ask your health care provider for a referral to Quit Plan to help you quit smoking, or call 6-569-756-PLAN.     Colds, Flu, Bronchitis  These are common triggers of asthma. Wash your hands often.  Don t touch your eyes, nose or mouth.  Get a flu shot every year.     Dust Mites  These are tiny bugs that live in cloth or carpet. They are too small to see. Wash sheets and blankets in hot water every week.   Encase pillows and mattress in dust mite proof covers.  Avoid having carpet if you can. If you have carpet, vacuum weekly.   Use a dust mask and HEPA vacuum.   Pollen and Outdoor Mold  Some people are allergic to trees, grass, or weed pollen, or molds. Try to keep your windows closed.  Limit time out doors when pollen count is high.   Ask you health care provider about taking medicine during allergy season.     Animal Dander  Some people are allergic to  skin flakes, urine or saliva from pets with fur or feathers. Keep pets with fur or feathers out of your home.    If you can t keep the pet outdoors, then keep the pet out of your bedroom.  Keep the bedroom door closed.  Keep pets off cloth furniture and away from stuffed toys.     Mice, Rats, and Cockroaches   Some people are allergic to the waste from these pests.   Cover food and garbage.  Clean up spills and food crumbs.  Store grease in the refrigerator.   Keep food out of the bedroom.   Indoor Mold  This can be a trigger if your home has high moisture. Fix leaking faucets, pipes, or other sources of water.   Clean moldy surfaces.  Dehumidify basement if it is damp and smelly.   Smoke, Strong Odors, and Sprays  These can reduce air quality. Stay away from strong odors and sprays, such as perfume, powder, hair spray, paints, smoke incense, paint, cleaning products, candles and new carpet.   Exercise or Sports  Some people with asthma have this trigger. Be active!  Ask your doctor about taking medicine before sports or exercise to prevent symptoms.    Warm up for 5-10 minutes before and after sports or exercise.     Other Triggers of Asthma  Cold air:  Cover your nose and mouth with a scarf.  Sometimes laughing or crying can be a trigger.  Some medicines and food can trigger asthma.

## 2022-02-17 LAB — DEPRECATED CALCIDIOL+CALCIFEROL SERPL-MC: 42 UG/L (ref 30–80)

## 2022-02-17 NOTE — RESULT ENCOUNTER NOTE
I called the patient but no answer. Left message explaining recent clinic results and next steps. Advised to call clinic or send [facility] message with questions.    Dr. El Ballrad

## 2022-03-15 DIAGNOSIS — J98.4 RESTRICTIVE LUNG DISEASE: Primary | ICD-10-CM

## 2022-03-15 RX ORDER — FLUTICASONE PROPIONATE 44 MCG
AEROSOL WITH ADAPTER (GRAM) INHALATION
Qty: 10.6 G | Refills: 4 | Status: SHIPPED | OUTPATIENT
Start: 2022-03-15 | End: 2023-01-09

## 2022-03-15 NOTE — TELEPHONE ENCOUNTER
"Routing refill request to provider for review/approval because:  Please review. Refill amount needs to be in grams not by inhaler.     Last Written Prescription Date:  5/20/2021  Last Fill Quantity: 1 inhaler,  # refills: 3   Last office visit provider:  5/20/2021     Requested Prescriptions   Pending Prescriptions Disp Refills     FLOVENT HFA 44 MCG/ACT inhaler [Pharmacy Med Name: FLOVENT HFA 44MCG ORAL INH 120INH] 10.6 g      Sig: INHALE 2 PUFFS BY MOUTH TWICE DAILY       Inhaled Steroids Protocol Passed - 3/15/2022 12:59 PM        Passed - Patient is age 12 or older        Passed - Asthma control assessment score within normal limits in last 6 months     Please review ACT score.           Passed - Medication is active on med list        Passed - Recent (6 mo) or future (30 days) visit within the authorizing provider's specialty     Patient had office visit in the last 6 months or has a visit in the next 30 days with authorizing provider or within the authorizing provider's specialty.  See \"Patient Info\" tab in inbasket, or \"Choose Columns\" in Meds & Orders section of the refill encounter.                 Vandana Weber RN 03/15/22 1:00 PM  "

## 2022-05-12 DIAGNOSIS — I10 HYPERTENSION, UNSPECIFIED TYPE: ICD-10-CM

## 2022-05-12 RX ORDER — LOSARTAN POTASSIUM 25 MG/1
25 TABLET ORAL DAILY
Qty: 30 TABLET | Refills: 0 | Status: SHIPPED | OUTPATIENT
Start: 2022-05-12 | End: 2022-06-30

## 2022-05-12 NOTE — TELEPHONE ENCOUNTER
[facility] Call Center    Phone Message    May a detailed message be left on voicemail: yes     Reason for Call: Medication Refill Request    Has the patient contacted the pharmacy for the refill? Yes   Name of medication being requested: losartan (COZAAR) 25 MG tablet  Provider who prescribed the medication: RUBY Del Cid  Pharmacy: [address]  Date medication is needed: ASAP         Action Taken: Message routed to:  [facility]    Travel Screening: Not Applicable

## 2022-05-12 NOTE — LETTER
5/12/2022        RE: Milagrosyosef YE Carlos  [address] [place] [id number]        We recently provided you with medication refills.  Many medications require routine follow-up with your doctor.    Your prescription(s) have been refilled for 30 days so you may have time for the above noted follow-up. Please call to schedule soon so we can assure you have an appointment before your next refills are needed. If you have already made a follow up appointment, please disregard this letter.           Sincerely,      [facility] [place]     (Formerly known as [facility])

## 2022-06-30 ENCOUNTER — TELEPHONE (OUTPATIENT)
Dept: NEUROLOGY | Facility: CLINIC | Age: 57
End: 2022-06-30

## 2022-06-30 DIAGNOSIS — I10 HYPERTENSION, UNSPECIFIED TYPE: ICD-10-CM

## 2022-06-30 DIAGNOSIS — I67.1 CEREBRAL ANEURYSM, NONRUPTURED: Primary | ICD-10-CM

## 2022-06-30 RX ORDER — LOSARTAN POTASSIUM 25 MG/1
TABLET ORAL
Qty: 14 TABLET | Refills: 0 | Status: SHIPPED | OUTPATIENT
Start: 2022-06-30 | End: 2022-07-18

## 2022-06-30 NOTE — TELEPHONE ENCOUNTER
Health Call Center    Phone Message    May a detailed message be left on voicemail: yes     Reason for Call: Order(s): Other:   Reason for requested: Patient requesting MRI  Date needed: Asap  Provider name: Maria EugeniaMalik    Patient is requesting an MRI to be done prior to her appt on 12/1/2022. Writer doesn't see an active MRI order. Please call patient back to advise at # [phone]            Action Taken: Message routed to:  Other: [facility]     Travel Screening: Not Applicable

## 2022-06-30 NOTE — TELEPHONE ENCOUNTER
Refill request for Losartan. Pt last seen 6/16/21 and was due back around 9/2021. Pt does not have follow up scheduled yet. Will send in 14 day supply with zero refills.     Sarah Vivas RN on 6/30/2022 at 4:24 PM

## 2022-07-07 NOTE — TELEPHONE ENCOUNTER
Spoke with pt and let her know that MRA was ordered. She verbalized understanding and will call once she has this scheduled so that a sooner appt with Dr. Del Cid can be made.     Sarah Vivas RN on 7/7/2022 at 9:19 AM

## 2022-07-15 DIAGNOSIS — I10 HYPERTENSION, UNSPECIFIED TYPE: ICD-10-CM

## 2022-07-15 NOTE — TELEPHONE ENCOUNTER
Reason for Call:  Medication or medication refill:    Do you use a [facility]?  Name of the pharmacy and phone number for the current request:  [id number] - [facility] [address] AT [facility] OF [facility] & [address]    Name of the medication requested: losartan (COZAAR) 25 MG tablet    Other request: Pt calling today requesting for med refill. Pt does not have any left for tomorrow and would like this to be refilled as soon as possible.    Can we leave a detailed message on this number? YES    Phone number patient can be reached at: Home number on file [phone] (home)    Best Time: Any time    Call taken on 7/15/2022 at 4:01 PM by Missy Dawson

## 2022-07-16 RX ORDER — LOSARTAN POTASSIUM 25 MG/1
TABLET ORAL
Qty: 14 TABLET | Refills: 0 | OUTPATIENT
Start: 2022-07-16

## 2022-07-17 DIAGNOSIS — I10 HYPERTENSION, UNSPECIFIED TYPE: ICD-10-CM

## 2022-07-18 RX ORDER — LOSARTAN POTASSIUM 25 MG/1
TABLET ORAL
Qty: 30 TABLET | Refills: 5 | Status: SHIPPED | OUTPATIENT
Start: 2022-07-18 | End: 2022-12-01

## 2022-07-19 ENCOUNTER — TELEPHONE (OUTPATIENT)
Dept: RHEUMATOLOGY | Facility: CLINIC | Age: 57
End: 2022-07-19

## 2022-07-19 NOTE — TELEPHONE ENCOUNTER
[facility] Call Center    Phone Message    May a detailed message be left on voicemail: yes     Reason for Call: Order(s): Other:   Reason for requested: Lab work   Date needed: before her appointment on 08/15  Provider name: Dr. Stewart      Pt is schedule for a NEW pt consult with Dr Stewart 08/15 and was hoping to get so lab work done prior to her appointment.         Action Taken: Other: [facility]    Travel Screening: Not Applicable

## 2022-07-19 NOTE — TELEPHONE ENCOUNTER
Pt notified that pt needs to be seen prior to Dr Stewart ordering labs.   [facility]: Preventive Care And Screening: Influenza Immunization: Influenza Immunization Administered during Influenza season Detail Level: Detailed

## 2022-07-20 ENCOUNTER — ANCILLARY PROCEDURE (OUTPATIENT)
Dept: MAMMOGRAPHY | Facility: CLINIC | Age: 57
End: 2022-07-20
Attending: STUDENT IN AN ORGANIZED HEALTH CARE EDUCATION/TRAINING PROGRAM
Payer: COMMERCIAL

## 2022-07-20 DIAGNOSIS — Z12.31 ENCOUNTER FOR SCREENING MAMMOGRAM FOR BREAST CANCER: ICD-10-CM

## 2022-07-20 PROCEDURE — 77067 SCR MAMMO BI INCL CAD: CPT

## 2022-07-20 NOTE — TELEPHONE ENCOUNTER
Health Call Center    Phone Message    May a detailed message be left on voicemail: yes     Reason for Call: Medication Question or concern regarding medication   Prescription Clarification  Name of Medication: losartan (COZAAR) 25 MG tablet  Prescribing Provider:         What on the order needs clarification?   Patient called states that she is out of her emergency refill she received, patient states that she called and pharmacy have not received Rx from clinic yet.  Please fax refills to  [id number] - [facility] [address] AT [facility] [address]      Action Taken: Other: [facility]    Travel Screening: Not Applicable

## 2022-08-01 ENCOUNTER — HOSPITAL ENCOUNTER (OUTPATIENT)
Dept: MRI IMAGING | Facility: HOSPITAL | Age: 57
Discharge: HOME | End: 2022-08-01
Attending: PSYCHIATRY & NEUROLOGY | Admitting: PSYCHIATRY & NEUROLOGY
Payer: COMMERCIAL

## 2022-08-01 DIAGNOSIS — I67.1 CEREBRAL ANEURYSM, NONRUPTURED: ICD-10-CM

## 2022-08-01 PROCEDURE — 70544 MR ANGIOGRAPHY HEAD W/O DYE: CPT

## 2022-08-15 ENCOUNTER — OFFICE VISIT (OUTPATIENT)
Dept: RHEUMATOLOGY | Facility: CLINIC | Age: 57
End: 2022-08-15
Payer: COMMERCIAL

## 2022-08-15 ENCOUNTER — HOSPITAL ENCOUNTER (OUTPATIENT)
Dept: GENERAL RADIOLOGY | Facility: HOSPITAL | Age: 57
Discharge: HOME | End: 2022-08-15
Attending: INTERNAL MEDICINE | Admitting: INTERNAL MEDICINE
Payer: COMMERCIAL

## 2022-08-15 VITALS
WEIGHT: 198.7 LBS | HEIGHT: 64 IN | HEART RATE: 56 BPM | BODY MASS INDEX: 33.92 KG/M2 | DIASTOLIC BLOOD PRESSURE: 72 MMHG | SYSTOLIC BLOOD PRESSURE: 140 MMHG

## 2022-08-15 DIAGNOSIS — G89.29 CHRONIC PAIN OF MULTIPLE JOINTS: ICD-10-CM

## 2022-08-15 DIAGNOSIS — R70.0 ELEVATED SED RATE: ICD-10-CM

## 2022-08-15 DIAGNOSIS — R76.8 JO-1 ANTIBODY POSITIVE: ICD-10-CM

## 2022-08-15 DIAGNOSIS — M15.0 PRIMARY OSTEOARTHRITIS INVOLVING MULTIPLE JOINTS: Primary | ICD-10-CM

## 2022-08-15 DIAGNOSIS — M25.50 CHRONIC PAIN OF MULTIPLE JOINTS: ICD-10-CM

## 2022-08-15 DIAGNOSIS — M15.0 PRIMARY OSTEOARTHRITIS INVOLVING MULTIPLE JOINTS: ICD-10-CM

## 2022-08-15 PROCEDURE — 73560 X-RAY EXAM OF KNEE 1 OR 2: CPT | Mod: 50,FY

## 2022-08-15 PROCEDURE — 99214 OFFICE O/P EST MOD 30 MIN: CPT | Performed by: INTERNAL MEDICINE

## 2022-08-15 NOTE — PROGRESS NOTES
This document was created using a software with less than 100% fidelity, at times resulting in unintended, even erroneous syntax and grammar.  The reader is advised to keep this under consideration while reviewing, interpreting this note.      Rheumatology Consult Note      Roverto Gonzalez     YOB: 1965 Age: 57 year old    Date of visit: 8/15/22    PCP: Anisha Ramirez    Chief Complaint   No chief complaint on file.      Assessment and Plan     Roverto was seen today for consult.    Diagnoses and all orders for this visit:    Primary osteoarthritis involving multiple joints  -     XR Knee AP/Lat Standing Bilateral; Future    Jamila-1 antibody positive    Chronic pain of multiple joints  -     XR Knee AP/Lat Standing Bilateral; Future    Elevated sed rate    Other orders  -     Adult Rheumatology  Referral           This patient has polyarthralgia in association with osteoarthritis most clearly documented in her knees, in addition she has a Jamila 1 antibody ALEYDA without be associated stigmata of a clinical entity such as antisynthetase syndrome.  There have been no features suggestive of myositis/myopathy.  This is discussed.  The difference between a positive serology and the clinical syndrome outlined.  Her sed rate has been modestly elevated chronically does not appear to be associated with a active rheumatologic phenomenon.  This is discussed reassured.  The options for management of OA reviewed.  She is going to continue her current over-the-counter measures, nonpharmacologic interventions were reviewed.  She is to follow-up here on as-needed basis.      HPI   Roverto Gonzalez is a 57 year old female  is seen today for evaluation of and establishment of care.  As she has arthralgias.  She has followed up with the other office and [facility] for the past several years.  She was found to have Jamila 1 antibody, that was on 2/17/2016 at 45 and 5/22/2018 and 27.  She does not have features suggestive of  connective tissue disease, antisynthetase features, or interstitial lung disease.  She has not followed up with the pulmonary for the past 2 years I have asked her to check with her lung specialist.  As per as she is concerned her main area pain is in the knees going up and down the steps without swelling sometimes elbow pain and generalized achiness.  She wondered if she should get the labs drawn again such as sed rate and CRP which have been modestly, chronically, stable he elevated.  She reports no history of rash such as on her face, digits, stomatitis, pleuritic symptoms, shortness of breath just pain, her weight appetite remains steady..  For her knee symptoms she takes Tylenol that she finds helpful.  She works at her daughter's [employer].  Never smoked.  No alcohol.           Active Problem List     Patient Active Problem List   Diagnosis     Malignant neoplasm of left female breast, unspecified estrogen receptor status, unspecified site of breast (H)     Malignant neoplasm of lower-outer quadrant of female breast (H)     Vitamin D deficiency     Thyroid cyst     Restrictive lung disease     Prediabetes     Sicca syndrome (H)     Mixed hyperlipidemia     Asthma     Benign essential hypertension     Cerebral arterial aneurysm     DCIS (ductal carcinoma in situ)     Helicobacter pylori infection     Obesity (BMI 30.0-34.9)     Past Medical History     Past Medical History:   Diagnosis Date     Breast cancer (H) 2014    lumpectomy on left with radiation      Breast cancer (H) 2014    s/p resection and radiation     DCIS (ductal carcinoma in situ) of breast 2014    ER-ID-     GERD (gastroesophageal reflux disease)      Hx of radiation therapy 2014     Hypertension      Vitamin D deficiency      Past Surgical History     Past Surgical History:   Procedure Laterality Date     BIOPSY BREAST Left 2014     LUMPECTOMY BREAST      breast left     LUMPECTOMY BREAST Left 6/14     US DRAIN OF  "SEROMA/HEMATOMA/ABSCESS/CYST Right 2017     Allergy   No Known Allergies  Current Medication List     Current Outpatient Medications   Medication Sig     albuterol (PROAIR HFA/PROVENTIL HFA/VENTOLIN HFA) 108 (90 Base) MCG/ACT inhaler INHALE 2 PUFFS INTO THE LUNGS EVERY 6 HOURS AS NEEDED FOR WHEEZING     benzonatate (TESSALON) 200 MG capsule  (Patient not taking: Reported on 2022)     FLOVENT HFA 44 MCG/ACT inhaler INHALE 2 PUFFS BY MOUTH TWICE DAILY     losartan (COZAAR) 25 MG tablet TAKE 1 TABLET(25 MG) BY MOUTH DAILY. FOLLOW UP AS NEEDED FOR REFILLS**     vitamin D2 (ERGOCALCIFEROL) 49559 units (1250 mcg) capsule TK 1 C PO Q 7 DAYS     No current facility-administered medications for this visit.            Family History     Family History   Problem Relation Age of Onset     Cancer Father      Throat cancer Father          \"I think age 82.\"     Hypertension Mother      Hereditary Breast and Ovarian Cancer Syndrome No family hx of          Physical Exam     COMPREHENSIVE EXAMINATION:  Vitals:    08/15/22 1024   BP: (!) 140/72   BP Location: Right arm   Patient Position: Sitting   Cuff Size: Adult Regular   Pulse: 56   Weight: 90.1 kg (198 lb 11.2 oz)   Height: 1.626 m (5' 4\")     A well appearing alert oriented female. Vital data as noted above. Her eyes examined for inflammation/scleromalacia. ENT examined for oral mucositis, moisture, thrush, nasal deformity, external ear redness, deformity. Her neck is examined for suppleness and lymphadenopathy.  Cardiopulmonary examination without dyspnea at rest, use of accessory muscles of breathing, wheezing, edema, peripheral or central cyanosis.  Abdomen examined for softness, tenderness, obvious organomegaly.  Skin examined for heliotrope, malar area eruption, lupus pernio, periungual erythema, sclerodactyly, papules, erythema nodosum, purpura, nail pitting, onycholysis, and obvious psoriasis lesion. Neurological examination done for alertness, speech, facial " "symmetry,  tone and power in upper and lower extremities, and gait. The joint examination is performed for swelling, tenderness, warmth, erythema, and range of motion in the following joints: DIPs, PIPs, MCPs, wrists, first CMC's, elbows, shoulders, hips, knees, ankles, feet; spine for range of motion and paraspinal muscles for tenderness. The salient normal / abnormal findings are appended.  She does not have synovitis in palpable joints she has mild tenderness in the joint lines of the knees.  Is more so medially.  There is no dactylitis of digits.  She does not have \"mechanics\" hands.    Labs / Imaging (select studies)     C4 Complement   Date Value Ref Range Status   02/17/2016 21 19 - 59 mg/dL Final      Hemoglobin   Date Value Ref Range Status   02/16/2022 12.4 11.7 - 15.7 g/dL Final   03/08/2021 12.9 12.0 - 16.0 g/dL Final   10/20/2020 12.9 12.0 - 16.0 g/dL Final     Urea Nitrogen   Date Value Ref Range Status   02/16/2022 11 8 - 22 mg/dL Final   07/22/2019 14 8 - 22 mg/dL Final   03/25/2019 14 8 - 22 mg/dL Final     Erythrocyte Sedimentation Rate   Date Value Ref Range Status   10/20/2020 34 (H) 0 - 20 mm/hr Final   11/19/2019 44 (H) 0 - 20 mm/hr Final   09/03/2019 50 (H) 0 - 20 mm/hr Final     CRP   Date Value Ref Range Status   10/20/2020 0.8 0.0 - 0.8 mg/dL Final   11/19/2019 1.4 (H) 0.0 - 0.8 mg/dL Final   09/03/2019 1.8 (H) 0.0 - 0.8 mg/dL Final     AST   Date Value Ref Range Status   02/16/2022 20 0 - 40 U/L Final   10/20/2020 38 0 - 40 U/L Final   03/25/2019 28 0 - 40 U/L Final     Albumin   Date Value Ref Range Status   02/16/2022 4.1 3.5 - 5.0 g/dL Final   10/20/2020 4.0 3.5 - 5.0 g/dL Final   03/25/2019 3.8 3.5 - 5.0 g/dL Final     Alkaline Phosphatase   Date Value Ref Range Status   02/16/2022 74 45 - 120 U/L Final   03/25/2019 85 45 - 120 U/L Final   06/27/2018 100 45 - 120 U/L Final     ALT   Date Value Ref Range Status   02/16/2022 19 0 - 45 U/L Final   10/20/2020 40 0 - 45 U/L Final "   03/25/2019 35 0 - 45 U/L Final          Immunization History     Immunization History   Administered Date(s) Administered     COVID-19,PF,Moderna 03/09/2021, 04/06/2021     COVID-19,PF,Moderna Booster 02/16/2022     Flu, Unspecified 10/15/2010, 09/12/2011, 12/03/2013, 12/07/2015     HepA-Adult 09/20/2004, 02/04/2020     Influenza (H1N1) 01/01/2010     Influenza (IIV3) PF 10/15/2010, 09/12/2011     Influenza Quad, Recombinant, pf(RIV4) (Flublok) 10/20/2020     Influenza Vaccine IM > 6 months Valent IIV4 (Alfuria,Fluzone) 12/03/2013, 12/07/2015     Influenza Vaccine IM Ages 6-35 Months 4 Valent (PF) 12/15/2014     Meningococcal (Menactra ) 02/09/2018     Pneumococcal 23 valent 10/20/2020     Poliovirus, inactivated (IPV) 09/20/2004     Tdap (Adacel,Boostrix) 12/07/2015     Tdap (Adult) Unspecified Formulation 09/20/2004, 12/07/2015     Typhoid IM 09/20/2004, 02/04/2020     Yellow Fever 09/20/2004, 09/13/2011

## 2022-09-18 ENCOUNTER — HEALTH MAINTENANCE LETTER (OUTPATIENT)
Age: 57
End: 2022-09-18

## 2022-12-01 ENCOUNTER — OFFICE VISIT (OUTPATIENT)
Dept: NEUROLOGY | Facility: CLINIC | Age: 57
End: 2022-12-01
Payer: COMMERCIAL

## 2022-12-01 VITALS
DIASTOLIC BLOOD PRESSURE: 76 MMHG | WEIGHT: 198 LBS | HEART RATE: 56 BPM | BODY MASS INDEX: 33.99 KG/M2 | RESPIRATION RATE: 16 BRPM | SYSTOLIC BLOOD PRESSURE: 138 MMHG

## 2022-12-01 DIAGNOSIS — I10 HYPERTENSION, UNSPECIFIED TYPE: ICD-10-CM

## 2022-12-01 DIAGNOSIS — I67.1 CEREBRAL ANEURYSM, NONRUPTURED: Primary | ICD-10-CM

## 2022-12-01 DIAGNOSIS — R20.0 HAND NUMBNESS: ICD-10-CM

## 2022-12-01 PROCEDURE — 99214 OFFICE O/P EST MOD 30 MIN: CPT | Performed by: PSYCHIATRY & NEUROLOGY

## 2022-12-01 RX ORDER — LOSARTAN POTASSIUM 25 MG/1
TABLET ORAL
Qty: 90 TABLET | Refills: 3 | Status: SHIPPED | OUTPATIENT
Start: 2022-12-01 | End: 2023-01-30

## 2022-12-01 NOTE — PROGRESS NOTES
NEUROLOGY OUTPATIENT PROGRESS NOTE  Dec 1, 2022     CHIEF COMPLAINT/REASON FOR VISIT/REASON FOR CONSULT  Patient presents with:  Follow Up    REASON FOR CONSULTATION- Numbness    REFERRAL SOURCE  Dr. Finkelstein  CC Dr. Finkelstein    HISTORY OF PRESENT ILLNESS  Roverto Gonzalez is a 57 year old female seen today for evaluation of hand numbness.  Reports that the numbness came on in 3 months ago.  All 5 fingers are involved.  Is present on both sides.  The symptoms come and go.  Washing the dishes does make the symptoms come on.  She does have some left-sided weakness as well.  She does have chronic neck pain more on the left side.  Denies any shooting pain down the arm.  She is done physical therapy in the past which is helped the neck pain.  Reports no recent falls or injuries.  Occasionally will have intermittent numbness in her legs here and there.    Patient does have a history of a previous aneurysm that was being managed by Dr. Mcclain.  Patient reports no ongoing headaches there is continued some pressure in the left side of her head.  Has not had a repeat scan for several years.  No family history of aneurysm.  No smoking history.    3/16/21  Patient returns today.  Reports that he continues to have hand numbness and hand pain.  This is mainly in the left hand in the fourth and fifth digit.  She continues to have neck pain as well.  Further reports of pressure in her ear.  Reports no headaches.  Reports it is mainly in the year.  She further was identified to have a aneurysm on her MRA.  Discussed significance of this.  Discussed treatment options.  Discussed about routine surveillance.    She has been working with her primary care doctor to get the blood pressure under good control.  She was having some dizziness due to high blood pressure medication and this has been decreased.  She further reports no's history of aneurysm risk factors.  No family history of aneurysms.  No history of tobacco abuse.  Reports no  headaches.    Ear pressure is in the ear.  Further reports tinnitus.  Reports no headaches.    6/16/21  Patient returns today.  Reports that she had to travel to [place] and has not been able to do the testing/therapy that had recommended.      She reports that the numbness in her hands is intermittent.  She has not done any physical therapy.  Feels that these are getting better.  Continues to have pressure in the years.  Is seeing the ENT doctor.  Has not been able to have the appointment so far.    Her MRI has shown an aneurysm in the past.  Reports no headaches.  We further talked about her blood pressure she reports that her blood pressure has been under good control though more recently she is been having a lot of cough.  She feels that she stopped the lisinopril and the cough goes away.  Discussed about side effects of the lisinopril and she would like me to change the medication for her.    12/1/22  Patient returns today  1.  No further numbness in her hands.  2.  Ear pressure is resolved.  Did not see ENT  3.  No symptoms attributable to the aneurysms.  No headaches.  Has been taking the blood pressure medication without any side effects.  No coughing.  Overall has been doing well.  Plans to travel this winter.  MRA did not show any worsening of the aneurysms.  Denies any secondhand smoking exposure.  Is trying to go for walks every day.  No strenuous exercise.    Previous history is reviewed and this is unchanged.    PAST MEDICAL/SURGICAL HISTORY  Past Medical History:   Diagnosis Date     Breast cancer (H) 2014    lumpectomy on left with radiation      Breast cancer (H) 2014    s/p resection and radiation     DCIS (ductal carcinoma in situ) of breast 2014    ER-RI-     GERD (gastroesophageal reflux disease)      Hx of radiation therapy 2014     Hypertension      Vitamin D deficiency      Patient Active Problem List   Diagnosis     Malignant neoplasm of left female breast, unspecified estrogen receptor  "status, unspecified site of breast (H)     Malignant neoplasm of lower-outer quadrant of female breast (H)     Vitamin D deficiency     Thyroid cyst     Restrictive lung disease     Prediabetes     Sicca syndrome (H)     Mixed hyperlipidemia     Asthma     Benign essential hypertension     Cerebral arterial aneurysm     DCIS (ductal carcinoma in situ)     Helicobacter pylori infection     Obesity (BMI 30.0-34.9)       FAMILY HISTORY  Family History   Problem Relation Age of Onset     Cancer Father      Throat cancer Father          \"I think age 82.\"     Hypertension Mother      Hereditary Breast and Ovarian Cancer Syndrome No family hx of    Negative for neuropathy or aneurysm    SOCIAL HISTORY  Social History     Tobacco Use     Smoking status: Never     Smokeless tobacco: Never   Substance Use Topics     Alcohol use: No     Drug use: No       SYSTEMS REVIEW  Twelve-system ROS was done and other than the HPI this was negative.   No new concerns    MEDICATIONS  albuterol (PROAIR HFA/PROVENTIL HFA/VENTOLIN HFA) 108 (90 Base) MCG/ACT inhaler, INHALE 2 PUFFS INTO THE LUNGS EVERY 6 HOURS AS NEEDED FOR WHEEZING  benzonatate (TESSALON) 200 MG capsule,   FLOVENT HFA 44 MCG/ACT inhaler, INHALE 2 PUFFS BY MOUTH TWICE DAILY  vitamin D2 (ERGOCALCIFEROL) 11792 units (1250 mcg) capsule, TK 1 C PO Q 7 DAYS    No current facility-administered medications on file prior to visit.       PHYSICAL EXAMINATION  VITALS: /76   Pulse 56   Resp 16   Wt 89.8 kg (198 lb)   BMI 33.99 kg/m    GENERAL: Healthy appearing, alert, no acute distress, normal habitus.  CARDIOVASCULAR: Extremities warm and well-perfused.  NEUROLOGICAL:  Patient is awake and oriented to self, place and time.  Attention span is normal.  Memory is grossly intact.  Language is fluent and follows commands appropriately.  Appropriate fund of knowledge. Cranial nerves 2-12 are intact. There is no pronator drift.  Motor exam shows 5/5 strength in all extremities. "  Tone is symmetric bilaterally in upper and lower extremities.  (Previously reflexes are symmetric and 2+ in upper extremities and lower extremities. Sensory exam is grossly intact to light touch, pin prick and vibration. ) Finger to nose and heel to shin is without dysmetria.  Romberg is negative.  Gait is normal and the patient is able to do tandem walk and walk on toes and heels.   Exam unchanged from before.    DIAGNOSTICS  MRI  HEAD MRI:   1.  Stable exam.  2.  No mass, hemorrhage or stroke.  3.  Stable mild/moderate burden of nonspecific white matter T2   prolongation. The appearance is not consistent with demyelination.   Findings may be secondary to chronic small vessel ischemic change.     HEAD MRA:   1.  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 No further follow-up of this finding is recommended.  2.  No intradural aneurysm.  3.  No high-grade stenosis.    OUTSIDE RECORDS  Outside referral notes were reviewed and pertinent information has been summarized;-patient was referred on October 14, 2020.  Was seen by Dr. Naqvi.  Patient was having joint pain.  Had elevated ESR and CRP.  Was also having some paresthesias in the forearm and fingertips.  This is more at night.  She negative Tinel's and Phalen's.  She is also been referred to neurology in the past but she did not pursue that.  Was seen by Dr. Mcclain in the past.  This was in July 2017.  Patient at the time was complaining of some back pain.  Patient did have an MRI which did show an aneurysm.  Patient was recommended to have a repeat scan in 5 years.  She was told to follow-up with her primary care doctor for her chronic back pain.    EMG     CLINICAL INTERPRETATION:  This is an abnormal nerve conduction and EMG study.  The needle study shows chronic left ulnar denervation but no active denervation findings.  Further clinical correlation is needed.     MRI C spine - images reviewed.  IMPRESSION:   1.   Minimal cervical spondylosis without spinal canal or foraminal stenosis. No spinal cord signal abnormality.    MRA - images personally reviewed  IMPRESSION:   1.  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   2.  Unchanged extradural 1-2 mm proximal cavernous left internal carotid artery aneurysm.  3.  Focal 2 mm outpouching arising medially from the right A1-2 junction likely represents the normal anterior commuting indicating artery rather than a true saccular aneurysm.    MRA 2022-images reviewed.    IMPRESSION:  1.  Overall stable appearance from 02/23/2021.     2.  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     3.  Specifically, stable unruptured 3 mm saccular aneurysm left MCA M1-M2 junction directed inferiorly with no change in size/morphology.     4.  Stable 1 mm laterally-directed aneurysm of the proximal left cavernous ICA segment, unruptured with no change in size or morphology.     5.  Stable lobular prominence of anterior communicate artery or a small 1 mm aneurysm at the level of the right A1-A2 junction as before.     6.  No new or enlarging aneurysms.     7.  Tortuosity of the high right cervical ICA level below the skull base and of the cavernous carotid siphons.     8.  No other changes.    IMPRESSION/REPORT/PLAN  Hand numbness with neck pain  Cerebral aneurysm, nonruptured  Pressure in ear  Hypertension    This is a 57 year old female with new onset of numbness in the hands.  MRI cervical spine was noncontributory.  EMG does show evidence of chronic left ulnar denervation though no active entrapment neuropathy.  Have improved on their own.  Previously physical therapy has been recommended.  Currently this is not bothering her.    Her MRI previously had shown a left MCA extradural cavernous ICA aneurysm.  Repeat MRI has shown multiple anterior circulation aneurysms.  There continues to be a left MCA M1 M2 aneurysm,  left cavernous ICA aneurysm, right A1 A2 junction aneurysm.  There is some tortuosity of the high right cervical ICA as well.  Overall scan is stable and we will repeat a scan in 1 year.  Discussed risk factors for aneurysms.  There is no family history smoking history though she does have a diagnosis of hypertension.    She has been having coughing with lisinopril.  Discussed blood pressure goal would be less than 120.  She is now on losartan without side effects.  Encouraged her to check her blood pressure at home to see if the dose of the losartan needs to be increased.    Return in 1 year with MRA    -     MRA Head w/o Contrast Angiogram; Future  -     losartan (COZAAR) 25 MG tablet; TAKE 1 TABLET(25 MG) BY MOUTH DAILY. FOLLOW UP AS NEEDED FOR REFILLS** Strength: 25 mg    Return in about 1 year (around 12/1/2023) for In-Clinic Visit (must).    Over 31 minutes were spent coordinating the care for the patient on the day of the encounter.  This includes previsit, during visit and post visit activities as documented above.  Counseling patient.  Reviewing MRI.  MRI ordered.  Multiple problems addressed.  Prescription management.  (Activities include but not inclusive of reviewing chart, reviewing outside records, reviewing labs and imaging study results as well as the images, patient visit time including getting history and exam,  use if applicable, review of test results with the patient and coming up with a plan in a shared model, counseling patient and family, education and answering patient questions, EMR , EMR diagnosis entry and problem list management, medication reconciliation and prescription management if applicable, paperwork if applicable, printing documents and documentation of the visit activities.)    Malik Del Cid MD  Neurologist  [facility]  Tel:- [phone]    This note was dictated using voice recognition software.  Any grammatical or  context distortions are unintentional and inherent to the software.

## 2022-12-01 NOTE — LETTER
12/1/2022         RE: Roverto Gonzalez  [address]  [facility] [address]        Dear Colleague,    Thank you for referring your patient, Roverto Gonzalez, to the [facility] [place]. Please see a copy of my visit note below.    NEUROLOGY OUTPATIENT PROGRESS NOTE  Dec 1, 2022     CHIEF COMPLAINT/REASON FOR VISIT/REASON FOR CONSULT  Patient presents with:  Follow Up    REASON FOR CONSULTATION- Numbness    REFERRAL SOURCE  Dr. Finkelstein  CC Dr. Finkelstein    HISTORY OF PRESENT ILLNESS  Roverto Gonzalez is a 57 year old female seen today for evaluation of hand numbness.  Reports that the numbness came on in 3 months ago.  All 5 fingers are involved.  Is present on both sides.  The symptoms come and go.  Washing the dishes does make the symptoms come on.  She does have some left-sided weakness as well.  She does have chronic neck pain more on the left side.  Denies any shooting pain down the arm.  She is done physical therapy in the past which is helped the neck pain.  Reports no recent falls or injuries.  Occasionally will have intermittent numbness in her legs here and there.    Patient does have a history of a previous aneurysm that was being managed by Dr. Novoas.  Patient reports no ongoing headaches there is continued some pressure in the left side of her head.  Has not had a repeat scan for several years.  No family history of aneurysm.  No smoking history.    3/16/21  Patient returns today.  Reports that he continues to have hand numbness and hand pain.  This is mainly in the left hand in the fourth and fifth digit.  She continues to have neck pain as well.  Further reports of pressure in her ear.  Reports no headaches.  Reports it is mainly in the year.  She further was identified to have a aneurysm on her MRA.  Discussed significance of this.  Discussed treatment options.  Discussed about routine surveillance.    She has been working with her primary care doctor to get the blood  pressure under good control.  She was having some dizziness due to high blood pressure medication and this has been decreased.  She further reports no's history of aneurysm risk factors.  No family history of aneurysms.  No history of tobacco abuse.  Reports no headaches.    Ear pressure is in the ear.  Further reports tinnitus.  Reports no headaches.    6/16/21  Patient returns today.  Reports that she had to travel to [place] and has not been able to do the testing/therapy that had recommended.      She reports that the numbness in her hands is intermittent.  She has not done any physical therapy.  Feels that these are getting better.  Continues to have pressure in the years.  Is seeing the ENT doctor.  Has not been able to have the appointment so far.    Her MRI has shown an aneurysm in the past.  Reports no headaches.  We further talked about her blood pressure she reports that her blood pressure has been under good control though more recently she is been having a lot of cough.  She feels that she stopped the lisinopril and the cough goes away.  Discussed about side effects of the lisinopril and she would like me to change the medication for her.    12/1/22  Patient returns today  1.  No further numbness in her hands.  2.  Ear pressure is resolved.  Did not see ENT  3.  No symptoms attributable to the aneurysms.  No headaches.  Has been taking the blood pressure medication without any side effects.  No coughing.  Overall has been doing well.  Plans to travel this winter.  MRA did not show any worsening of the aneurysms.  Denies any secondhand smoking exposure.  Is trying to go for walks every day.  No strenuous exercise.    Previous history is reviewed and this is unchanged.    PAST MEDICAL/SURGICAL HISTORY  Past Medical History:   Diagnosis Date     Breast cancer (H) 2014    lumpectomy on left with radiation      Breast cancer (H) 2014    s/p resection and radiation     DCIS (ductal carcinoma in situ) of  "breast 2014    ER-[place]-     GERD (gastroesophageal reflux disease)      Hx of radiation therapy 2014     Hypertension      Vitamin D deficiency      Patient Active Problem List   Diagnosis     Malignant neoplasm of left female breast, unspecified estrogen receptor status, unspecified site of breast (H)     Malignant neoplasm of lower-outer quadrant of female breast (H)     Vitamin D deficiency     Thyroid cyst     Restrictive lung disease     Prediabetes     Sicca syndrome (H)     Mixed hyperlipidemia     Asthma     Benign essential hypertension     Cerebral arterial aneurysm     DCIS (ductal carcinoma in situ)     Helicobacter pylori infection     Obesity (BMI 30.0-34.9)       FAMILY HISTORY  Family History   Problem Relation Age of Onset     Cancer Father      Throat cancer Father          \"I think age 82.\"     Hypertension Mother      Hereditary Breast and Ovarian Cancer Syndrome No family hx of    Negative for neuropathy or aneurysm    SOCIAL HISTORY  Social History     Tobacco Use     Smoking status: Never     Smokeless tobacco: Never   Substance Use Topics     Alcohol use: No     Drug use: No       SYSTEMS REVIEW  Twelve-system ROS was done and other than the HPI this was negative.   No new concerns    MEDICATIONS  albuterol (PROAIR HFA/PROVENTIL HFA/VENTOLIN HFA) 108 (90 Base) MCG/ACT inhaler, INHALE 2 PUFFS INTO THE LUNGS EVERY 6 HOURS AS NEEDED FOR WHEEZING  benzonatate (TESSALON) 200 MG capsule,   FLOVENT HFA 44 MCG/ACT inhaler, INHALE 2 PUFFS BY MOUTH TWICE DAILY  vitamin D2 (ERGOCALCIFEROL) 57189 units (1250 mcg) capsule, TK 1 C PO Q 7 DAYS    No current facility-administered medications on file prior to visit.       PHYSICAL EXAMINATION  VITALS: /76   Pulse 56   Resp 16   Wt 89.8 kg (198 lb)   BMI 33.99 kg/m    GENERAL: Healthy appearing, alert, no acute distress, normal habitus.  CARDIOVASCULAR: Extremities warm and well-perfused.  NEUROLOGICAL:  Patient is awake and oriented to self, place " and time.  Attention span is normal.  Memory is grossly intact.  Language is fluent and follows commands appropriately.  Appropriate fund of knowledge. Cranial nerves 2-12 are intact. There is no pronator drift.  Motor exam shows 5/5 strength in all extremities.  Tone is symmetric bilaterally in upper and lower extremities.  (Previously reflexes are symmetric and 2+ in upper extremities and lower extremities. Sensory exam is grossly intact to light touch, pin prick and vibration. ) Finger to nose and heel to shin is without dysmetria.  Romberg is negative.  Gait is normal and the patient is able to do tandem walk and walk on toes and heels.   Exam unchanged from before.    DIAGNOSTICS  MRI  HEAD MRI:   1.  Stable exam.  2.  No mass, hemorrhage or stroke.  3.  Stable mild/moderate burden of nonspecific white matter T2   prolongation. The appearance is not consistent with demyelination.   Findings may be secondary to chronic small vessel ischemic change.     HEAD MRA:   1.  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 No further follow-up of this finding is recommended.  2.  No intradural aneurysm.  3.  No high-grade stenosis.    OUTSIDE RECORDS  Outside referral notes were reviewed and pertinent information has been summarized;-patient was referred on October 14, 2020.  Was seen by Dr. Naqvi.  Patient was having joint pain.  Had elevated ESR and CRP.  Was also having some paresthesias in the forearm and fingertips.  This is more at night.  She negative Tinel's and Phalen's.  She is also been referred to neurology in the past but she did not pursue that.  Was seen by Dr. Mcclain in the past.  This was in July 2017.  Patient at the time was complaining of some back pain.  Patient did have an MRI which did show an aneurysm.  Patient was recommended to have a repeat scan in 5 years.  She was told to follow-up with her primary care doctor for her chronic back  pain.    EMG     CLINICAL INTERPRETATION:  This is an abnormal nerve conduction and EMG study.  The needle study shows chronic left ulnar denervation but no active denervation findings.  Further clinical correlation is needed.     MRI C spine - images reviewed.  IMPRESSION:   1.  Minimal cervical spondylosis without spinal canal or foraminal stenosis. No spinal cord signal abnormality.    MRA - images personally reviewed  IMPRESSION:   1.  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   2.  Unchanged extradural 1-2 mm proximal cavernous left internal carotid artery aneurysm.  3.  Focal 2 mm outpouching arising medially from the right A1-2 junction likely represents the normal anterior commuting indicating artery rather than a true saccular aneurysm.    MRA 2022-images reviewed.    IMPRESSION:  1.  Overall stable appearance from 02/23/2021.     2.  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     3.  Specifically, stable unruptured 3 mm saccular aneurysm left MCA M1-M2 junction directed inferiorly with no change in size/morphology.     4.  Stable 1 mm laterally-directed aneurysm of the proximal left cavernous ICA segment, unruptured with no change in size or morphology.     5.  Stable lobular prominence of anterior communicate artery or a small 1 mm aneurysm at the level of the right A1-A2 junction as before.     6.  No new or enlarging aneurysms.     7.  Tortuosity of the high right cervical ICA level below the skull base and of the cavernous carotid siphons.     8.  No other changes.    IMPRESSION/REPORT/PLAN  Hand numbness with neck pain  Cerebral aneurysm, nonruptured  Pressure in ear  Hypertension    This is a 57 year old female with new onset of numbness in the hands.  MRI cervical spine was noncontributory.  EMG does show evidence of chronic left ulnar denervation though no active entrapment neuropathy.  Have improved on their own.   Previously physical therapy has been recommended.  Currently this is not bothering her.    Her MRI previously had shown a left MCA extradural cavernous ICA aneurysm.  Repeat MRI has shown multiple anterior circulation aneurysms.  There continues to be a left MCA M1 M2 aneurysm, left cavernous ICA aneurysm, right A1 A2 junction aneurysm.  There is some tortuosity of the high right cervical ICA as well.  Overall scan is stable and we will repeat a scan in 1 year.  Discussed risk factors for aneurysms.  There is no family history smoking history though she does have a diagnosis of hypertension.    She has been having coughing with lisinopril.  Discussed blood pressure goal would be less than 120.  She is now on losartan without side effects.  Encouraged her to check her blood pressure at home to see if the dose of the losartan needs to be increased.    Return in 1 year with MRA    -     MRA Head w/o Contrast Angiogram; Future  -     losartan (COZAAR) 25 MG tablet; TAKE 1 TABLET(25 MG) BY MOUTH DAILY. FOLLOW UP AS NEEDED FOR REFILLS** Strength: 25 mg    Return in about 1 year (around 12/1/2023) for In-Clinic Visit (must).    Over 31 minutes were spent coordinating the care for the patient on the day of the encounter.  This includes previsit, during visit and post visit activities as documented above.  Counseling patient.  Reviewing MRI.  MRI ordered.  Multiple problems addressed.  Prescription management.  (Activities include but not inclusive of reviewing chart, reviewing outside records, reviewing labs and imaging study results as well as the images, patient visit time including getting history and exam,  use if applicable, review of test results with the patient and coming up with a plan in a shared model, counseling patient and family, education and answering patient questions, EMR , EMR diagnosis entry and problem list management, medication reconciliation and prescription management if  applicable, paperwork if applicable, printing documents and documentation of the visit activities.)    Malik Del Cid MD  Neurologist  [facility]  Tel:- [phone]    This note was dictated using voice recognition software.  Any grammatical or context distortions are unintentional and inherent to the software.        Again, thank you for allowing me to participate in the care of your patient.        Sincerely,        Malik Del Cid MD

## 2022-12-08 ENCOUNTER — OFFICE VISIT (OUTPATIENT)
Dept: FAMILY MEDICINE | Facility: CLINIC | Age: 57
End: 2022-12-08
Payer: COMMERCIAL

## 2022-12-08 VITALS
OXYGEN SATURATION: 100 % | WEIGHT: 193.6 LBS | SYSTOLIC BLOOD PRESSURE: 132 MMHG | HEART RATE: 62 BPM | HEIGHT: 64 IN | BODY MASS INDEX: 33.05 KG/M2 | RESPIRATION RATE: 20 BRPM | TEMPERATURE: 97.7 F | DIASTOLIC BLOOD PRESSURE: 70 MMHG

## 2022-12-08 DIAGNOSIS — R00.2 PALPITATIONS: Primary | ICD-10-CM

## 2022-12-08 LAB
ERYTHROCYTE [DISTWIDTH] IN BLOOD BY AUTOMATED COUNT: 13.2 % (ref 10–15)
HCT VFR BLD AUTO: 40.2 % (ref 35–47)
HGB BLD-MCNC: 13 G/DL (ref 11.7–15.7)
MCH RBC QN AUTO: 27.8 PG (ref 26.5–33)
MCHC RBC AUTO-ENTMCNC: 32.3 G/DL (ref 31.5–36.5)
MCV RBC AUTO: 86 FL (ref 78–100)
PLATELET # BLD AUTO: 198 10E3/UL (ref 150–450)
RBC # BLD AUTO: 4.67 10E6/UL (ref 3.8–5.2)
WBC # BLD AUTO: 5.2 10E3/UL (ref 4–11)

## 2022-12-08 PROCEDURE — 93000 ELECTROCARDIOGRAM COMPLETE: CPT | Performed by: FAMILY MEDICINE

## 2022-12-08 PROCEDURE — 90682 RIV4 VACC RECOMBINANT DNA IM: CPT | Performed by: FAMILY MEDICINE

## 2022-12-08 PROCEDURE — 36415 COLL VENOUS BLD VENIPUNCTURE: CPT | Performed by: FAMILY MEDICINE

## 2022-12-08 PROCEDURE — 84443 ASSAY THYROID STIM HORMONE: CPT | Performed by: FAMILY MEDICINE

## 2022-12-08 PROCEDURE — 85027 COMPLETE CBC AUTOMATED: CPT | Performed by: FAMILY MEDICINE

## 2022-12-08 PROCEDURE — 90471 IMMUNIZATION ADMIN: CPT | Performed by: FAMILY MEDICINE

## 2022-12-08 PROCEDURE — 80048 BASIC METABOLIC PNL TOTAL CA: CPT | Performed by: FAMILY MEDICINE

## 2022-12-08 PROCEDURE — 99215 OFFICE O/P EST HI 40 MIN: CPT | Mod: 25 | Performed by: FAMILY MEDICINE

## 2022-12-08 ASSESSMENT — ASTHMA QUESTIONNAIRES
QUESTION_3 LAST FOUR WEEKS HOW OFTEN DID YOUR ASTHMA SYMPTOMS (WHEEZING, COUGHING, SHORTNESS OF BREATH, CHEST TIGHTNESS OR PAIN) WAKE YOU UP AT NIGHT OR EARLIER THAN USUAL IN THE MORNING: ONCE OR TWICE
QUESTION_1 LAST FOUR WEEKS HOW MUCH OF THE TIME DID YOUR ASTHMA KEEP YOU FROM GETTING AS MUCH DONE AT WORK, SCHOOL OR AT HOME: NONE OF THE TIME
QUESTION_2 LAST FOUR WEEKS HOW OFTEN HAVE YOU HAD SHORTNESS OF BREATH: MORE THAN ONCE A DAY
ACT_TOTALSCORE: 16
QUESTION_4 LAST FOUR WEEKS HOW OFTEN HAVE YOU USED YOUR RESCUE INHALER OR NEBULIZER MEDICATION (SUCH AS ALBUTEROL): TWO OR THREE TIMES PER WEEK
ACT_TOTALSCORE: 16
QUESTION_5 LAST FOUR WEEKS HOW WOULD YOU RATE YOUR ASTHMA CONTROL: SOMEWHAT CONTROLLED

## 2022-12-08 ASSESSMENT — PAIN SCALES - GENERAL: PAINLEVEL: NO PAIN (0)

## 2022-12-08 NOTE — PROGRESS NOTES
"  Assessment & Plan     Palpitations    We will do routine work-up below for palpitations.    - TSH with free T4 reflex; Future  - CBC with platelets; Future  - Basic metabolic panel  (Ca, Cl, CO2, Creat, Gluc, K, Na, BUN); Future  - EKG 12-lead complete w/read - Clinics  - Basic metabolic panel  (Ca, Cl, CO2, Creat, Gluc, K, Na, BUN)  - CBC with platelets  - TSH with free T4 reflex      40 minutes spent on the date of the encounter doing chart review, history and exam, documentation and further activities per the note       BMI:   Estimated body mass index is 33.23 kg/m  as calculated from the following:    Height as of this encounter: 1.626 m (5' 4\").    Weight as of this encounter: 87.8 kg (193 lb 9.6 oz).         No follow-ups on file.    Sabina DO [place]  [facility]    Marek Layne is a 57 year old, presenting for the following health issues:  Heart Problem      Heart Problem    History of Present Illness       Reason for visit:  My conley    She eats 0-1 servings of fruits and vegetables daily.She consumes 0 sweetened beverage(s) daily.She exercises with enough effort to increase her heart rate 9 or less minutes per day.    She is taking medications regularly.       For the past week now has noticed some tachycardia- doesn't happen all the time- sometimes headache and heartburn, but denies any chest pain or shortness of breath. Not sure if its her heart or GERD.  It skips a beat at times also.  She noticed it a week ago while standing there washing dishes and then laid down to go to sleep.  It didn't affect her sleep.  It is occurring every 2 days or so.  It lasts a few minutes.         Hyperlipidemia Follow-Up      Are you regularly taking any medication or supplement to lower your cholesterol?   No    Are you having muscle aches or other side effects that you think could be caused by your cholesterol lowering medication?  No   LDL Cholesterol Calculated   Date Value Ref Range " "Status   02/16/2022 168 (H) <=129 mg/dL Final             Hypertension Follow-up      Do you check your blood pressure regularly outside of the clinic? No     Are you following a low salt diet? Yes    Are your blood pressures ever more than 140 on the top number (systolic) OR more   than 90 on the bottom number (diastolic), for example 140/90? No   Losartan 25 mg daily    Asthma Follow-Up    Was ACT completed today?  Flovent and albuterol.  She was ill twice in the last 8 weeks.  She thinks it was the flu   Yes    ACT Total Scores 12/8/2022   ACT TOTAL SCORE (Goal Greater than or Equal to 20) 16   In the past 12 months, how many times did you visit the emergency room for your asthma without being admitted to the hospital? 0   In the past 12 months, how many times were you hospitalized overnight because of your asthma? 0       How many days per week do you miss taking your asthma controller medication?  0    Please describe any recent triggers for your asthma: cold air    Have you had any Emergency Room Visits, Urgent Care Visits, or Hospital Admissions since your last office visit?  No        Review of Systems   Constitutional, HEENT, cardiovascular, pulmonary, gi and gu systems are negative, except as otherwise noted.      Objective    /70 (BP Location: Right arm, Patient Position: Sitting, Cuff Size: Adult Large)   Pulse 62   Temp 97.7  F (36.5  C) (Oral)   Resp 20   Ht 1.626 m (5' 4\")   Wt 87.8 kg (193 lb 9.6 oz)   SpO2 100%   BMI 33.23 kg/m    Body mass index is 33.23 kg/m .  Physical Exam   GENERAL: healthy, alert and no distress  NECK: no adenopathy, no asymmetry, masses, or scars and thyroid normal to palpation  RESP: lungs clear to auscultation - no rales, rhonchi or wheezes  CV: regular rate and rhythm, normal S1 S2, no S3 or S4, no murmur, click or rub, no peripheral edema and peripheral pulses strong  MS: no gross musculoskeletal defects noted, no edema  PSYCH: mentation appears normal, " affect normal/bright

## 2022-12-09 LAB
ANION GAP SERPL CALCULATED.3IONS-SCNC: 13 MMOL/L (ref 7–15)
BUN SERPL-MCNC: 7.3 MG/DL (ref 8–23)
CALCIUM SERPL-MCNC: 9.5 MG/DL (ref 8.6–10)
CHLORIDE SERPL-SCNC: 104 MMOL/L (ref 98–107)
CREAT SERPL-MCNC: 0.68 MG/DL (ref 0.51–0.95)
DEPRECATED HCO3 PLAS-SCNC: 26 MMOL/L (ref 22–29)
GFR SERPL CREATININE-BSD FRML MDRD: >90 ML/MIN/1.73M2
GLUCOSE SERPL-MCNC: 117 MG/DL (ref 70–99)
POTASSIUM SERPL-SCNC: 3.8 MMOL/L (ref 3.4–5.3)
SODIUM SERPL-SCNC: 143 MMOL/L (ref 136–145)
TSH SERPL DL<=0.005 MIU/L-ACNC: 2.66 UIU/ML (ref 0.3–4.2)

## 2023-01-07 DIAGNOSIS — J98.4 RESTRICTIVE LUNG DISEASE: ICD-10-CM

## 2023-01-08 NOTE — TELEPHONE ENCOUNTER
"Routing refill request to provider for review/approval because:  Asthma control assessment score    Last Written Prescription Date:  3/15/2022  Last Fill Quantity: 10.6g,  # refills: 4   Last office visit provider:  12/8/2022     Requested Prescriptions   Pending Prescriptions Disp Refills     FLOVENT HFA 44 MCG/ACT inhaler [Pharmacy Med Name: FLOVENT HFA 44MCG ORAL INH 120INH] 10.6 g 4     Sig: INHALE 2 PUFFS BY MOUTH TWICE DAILY       Inhaled Steroids Protocol Failed - 1/7/2023  4:46 PM        Failed - Asthma control assessment score within normal limits in last 6 months     Please review ACT score.           Failed - Recent (6 mo) or future (30 days) visit within the authorizing provider's specialty     Patient had office visit in the last 6 months or has a visit in the next 30 days with authorizing provider or within the authorizing provider's specialty.  See \"Patient Info\" tab in inbasket, or \"Choose Columns\" in Meds & Orders section of the refill encounter.            Passed - Patient is age 12 or older        Passed - Medication is active on med list             Dee Browning RN 01/07/23 10:03 PM  "

## 2023-01-09 RX ORDER — FLUTICASONE PROPIONATE 44 MCG
AEROSOL WITH ADAPTER (GRAM) INHALATION
Qty: 10.6 G | Refills: 0 | Status: SHIPPED | OUTPATIENT
Start: 2023-01-09 | End: 2023-02-10

## 2023-01-09 NOTE — TELEPHONE ENCOUNTER
Contacted Roverto on 01/09/23 and left a message. If patient calls back please relay message and help schedule an appt .

## 2023-01-09 NOTE — TELEPHONE ENCOUNTER
Patient needs physical for another refill. I f would like me to refill in the future should be with me. If would like to stay with Anisha should be with her,    El Ballard MD

## 2023-01-17 DIAGNOSIS — J45.909 ASTHMA: Primary | ICD-10-CM

## 2023-01-26 DIAGNOSIS — I10 HYPERTENSION, UNSPECIFIED TYPE: ICD-10-CM

## 2023-01-30 RX ORDER — LOSARTAN POTASSIUM 25 MG/1
TABLET ORAL
Qty: 90 TABLET | Refills: 3 | Status: SHIPPED | OUTPATIENT
Start: 2023-01-30 | End: 2023-02-10

## 2023-01-30 NOTE — TELEPHONE ENCOUNTER
Refill request for: Losartan    Directions: Take 1 tab by mouth daily     LOV: 12/01/22  NOV: Due 12/01/23 - not scheduled yet     90 day supply with 3 refills Medication T'd for review and signature

## 2023-02-10 ENCOUNTER — OFFICE VISIT (OUTPATIENT)
Dept: FAMILY MEDICINE | Facility: CLINIC | Age: 58
End: 2023-02-10
Payer: COMMERCIAL

## 2023-02-10 VITALS
TEMPERATURE: 98.4 F | BODY MASS INDEX: 33.86 KG/M2 | RESPIRATION RATE: 20 BRPM | SYSTOLIC BLOOD PRESSURE: 133 MMHG | WEIGHT: 198.3 LBS | HEIGHT: 64 IN | OXYGEN SATURATION: 100 % | HEART RATE: 57 BPM | DIASTOLIC BLOOD PRESSURE: 64 MMHG

## 2023-02-10 DIAGNOSIS — E55.9 VITAMIN D DEFICIENCY: ICD-10-CM

## 2023-02-10 DIAGNOSIS — Z79.899 MEDICATION MANAGEMENT: ICD-10-CM

## 2023-02-10 DIAGNOSIS — I10 BENIGN ESSENTIAL HYPERTENSION: ICD-10-CM

## 2023-02-10 DIAGNOSIS — Z13.220 LIPID SCREENING: ICD-10-CM

## 2023-02-10 DIAGNOSIS — M35.00 SICCA SYNDROME (H): ICD-10-CM

## 2023-02-10 DIAGNOSIS — I67.1 CEREBRAL ARTERIAL ANEURYSM: ICD-10-CM

## 2023-02-10 DIAGNOSIS — Z23 HIGH PRIORITY FOR 2019-NCOV VACCINE: ICD-10-CM

## 2023-02-10 DIAGNOSIS — R06.09 DYSPNEA ON EXERTION: ICD-10-CM

## 2023-02-10 DIAGNOSIS — R00.2 PALPITATIONS: ICD-10-CM

## 2023-02-10 DIAGNOSIS — E66.811 OBESITY (BMI 30.0-34.9): ICD-10-CM

## 2023-02-10 DIAGNOSIS — C50.512 MALIGNANT NEOPLASM OF LOWER-OUTER QUADRANT OF LEFT FEMALE BREAST, UNSPECIFIED ESTROGEN RECEPTOR STATUS (H): ICD-10-CM

## 2023-02-10 DIAGNOSIS — J45.41 MODERATE PERSISTENT ASTHMA WITH ACUTE EXACERBATION: ICD-10-CM

## 2023-02-10 DIAGNOSIS — M54.6 CHRONIC BILATERAL THORACIC BACK PAIN: ICD-10-CM

## 2023-02-10 DIAGNOSIS — Z00.00 ENCOUNTER FOR ROUTINE HISTORY AND PHYSICAL EXAM IN FEMALE: Primary | ICD-10-CM

## 2023-02-10 DIAGNOSIS — G89.29 CHRONIC BILATERAL THORACIC BACK PAIN: ICD-10-CM

## 2023-02-10 DIAGNOSIS — R73.03 PREDIABETES: ICD-10-CM

## 2023-02-10 LAB
ALBUMIN SERPL BCG-MCNC: 4.2 G/DL (ref 3.5–5.2)
ALBUMIN UR-MCNC: NEGATIVE MG/DL
ALP SERPL-CCNC: 84 U/L (ref 35–104)
ALT SERPL W P-5'-P-CCNC: 53 U/L (ref 10–35)
APPEARANCE UR: CLEAR
AST SERPL W P-5'-P-CCNC: 33 U/L (ref 10–35)
BILIRUB DIRECT SERPL-MCNC: <0.2 MG/DL (ref 0–0.3)
BILIRUB SERPL-MCNC: 0.3 MG/DL
BILIRUB UR QL STRIP: NEGATIVE
CHOLEST SERPL-MCNC: 247 MG/DL
COLOR UR AUTO: YELLOW
GLUCOSE UR STRIP-MCNC: NEGATIVE MG/DL
HBA1C MFR BLD: 6.1 % (ref 0–5.6)
HDLC SERPL-MCNC: 69 MG/DL
HGB UR QL STRIP: NEGATIVE
KETONES UR STRIP-MCNC: NEGATIVE MG/DL
LDLC SERPL CALC-MCNC: 163 MG/DL
LEUKOCYTE ESTERASE UR QL STRIP: NEGATIVE
NITRATE UR QL: NEGATIVE
NONHDLC SERPL-MCNC: 178 MG/DL
PH UR STRIP: 6 [PH] (ref 5–8)
PROT SERPL-MCNC: 7.8 G/DL (ref 6.4–8.3)
SP GR UR STRIP: 1.02 (ref 1–1.03)
TRIGL SERPL-MCNC: 77 MG/DL
UROBILINOGEN UR STRIP-ACNC: 0.2 E.U./DL

## 2023-02-10 PROCEDURE — 99396 PREV VISIT EST AGE 40-64: CPT | Performed by: NURSE PRACTITIONER

## 2023-02-10 PROCEDURE — 80076 HEPATIC FUNCTION PANEL: CPT | Performed by: NURSE PRACTITIONER

## 2023-02-10 PROCEDURE — 36415 COLL VENOUS BLD VENIPUNCTURE: CPT | Performed by: NURSE PRACTITIONER

## 2023-02-10 PROCEDURE — 83036 HEMOGLOBIN GLYCOSYLATED A1C: CPT | Performed by: NURSE PRACTITIONER

## 2023-02-10 PROCEDURE — 91313 COVID-19 VACCINE BIVALENT BOOSTER 18+ (MODERNA): CPT | Performed by: NURSE PRACTITIONER

## 2023-02-10 PROCEDURE — 81003 URINALYSIS AUTO W/O SCOPE: CPT | Performed by: NURSE PRACTITIONER

## 2023-02-10 PROCEDURE — 82306 VITAMIN D 25 HYDROXY: CPT | Performed by: NURSE PRACTITIONER

## 2023-02-10 PROCEDURE — 80061 LIPID PANEL: CPT | Performed by: NURSE PRACTITIONER

## 2023-02-10 PROCEDURE — 0134A COVID-19 VACCINE BIVALENT BOOSTER 18+ (MODERNA): CPT | Performed by: NURSE PRACTITIONER

## 2023-02-10 PROCEDURE — 83880 ASSAY OF NATRIURETIC PEPTIDE: CPT | Performed by: NURSE PRACTITIONER

## 2023-02-10 PROCEDURE — 99214 OFFICE O/P EST MOD 30 MIN: CPT | Mod: 25 | Performed by: NURSE PRACTITIONER

## 2023-02-10 RX ORDER — FLUTICASONE PROPIONATE 110 UG/1
2 AEROSOL, METERED RESPIRATORY (INHALATION) 2 TIMES DAILY
Qty: 12 G | Refills: 11 | Status: SHIPPED | OUTPATIENT
Start: 2023-02-10

## 2023-02-10 RX ORDER — METHOCARBAMOL 500 MG/1
500 TABLET, FILM COATED ORAL 3 TIMES DAILY PRN
Qty: 30 TABLET | Refills: 0 | Status: SHIPPED | OUTPATIENT
Start: 2023-02-10

## 2023-02-10 RX ORDER — LOSARTAN POTASSIUM 25 MG/1
TABLET ORAL
Qty: 90 TABLET | Refills: 3 | Status: SHIPPED | OUTPATIENT
Start: 2023-02-10 | End: 2024-02-13

## 2023-02-10 RX ORDER — ALBUTEROL SULFATE 90 UG/1
2 AEROSOL, METERED RESPIRATORY (INHALATION) EVERY 6 HOURS PRN
Qty: 18 G | Refills: 3 | Status: SHIPPED | OUTPATIENT
Start: 2023-02-10

## 2023-02-10 ASSESSMENT — ENCOUNTER SYMPTOMS
ARTHRALGIAS: 1
ABDOMINAL PAIN: 0
SHORTNESS OF BREATH: 1
HEMATOCHEZIA: 0
EYE PAIN: 1
HEMATURIA: 0
NAUSEA: 0
PALPITATIONS: 1

## 2023-02-10 ASSESSMENT — ASTHMA QUESTIONNAIRES
QUESTION_3 LAST FOUR WEEKS HOW OFTEN DID YOUR ASTHMA SYMPTOMS (WHEEZING, COUGHING, SHORTNESS OF BREATH, CHEST TIGHTNESS OR PAIN) WAKE YOU UP AT NIGHT OR EARLIER THAN USUAL IN THE MORNING: ONCE OR TWICE
ACT_TOTALSCORE: 17
QUESTION_4 LAST FOUR WEEKS HOW OFTEN HAVE YOU USED YOUR RESCUE INHALER OR NEBULIZER MEDICATION (SUCH AS ALBUTEROL): ONE OR TWO TIMES PER DAY
QUESTION_5 LAST FOUR WEEKS HOW WOULD YOU RATE YOUR ASTHMA CONTROL: COMPLETELY CONTROLLED
QUESTION_1 LAST FOUR WEEKS HOW MUCH OF THE TIME DID YOUR ASTHMA KEEP YOU FROM GETTING AS MUCH DONE AT WORK, SCHOOL OR AT HOME: NONE OF THE TIME
ACT_TOTALSCORE: 17
QUESTION_2 LAST FOUR WEEKS HOW OFTEN HAVE YOU HAD SHORTNESS OF BREATH: MORE THAN ONCE A DAY

## 2023-02-10 ASSESSMENT — PAIN SCALES - GENERAL: PAINLEVEL: SEVERE PAIN (6)

## 2023-02-10 NOTE — PROGRESS NOTES
Assessment and Plan:    Encounter for routine history and physical exam in female  Recommend consuming a healthy diet and exercising.  She is up-to-date on breast cancer and colorectal cancer screening.  Patient will follow-up next week for pneumococcal vaccine.  She declined shingles vaccine.  - UA Macro with Reflex to Micro and Culture - lab collect  - PNEUMOCOCCAL 20 VALENT CONJUGATE (PREVNAR 20)    High priority for 2019-nCoV vaccine  - COVID-19,PF,MODERNA BIVALENT 18+Yrs    Lipid screening  - Lipid panel reflex to direct LDL Fasting  - Lipid panel reflex to direct LDL Fasting    Chronic bilateral thoracic back pain  Differentials include myofascial pain, bulging or herniated disc.  Will refer to PT for further evaluation and treatment.  Provided prescription for methocarbamol to take as needed.  She is to avoid taking this with other sedatives.  - Physical Therapy Referral  - methocarbamol (ROBAXIN) 500 MG tablet  Dispense: 30 tablet; Refill: 0    Prediabetes  - Hemoglobin A1c  - Hemoglobin A1c    Vitamin D deficiency  We will notify patient of vitamin D results.  - Vitamin D Deficiency  - Vitamin D Deficiency    Sicca syndrome (H)  Patient requests referral to new rheumatologist.  - Adult Rheumatology  Referral    Palpitations  Will refer to cardiology for further evaluation.  Hemogram, BMP, and thyroid labs were normal in October.  - Adult Cardiology Eval  Referral    Malignant neoplasm of lower-outer quadrant of left female breast, unspecified estrogen receptor status (H)  She is in remission.    Benign essential hypertension  Patient continues losartan 25 mg daily.  This is suboptimal.  I encouraged her to monitor blood pressure outside the clinic.  - fluticasone (FLOVENT HFA) 110 MCG/ACT inhaler  Dispense: 12 g; Refill: 11    Cerebral arterial aneurysm  Patient is seeing neurology.    Obesity (BMI 30.0-34.9)  Recommend consuming a healthy diet and exercising.  This is contributing to  hypertension.    Moderate persistent asthma with acute exacerbation  We will increase Flovent to 110 mcg twice daily.  She will continue albuterol as needed.  Recommend close follow-up if symptoms persist.    Medication management  - Hepatic panel (Albumin, ALT, AST, Bili, Alk Phos, TP)  - Hepatic panel (Albumin, ALT, AST, Bili, Alk Phos, TP)      Subjective:     Roverto is a 58 year old female presenting to the clinic for a female physical.     LMP: 7 years ago  Hx of abnormal pap smear: none  Last pap smear: 10/20/20 normal, negative HPV   Perform self-breast exams: yes   Vaginal discharge or irritation: none   Sexually active: yes,  for 27 years  Contraception: none   Concerns for STDs: none   Previous pregnancies:four pregnancies (one miscarriage and 3 vaginal deliveries)     Patient sees rheumatology and has been diagnosed with sicca syndrome in the past.    She has joint pain within her fingers.  She occasionally has numbness within her fifth finger.  Patient has been experiencing thoracic back pain for multiple years.  She saw a spine specialist in the past.  Physical therapy provided assistance.  Pain is intermittent and worsens with activity.     Patient has moderate persistent asthma.  She states her asthma symptoms have worsened recently with the weather changes.  I tried to clarify which inhaler she is using.  She believes she is using her Flovent twice daily and she does not have any albuterol.     Patient has hypertension and is taking losartan 25 mg daily.  She has been seeing neurology due to multiple brain aneurysms which are being monitored on a yearly basis.    Patient was seen in urgent care in October due to palpitations.  She feels as though her heart races at bedtime.  She experiences shortness of breath.  She denies chest pain or chest tightness.  She has not traveled recently, but plans on traveling within the next few weeks.  She denies any personal or family history of blood  "clots.    Review of systems:  I performed a 10 point review of systems.  All pertinent positives and negatives are noted in the HPI. All others are negative.     No Known Allergies    Current Outpatient Medications   Medication     albuterol (PROAIR HFA/PROVENTIL HFA/VENTOLIN HFA) 108 (90 Base) MCG/ACT inhaler     losartan (COZAAR) 25 MG tablet     FLOVENT HFA 44 MCG/ACT inhaler     No current facility-administered medications for this visit.       Social History     Socioeconomic History     Marital status:      Spouse name: Not on file     Number of children: Not on file     Years of education: Not on file     Highest education level: Not on file   Occupational History     Not on file   Tobacco Use     Smoking status: Never     Smokeless tobacco: Never   Vaping Use     Vaping Use: Never used   Substance and Sexual Activity     Alcohol use: No     Drug use: No     Sexual activity: Yes     Partners: Male   Other Topics Concern     Parent/sibling w/ CABG, MI or angioplasty before 65F 55M? Not Asked   Social History Narrative     Not on file     Social Determinants of Health     Financial Resource Strain: Not on file   Food Insecurity: Not on file   Transportation Needs: Not on file   Physical Activity: Not on file   Stress: Not on file   Social Connections: Not on file   Intimate Partner Violence: Not on file   Housing Stability: Not on file       Past Medical History:   Diagnosis Date     Breast cancer (H)     lumpectomy on left with radiation      Breast cancer (H) 2014    s/p resection and radiation     DCIS (ductal carcinoma in situ) of breast 2014    ER-ND-     GERD (gastroesophageal reflux disease)      Hx of radiation therapy 2014     Hypertension      Vitamin D deficiency        Family History   Problem Relation Age of Onset     Cancer Father      Throat cancer Father          \"I think age 82.\"     Hypertension Mother      Hereditary Breast and Ovarian Cancer Syndrome No family hx of  " "      Past Surgical History:   Procedure Laterality Date     BIOPSY BREAST Left 2014     LUMPECTOMY BREAST      breast left     LUMPECTOMY BREAST Left 6/14     US ASPIRATION OF SEROMA/HEMATOMA/ABSCESS/CYST Right 2017       Objective:     /64 (BP Location: Left arm, Patient Position: Sitting, Cuff Size: Adult Regular)   Pulse 57   Temp 98.4  F (36.9  C) (Oral)   Resp 20   Ht 1.626 m (5' 4.02\")   Wt 89.9 kg (198 lb 4.8 oz)   SpO2 100%   BMI 34.02 kg/m      Patient is alert, no obvious distress.   Skin: Warm, dry.  No rashes or lesions. Skin turgor rapid return.   HEENT:  Eyes normal.  Ears normal.  Nose patent, mucosa pink.  Oropharynx mucosa pink, no lesions or tonsil enlargement.   Neck:  Supple, without lymphadenopathy, bruits, JVD. Thyroid normal texture and size.    Lungs:  Clear to auscultation.  No wheezing, rales noted.  Respirations even and unlabored.   Heart:  Regular rate and rhythm.  No murmurs.   Breasts:  Normal.  No surrounding adenopathy.   Abdomen: Soft, nontender.  No organomegaly.  Bowel sounds normoactive.  No guarding or masses noted.   :  deferred.   Musculoskeletal:  Full ROM of extremities.  Muscle strength equal +5/5.   Neurological:  Cranial nerves 2-12 intact.             Answers for HPI/ROS submitted by the patient on 2/10/2023  Frequency of exercise:: None  Getting at least 3 servings of Calcium per day:: NO  Diet:: Low salt, Low fat/cholesterol  Taking medications regularly:: Yes  Medication side effects:: Not applicable  Bi-annual eye exam:: Yes  Dental care twice a year:: NO  Sleep apnea or symptoms of sleep apnea:: None  abdominal pain: No  Blood in stool: No  Blood in urine: No  chest pain: No  congestion: Yes  eye pain: Yes  arthralgias: Yes  nausea: No  palpitations: Yes  shortness of breath: Yes  pelvic pain: No  vaginal bleeding: No  vaginal discharge: No  tenderness: Yes  Additional concerns today:: No      "

## 2023-02-11 LAB — DEPRECATED CALCIDIOL+CALCIFEROL SERPL-MC: 34 UG/L (ref 20–75)

## 2023-02-13 ENCOUNTER — OFFICE VISIT (OUTPATIENT)
Dept: CARDIOLOGY | Facility: CLINIC | Age: 58
End: 2023-02-13
Payer: COMMERCIAL

## 2023-02-13 VITALS
HEIGHT: 62 IN | SYSTOLIC BLOOD PRESSURE: 133 MMHG | DIASTOLIC BLOOD PRESSURE: 84 MMHG | HEART RATE: 52 BPM | BODY MASS INDEX: 36.38 KG/M2 | WEIGHT: 197.7 LBS

## 2023-02-13 DIAGNOSIS — E66.01 MORBID OBESITY (H): ICD-10-CM

## 2023-02-13 DIAGNOSIS — R00.2 PALPITATIONS: ICD-10-CM

## 2023-02-13 DIAGNOSIS — R06.09 DYSPNEA ON EXERTION: Primary | ICD-10-CM

## 2023-02-13 LAB — NT-PROBNP SERPL-MCNC: 42 PG/ML (ref 0–900)

## 2023-02-13 PROCEDURE — 99204 OFFICE O/P NEW MOD 45 MIN: CPT | Performed by: INTERNAL MEDICINE

## 2023-02-13 NOTE — LETTER
2/13/2023    BOBY Alvarado CNP  [address]  [facility] [address]    RE: Roverto Gonzalez       Dear Colleague,     I had the pleasure of seeing Roverto Gonzalez in the [facility].      Cardiology Clinic Consultation:    February 13, 2023   Patient Name: Roverto Gonzalez  Patient MRN: [id number]    Consult indication: palpitations, COLON    HPI:    I had the opportunity to see patient Roverto Gonzalez in cardiology clinic for a consultation. Patient is followed by our colleague BOBY Alvarado CNP with Primary Care.     Patient is a pleasant 58-year-old female with a past medical history significant for obesity, asthma, restrictive lung disease, breast cancer status post radiation therapy, dyslipidemia, who presents for further evaluation management of dyspnea on exertion and palpitations.    Patient reports that over the past couple of months she has had more noticeable dyspnea on exertion.  Activities such as climbing stairs has resulted in more dyspnea, symptoms have been stable since then.  She denies any chest pain, chest pressure, abnormal lower extremity swelling, symptoms of orthopnea/PND.  Patient also reports experiencing intermittent palpitations, described as a rapid heartbeat sensation, lasting for few seconds to a couple of minutes at a time.  These episodes are sporadic, nonexertional in nature.  She denies any significant associated dizziness/lightheadedness, no presyncope/syncope.  Patient is also been experiencing some dyspnea when lying on her left side, which she has been doing due to some back pain which limits her ability to sleep on her right side, which she had been doing in the past.  Assessment has included an ECG 12/8/2022 that demonstrates normal sinus rhythm at 60 bpm, RSR prime pattern, voltage criteria for LVH.  No acute ischemic changes.  Blood pressure today in clinic 133 over 84 mmHg, she states that her blood pressure at home is generally in the 130 mmHg  "over 70-80 millimeters mercury range when she checks it at a local drugstore.  Reviewed last cholesterol labs notable for .  TSH normal, BMP unremarkable, CBC normal.    Patient reports that she does not exercise regularly, however she is active for most of the day.  She does not smoke cigarettes, does not drink alcohol regularly.  She drinks about a cup of coffee a day.  She also endorses that she likely does not consume enough fluids throughout the day.  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    Last echocardiogram 2/10/2016 from outside hospital report noted normal biventricular function, no significant valvular abnormalities.     Assessment and Plan/Recommendations:    # Palpitations, suspect PACs/PVCs  # Dyspnea on exertion, etiology unclear, has baseline asthma and restrictive lung disease, symptoms not classically characteristic of myocardial ischemia, however she does have risk factors for CAD including age, obesity, sedentary lifestyle, dyslipidemia.  She also received radiation for breast cancer, though she states that she was told this would not increase her risk of cardiac complications, and from what she describes, it seems that this was directed more laterally away from the heart.  Euvolemic on exam.  # HTN, BP borderline elevated    - Holter monitor  - ECG treadmill stress test  - TTE  - We will add on an NT proBNP to labs from a couple of days ago  - Continue losartan 25 mg daily, pending the results of the Holter monitor, may start low-dose beta-blocker therapy (discussed caution given asthma), versus up titration of losartan for improved blood pressure control  - Encouraged regular aerobic exercise, heart healthy \"Mediterranean\" diet for dyslipidemia, does not meet criteria for initiation of statin therapy  - Patient is planning to go to [place] for about a month, recommend follow-up with cardiology JORGITO when she returns, it the aforementioned diagnostic studies are reassuring, " further scheduled follow-up in cardiology clinic would not be required, advise further evaluation/management for possible pulmonary etiology of her symptoms with PCP/Pulmonology    Thank you for allowing our team to participate in the care of Roverto Gonzalez.  Please do not hesitate to call or page me with any questions or concerns.    Sincerely,     Kendrick Kenyon MD, [facility]  Cardiology  February 13, 2023    cc  Anisha Ramirez, APRN CNP  [address] [facility] [address]  [facility] [address]  [place] [address]        Past Medical History:     The 10-year ASCVD risk score (Laura BRENNER, et al., 2019) is: 4.8%    Values used to calculate the score:      Age: 58 years      Sex: Female      Is Non- : No      Diabetic: No      Tobacco smoker: No      Systolic Blood Pressure: 133 mmHg      Is BP treated: Yes      HDL Cholesterol: 69 mg/dL      Total Cholesterol: 247 mg/dL  Patient Active Problem List   Diagnosis     Malignant neoplasm of left female breast, unspecified estrogen receptor status, unspecified site of breast (H)     Malignant neoplasm of lower-outer quadrant of female breast (H)     Vitamin D deficiency     Thyroid cyst     Restrictive lung disease     Prediabetes     Sicca syndrome (H)     Mixed hyperlipidemia     Asthma     Benign essential hypertension     Cerebral arterial aneurysm     DCIS (ductal carcinoma in situ)     Helicobacter pylori infection     Obesity (BMI 30.0-34.9)       Past Surgical History:   Past Surgical History:   Procedure Laterality Date     BIOPSY BREAST Left 2014     LUMPECTOMY BREAST      breast left     LUMPECTOMY BREAST Left 6/14     US ASPIRATION OF SEROMA/HEMATOMA/ABSCESS/CYST Right 2017       Medications (outpatient):  Current Outpatient Medications   Medication Sig Dispense Refill     albuterol (PROAIR HFA/PROVENTIL HFA/VENTOLIN HFA) 108 (90 Base) MCG/ACT inhaler Inhale 2 puffs into the lungs every 6 hours as needed for wheezing 18 g 3     fluticasone (FLOVENT HFA) 110  "MCG/ACT inhaler Inhale 2 puffs into the lungs 2 times daily 12 g 11     losartan (COZAAR) 25 MG tablet TAKE 1 TABLET(25 MG) BY MOUTH DAILY. 90 tablet 3     methocarbamol (ROBAXIN) 500 MG tablet Take 1 tablet (500 mg) by mouth 3 times daily as needed for muscle spasms 30 tablet 0       Allergies:  No Known Allergies    Social History:   History   Drug Use No      History   Smoking Status     Never   Smokeless Tobacco     Never     Social History    Substance and Sexual Activity      Alcohol use: No       Family History:  Family History   Problem Relation Age of Onset     Cancer Father      Throat cancer Father          \"I think age 82.\"     Hypertension Mother      Hereditary Breast and Ovarian Cancer Syndrome No family hx of        Review of Systems:   A comprehensive 12 system review of systems was carried out.  Pertinent positives and negatives are noted above. Otherwise negative for contributory information.    Objective & Physical Exam:  /84   Pulse 52   Ht 1.575 m (5' 2\")   Wt 89.7 kg (197 lb 11.2 oz)   BMI 36.16 kg/m    Wt Readings from Last 2 Encounters:   23 89.7 kg (197 lb 11.2 oz)   02/10/23 89.9 kg (198 lb 4.8 oz)     Body mass index is 36.16 kg/m .   Body surface area is 1.98 meters squared.    Constitutional: appears stated age, in no apparent distress, appears to be well nourished  Head: normocephalic, atraumatic  Neck: supple, trachea midline  Pulmonary: clear to auscultation bilaterally, no wheezes, no rales, no increased work of breathing  Cardiovascular: JVP normal, regular rate, regular rhythm, normal S1 and S2, no S3, S4, no murmur appreciated, no lower extremity edema  Gastrointestinal: no guarding, non-rigid   Neurologic: awake, alert, moves all extremities  Skin: no jaundice, warm on limited exam  Psychiatric: affect is normal, answers questions appropriately, oriented to self and place    Data reviewed:  Lab Results   Component Value Date    WBC 5.2 2022    RBC 4.67 " 12/08/2022    HGB 13.0 12/08/2022    HCT 40.2 12/08/2022    MCV 86 12/08/2022    MCH 27.8 12/08/2022    MCHC 32.3 12/08/2022    RDW 13.2 12/08/2022     12/08/2022     Sodium   Date Value Ref Range Status   12/08/2022 143 136 - 145 mmol/L Final     Potassium   Date Value Ref Range Status   12/08/2022 3.8 3.4 - 5.3 mmol/L Final   02/16/2022 3.9 3.5 - 5.0 mmol/L Final     Chloride   Date Value Ref Range Status   12/08/2022 104 98 - 107 mmol/L Final   02/16/2022 105 98 - 107 mmol/L Final     Carbon Dioxide (CO2)   Date Value Ref Range Status   12/08/2022 26 22 - 29 mmol/L Final   02/16/2022 27 22 - 31 mmol/L Final     Anion Gap   Date Value Ref Range Status   12/08/2022 13 7 - 15 mmol/L Final   02/16/2022 9 5 - 18 mmol/L Final     Glucose   Date Value Ref Range Status   12/08/2022 117 (H) 70 - 99 mg/dL Final   02/16/2022 94 70 - 125 mg/dL Final     Urea Nitrogen   Date Value Ref Range Status   12/08/2022 7.3 (L) 8.0 - 23.0 mg/dL Final   02/16/2022 11 8 - 22 mg/dL Final     Creatinine   Date Value Ref Range Status   12/08/2022 0.68 0.51 - 0.95 mg/dL Final     GFR Estimate   Date Value Ref Range Status   12/08/2022 >90 >60 mL/min/1.73m2 Final     Comment:     Effective December 21, 2021 eGFRcr in adults is calculated using the 2021 CKD-EPI creatinine equation which includes age and gender (Floyd et al., NEJM, DOI: 10.1056/MKIXrg8563009)   10/20/2020 >60 >60 mL/min/1.73m2 Final     Calcium   Date Value Ref Range Status   12/08/2022 9.5 8.6 - 10.0 mg/dL Final     Bilirubin Total   Date Value Ref Range Status   02/10/2023 0.3 <=1.2 mg/dL Final     Alkaline Phosphatase   Date Value Ref Range Status   02/10/2023 84 35 - 104 U/L Final     ALT   Date Value Ref Range Status   02/10/2023 53 (H) 10 - 35 U/L Final     AST   Date Value Ref Range Status   02/10/2023 33 10 - 35 U/L Final     Recent Labs   Lab Test 02/10/23  1039 02/16/22  1227   CHOL 247* 246*   HDL 69 64   * 168*   TRIG 77 71      Lab Results   Component  Value Date    A1C 6.1 02/10/2023    A1C 5.8 02/16/2022    A1C 6.1 10/20/2020    A1C 6.2 11/22/2019    A1C 5.8 03/25/2019        No results found for this or any previous visit (from the past 4320 hour(s)).     Thank you for allowing me to participate in the care of your patient.      Sincerely,     Knedrick Kenyon MD     [facility]  cc:   Anisha Ramirez, APRN CNP  [address] [facility] [address]  [facility] [address]  [place] [address]

## 2023-02-13 NOTE — PROGRESS NOTES
Cardiology Clinic Consultation:    February 13, 2023   Patient Name: Roverto Gonzalez  Patient MRN: [id number]    Consult indication: palpitations, COLON    HPI:    I had the opportunity to see patient Roverto Gonzalez in cardiology clinic for a consultation. Patient is followed by our colleague BOBY Alvarado CNP with Primary Care.     Patient is a pleasant 58-year-old female with a past medical history significant for obesity, asthma, restrictive lung disease, breast cancer status post radiation therapy, dyslipidemia, who presents for further evaluation management of dyspnea on exertion and palpitations.    Patient reports that over the past couple of months she has had more noticeable dyspnea on exertion.  Activities such as climbing stairs has resulted in more dyspnea, symptoms have been stable since then.  She denies any chest pain, chest pressure, abnormal lower extremity swelling, symptoms of orthopnea/PND.  Patient also reports experiencing intermittent palpitations, described as a rapid heartbeat sensation, lasting for few seconds to a couple of minutes at a time.  These episodes are sporadic, nonexertional in nature.  She denies any significant associated dizziness/lightheadedness, no presyncope/syncope.  Patient is also been experiencing some dyspnea when lying on her left side, which she has been doing due to some back pain which limits her ability to sleep on her right side, which she had been doing in the past.  Assessment has included an ECG 12/8/2022 that demonstrates normal sinus rhythm at 60 bpm, RSR prime pattern, voltage criteria for LVH.  No acute ischemic changes.  Blood pressure today in clinic 133 over 84 mmHg, she states that her blood pressure at home is generally in the 130 mmHg over 70-80 millimeters mercury range when she checks it at a local drugstore.  Reviewed last cholesterol labs notable for .  TSH normal, BMP unremarkable, CBC normal.    Patient reports that she does not  "exercise regularly, however she is active for most of the day.  She does not smoke cigarettes, does not drink alcohol regularly.  She drinks about a cup of coffee a day.  She also endorses that she likely does not consume enough fluids throughout the day.  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    Last echocardiogram 2/10/2016 from outside hospital report noted normal biventricular function, no significant valvular abnormalities.     Assessment and Plan/Recommendations:    # Palpitations, suspect PACs/PVCs  # Dyspnea on exertion, etiology unclear, has baseline asthma and restrictive lung disease, symptoms not classically characteristic of myocardial ischemia, however she does have risk factors for CAD including age, obesity, sedentary lifestyle, dyslipidemia.  She also received radiation for breast cancer, though she states that she was told this would not increase her risk of cardiac complications, and from what she describes, it seems that this was directed more laterally away from the heart.  Euvolemic on exam.  # HTN, BP borderline elevated    - Holter monitor  - ECG treadmill stress test  - TTE  - We will add on an NT proBNP to labs from a couple of days ago  - Continue losartan 25 mg daily, pending the results of the Holter monitor, may start low-dose beta-blocker therapy (discussed caution given asthma), versus up titration of losartan for improved blood pressure control  - Encouraged regular aerobic exercise, heart healthy \"Mediterranean\" diet for dyslipidemia, does not meet criteria for initiation of statin therapy  - Patient is planning to go to [place] for about a month, recommend follow-up with cardiology [facility] when she returns, it the aforementioned diagnostic studies are reassuring, further scheduled follow-up in cardiology clinic would not be required, advise further evaluation/management for possible pulmonary etiology of her symptoms with PCP/Pulmonology    Thank you for allowing our team to " participate in the care of Roverto Gonzalez.  Please do not hesitate to call or page me with any questions or concerns.    Sincerely,     Kendrick Kenyon MD, [facility]  Cardiology  February 13, 2023    cc  Anisha Ramirez APRN CNP  [address]  [place] [address]        Past Medical History:     The 10-year ASCVD risk score (Laura DK, et al., 2019) is: 4.8%    Values used to calculate the score:      Age: 58 years      Sex: Female      Is Non- : No      Diabetic: No      Tobacco smoker: No      Systolic Blood Pressure: 133 mmHg      Is BP treated: Yes      HDL Cholesterol: 69 mg/dL      Total Cholesterol: 247 mg/dL  Patient Active Problem List   Diagnosis     Malignant neoplasm of left female breast, unspecified estrogen receptor status, unspecified site of breast (H)     Malignant neoplasm of lower-outer quadrant of female breast (H)     Vitamin D deficiency     Thyroid cyst     Restrictive lung disease     Prediabetes     Sicca syndrome (H)     Mixed hyperlipidemia     Asthma     Benign essential hypertension     Cerebral arterial aneurysm     DCIS (ductal carcinoma in situ)     Helicobacter pylori infection     Obesity (BMI 30.0-34.9)       Past Surgical History:   Past Surgical History:   Procedure Laterality Date     BIOPSY BREAST Left 2014     LUMPECTOMY BREAST      breast left     LUMPECTOMY BREAST Left 6/14     US ASPIRATION OF SEROMA/HEMATOMA/ABSCESS/CYST Right 2017       Medications (outpatient):  Current Outpatient Medications   Medication Sig Dispense Refill     albuterol (PROAIR HFA/PROVENTIL HFA/VENTOLIN HFA) 108 (90 Base) MCG/ACT inhaler Inhale 2 puffs into the lungs every 6 hours as needed for wheezing 18 g 3     fluticasone (FLOVENT HFA) 110 MCG/ACT inhaler Inhale 2 puffs into the lungs 2 times daily 12 g 11     losartan (COZAAR) 25 MG tablet TAKE 1 TABLET(25 MG) BY MOUTH DAILY. 90 tablet 3     methocarbamol (ROBAXIN) 500 MG tablet Take 1 tablet (500 mg)  "by mouth 3 times daily as needed for muscle spasms 30 tablet 0       Allergies:  No Known Allergies    Social History:   History   Drug Use No      History   Smoking Status     Never   Smokeless Tobacco     Never     Social History    Substance and Sexual Activity      Alcohol use: No       Family History:  Family History   Problem Relation Age of Onset     Cancer Father      Throat cancer Father          \"I think age 82.\"     Hypertension Mother      Hereditary Breast and Ovarian Cancer Syndrome No family hx of        Review of Systems:   A comprehensive 12 system review of systems was carried out.  Pertinent positives and negatives are noted above. Otherwise negative for contributory information.    Objective & Physical Exam:  /84   Pulse 52   Ht 1.575 m (5' 2\")   Wt 89.7 kg (197 lb 11.2 oz)   BMI 36.16 kg/m    Wt Readings from Last 2 Encounters:   23 89.7 kg (197 lb 11.2 oz)   02/10/23 89.9 kg (198 lb 4.8 oz)     Body mass index is 36.16 kg/m .   Body surface area is 1.98 meters squared.    Constitutional: appears stated age, in no apparent distress, appears to be well nourished  Head: normocephalic, atraumatic  Neck: supple, trachea midline  Pulmonary: clear to auscultation bilaterally, no wheezes, no rales, no increased work of breathing  Cardiovascular: JVP normal, regular rate, regular rhythm, normal S1 and S2, no S3, S4, no murmur appreciated, no lower extremity edema  Gastrointestinal: no guarding, non-rigid   Neurologic: awake, alert, moves all extremities  Skin: no jaundice, warm on limited exam  Psychiatric: affect is normal, answers questions appropriately, oriented to self and place    Data reviewed:  Lab Results   Component Value Date    WBC 5.2 2022    RBC 4.67 2022    HGB 13.0 2022    HCT 40.2 2022    MCV 86 2022    MCH 27.8 2022    MCHC 32.3 2022    RDW 13.2 2022     2022     Sodium   Date Value Ref Range Status "   12/08/2022 143 136 - 145 mmol/L Final     Potassium   Date Value Ref Range Status   12/08/2022 3.8 3.4 - 5.3 mmol/L Final   02/16/2022 3.9 3.5 - 5.0 mmol/L Final     Chloride   Date Value Ref Range Status   12/08/2022 104 98 - 107 mmol/L Final   02/16/2022 105 98 - 107 mmol/L Final     Carbon Dioxide (CO2)   Date Value Ref Range Status   12/08/2022 26 22 - 29 mmol/L Final   02/16/2022 27 22 - 31 mmol/L Final     Anion Gap   Date Value Ref Range Status   12/08/2022 13 7 - 15 mmol/L Final   02/16/2022 9 5 - 18 mmol/L Final     Glucose   Date Value Ref Range Status   12/08/2022 117 (H) 70 - 99 mg/dL Final   02/16/2022 94 70 - 125 mg/dL Final     Urea Nitrogen   Date Value Ref Range Status   12/08/2022 7.3 (L) 8.0 - 23.0 mg/dL Final   02/16/2022 11 8 - 22 mg/dL Final     Creatinine   Date Value Ref Range Status   12/08/2022 0.68 0.51 - 0.95 mg/dL Final     GFR Estimate   Date Value Ref Range Status   12/08/2022 >90 >60 mL/min/1.73m2 Final     Comment:     Effective December 21, 2021 eGFRcr in adults is calculated using the 2021 CKD-EPI creatinine equation which includes age and gender (Floyd et al., NEJ, DOI: 10.1056/EJAAps8103215)   10/20/2020 >60 >60 mL/min/1.73m2 Final     Calcium   Date Value Ref Range Status   12/08/2022 9.5 8.6 - 10.0 mg/dL Final     Bilirubin Total   Date Value Ref Range Status   02/10/2023 0.3 <=1.2 mg/dL Final     Alkaline Phosphatase   Date Value Ref Range Status   02/10/2023 84 35 - 104 U/L Final     ALT   Date Value Ref Range Status   02/10/2023 53 (H) 10 - 35 U/L Final     AST   Date Value Ref Range Status   02/10/2023 33 10 - 35 U/L Final     Recent Labs   Lab Test 02/10/23  1039 02/16/22  1227   CHOL 247* 246*   HDL 69 64   * 168*   TRIG 77 71      Lab Results   Component Value Date    A1C 6.1 02/10/2023    A1C 5.8 02/16/2022    A1C 6.1 10/20/2020    A1C 6.2 11/22/2019    A1C 5.8 03/25/2019        No results found for this or any previous visit (from the past 4320 hour(s)).

## 2023-02-13 NOTE — PATIENT INSTRUCTIONS
February 13, 2023    Thank you for allowing our Cardiology team to participate in your care.     Please note the following changes to your heart treatment plan:     Medication changes:   - none    Tests to be done:  - Holter monitor   - ECG treadmill stress test  - TTE (heart ultrasound)    Follow up:  - Follow up in about 2 months with cardiology [facility], or sooner as needed.      For scheduling, please call [phone].    Please contact our team at [phone] (Ruby MELENDREZ) or [phone] for any questions or concerns.     If you are having a medical emergency, please call [phone].     Sincerely,    Kendrick Kenyon MD, FACC  Cardiology    [facility] [place]

## 2023-02-14 ENCOUNTER — THERAPY VISIT (OUTPATIENT)
Dept: PHYSICAL THERAPY | Facility: CLINIC | Age: 58
End: 2023-02-14
Attending: NURSE PRACTITIONER
Payer: COMMERCIAL

## 2023-02-14 ENCOUNTER — HOSPITAL ENCOUNTER (OUTPATIENT)
Dept: CARDIOLOGY | Facility: CLINIC | Age: 58
Discharge: HOME | End: 2023-02-14
Attending: INTERNAL MEDICINE | Admitting: INTERNAL MEDICINE
Payer: COMMERCIAL

## 2023-02-14 DIAGNOSIS — G89.29 CHRONIC BILATERAL THORACIC BACK PAIN: ICD-10-CM

## 2023-02-14 DIAGNOSIS — R00.2 PALPITATIONS: ICD-10-CM

## 2023-02-14 DIAGNOSIS — R06.09 DYSPNEA ON EXERTION: ICD-10-CM

## 2023-02-14 DIAGNOSIS — M54.6 THORACIC BACK PAIN: ICD-10-CM

## 2023-02-14 DIAGNOSIS — M54.6 CHRONIC BILATERAL THORACIC BACK PAIN: ICD-10-CM

## 2023-02-14 LAB — LVEF ECHO: NORMAL

## 2023-02-14 PROCEDURE — 97110 THERAPEUTIC EXERCISES: CPT | Mod: GP | Performed by: PHYSICAL THERAPIST

## 2023-02-14 PROCEDURE — 93306 TTE W/DOPPLER COMPLETE: CPT | Mod: 26 | Performed by: INTERNAL MEDICINE

## 2023-02-14 PROCEDURE — 97161 PT EVAL LOW COMPLEX 20 MIN: CPT | Mod: GP | Performed by: PHYSICAL THERAPIST

## 2023-02-14 PROCEDURE — 93306 TTE W/DOPPLER COMPLETE: CPT

## 2023-02-14 ASSESSMENT — ACTIVITIES OF DAILY LIVING (ADL)
WALKING_APPROXIMATELY_10_MINUTES: NO DIFFICULTY AT ALL
WALKING_UP_STEEP_HILLS: SLIGHT DIFFICULTY
SITTING_FOR_15_MINUTES: NO DIFFICULTY AT ALL
HOS_ADL_SCORE(%): 85
HOS_ADL_COUNT: 15
GETTING_INTO_AND_OUT_OF_A_BATHTUB: NO DIFFICULTY AT ALL
GETTING_INTO_AND_OUT_OF_AN_AVERAGE_CAR: NO DIFFICULTY AT ALL
WALKING_DOWN_STEEP_HILLS: NO DIFFICULTY AT ALL
TWISTING/PIVOTING_ON_INVOLVED_LEG: NO DIFFICULTY AT ALL
STANDING_FOR_15_MINUTES: SLIGHT DIFFICULTY
ROLLING_OVER_IN_BED: NO DIFFICULTY AT ALL
LIGHT_TO_MODERATE_WORK: NO DIFFICULTY AT ALL
WALKING_15_MINUTES_OR_GREATER: NO DIFFICULTY AT ALL
DEEP_SQUATTING: MODERATE DIFFICULTY
PUTTING_ON_SOCKS_AND_SHOES: SLIGHT DIFFICULTY
RECREATIONAL_ACTIVITIES: SLIGHT DIFFICULTY
HOS_ADL_HIGHEST_POTENTIAL_SCORE: 60
HEAVY_WORK: MODERATE DIFFICULTY
HOS_ADL_ITEM_SCORE_TOTAL: 51
GOING_DOWN_1_FLIGHT_OF_STAIRS: SLIGHT DIFFICULTY
GOING_UP_1_FLIGHT_OF_STAIRS: SLIGHT DIFFICULTY

## 2023-02-14 NOTE — PROGRESS NOTES
Physical Therapy Initial Evaluation  Subjective:  Pt reports chronic hx of UBP and L UE numbness to L hand and lateral fingers. MD order date 2- will serve as onset for this episode. She feels weakness into L UE with ADLs such as lifting and carrying and opening items.   She notes occasional catching and sharp pain in neck and upper cervical with faster motions of UE ADLS such as washing or dressing.     Pt reports R lateral hip pain that inc with sleeping on R side. SHe has slept more on R side due to L upper chest area discomfort when sleeping on L     She notes more recent feelings of upper thoracic/chest area sx , she is being followed with cardiologist evaluation/testing/treatement.     The history is provided by the patient. No  was used.                       Objective:  Standing Alignment:      Shoulder/UE:  Humeral head anterior R, humeral head anterior L, elevated scapula L and elevated scapula R              Gait:      Deviations:  Shoulder:  Decr arm swing R, decr arm swing L and shoulder hiking L    Flexibility/Screens:   Positive screens:  Hip (tenderness R hip gr troch/bursa, squat pattern wtih anterior knee translation and dec trunk flexion. ) and Lumbar (mod loss lumbar ext, R lumbar SB with inc R lateral hip tightness, L SB with non painful stretch R LS )  Upper Extremity:    Decreased left upper extremity flexibility at:  Pectoralis Major and Pectoralis Minor    Decreased right upper extremity flexibility present at:  Pectoralis Major and Pectoralis Minor  Lower Extremity:      Decreased right lower extremity flexibility:  Hip Flexors  Spine:  Decreased left spine flexibility:  Scalenes; Upper Trap; Levator and Quadratus Lumborum    Decreased right spine flexibility:  Upper Trap; Levator and Quadratus Lumborum                  Cervical/Thoracic Evaluation    AROM:  AROM Cervical:    Flexion:          Extension:       Min loss, primary motion mid cerv min ext at CTJ,    Rotation:         Left:     Right:  Side Bend:      Left: mod loss     Right:  Mod loss      Headaches: cervical  Cervical Myotomes:  Cervical myotomes: early L shoulder shrug with resisted elbow ext and flexion           C6 (Biceps):  Left: 4      C7 (Triceps):  Left: 4-              Cervical Dermatomes:  Cervical dermatomes: pt reports numbness to L hand ulnar distribution- sx dating back possible a couple of years, not increased recently                     Cervical Palpation:    Tenderness present at Left:    Scalenes; Levator and Erector Spinae                                                    General     ROS    Assessment/Plan:    Patient is a 58 year old female with thoracic complaints.    Patient has the following significant findings with corresponding treatment plan.                Diagnosis 1:  Chronic thoracic pain  Pain -  hot/cold therapy, manual therapy and self management  Decreased ROM/flexibility - manual therapy and therapeutic exercise  Decreased joint mobility - manual therapy and therapeutic exercise  Decreased strength - therapeutic exercise and therapeutic activities  Impaired muscle performance - neuro re-education  Decreased function - therapeutic activities  Impaired posture - neuro re-education  Instability -  Therapeutic Activity  Therapeutic Exercise    Therapy Evaluation Codes:   1) History comprised of:   Personal factors that impact the plan of care:      Time since onset of symptoms.    Comorbidity factors that impact the plan of care are:      Hx breast cancer and treatement .     Medications impacting care: None.  2) Examination of Body Systems comprised of:   Body structures and functions that impact the plan of care:      Cervical spine, Hip, Lumbar spine, Shoulder and Thoracic Spine.   Activity limitations that impact the plan of care are:      Dressing, Lifting, Sleeping and Laying down.  3) Clinical presentation characteristics  are:   Stable/Uncomplicated.  4) Decision-Making    Low complexity using standardized patient assessment instrument and/or measureable assessment of functional outcome.  Cumulative Therapy Evaluation is: Low complexity.    Previous and current functional limitations:  (See Goal Flow Sheet for this information)    Short term and Long term goals: (See Goal Flow Sheet for this information)     Communication ability:  Patient appears to be able to clearly communicate and understand verbal and written communication and follow directions correctly.  Treatment Explanation - The following has been discussed with the patient:   RX ordered/plan of care  Anticipated outcomes  Possible risks and side effects  This patient would benefit from PT intervention to resume normal activities.   Rehab potential is good.    Frequency:  1 X week, once daily  Duration:  for 4 visits Pt is leaving the country for two months starting on 2-  Discharge Plan:  Achieve all LTG.  Independent in home treatment program.  Reach maximal therapeutic benefit.    Please refer to the daily flowsheet for treatment today, total treatment time and time spent performing 1:1 timed codes.

## 2023-02-15 ENCOUNTER — THERAPY VISIT (OUTPATIENT)
Dept: PHYSICAL THERAPY | Facility: CLINIC | Age: 58
End: 2023-02-15
Payer: COMMERCIAL

## 2023-02-15 DIAGNOSIS — M54.6 THORACIC BACK PAIN: Primary | ICD-10-CM

## 2023-02-15 PROCEDURE — 97112 NEUROMUSCULAR REEDUCATION: CPT | Mod: GP | Performed by: PHYSICAL THERAPIST

## 2023-02-15 PROCEDURE — 97110 THERAPEUTIC EXERCISES: CPT | Mod: GP | Performed by: PHYSICAL THERAPIST

## 2023-02-15 NOTE — PROGRESS NOTES
Physical Therapy Initial Evaluation  Subjective:    Patient Health History             Pertinent medical history includes: asthma, high blood pressure and rheumatoid arthritis.            Current medications:  High blood pressure medication.    Current occupation is Self employed.                                       Objective:  System    Physical Exam    General     ROS    Assessment/Plan:

## 2023-02-15 NOTE — RESULT ENCOUNTER NOTE
Results reviewed, please let the patient know that overall findings are reassuring, normal cardiac structure and function, no significant valvular abnormalities. Follow up as previously planned, thanks!

## 2023-02-17 ENCOUNTER — THERAPY VISIT (OUTPATIENT)
Dept: PHYSICAL THERAPY | Facility: CLINIC | Age: 58
End: 2023-02-17
Payer: COMMERCIAL

## 2023-02-17 DIAGNOSIS — G89.29 CHRONIC BILATERAL THORACIC BACK PAIN: ICD-10-CM

## 2023-02-17 DIAGNOSIS — M54.6 CHRONIC BILATERAL THORACIC BACK PAIN: ICD-10-CM

## 2023-02-17 DIAGNOSIS — M54.6 THORACIC BACK PAIN: Primary | ICD-10-CM

## 2023-02-17 PROCEDURE — 97110 THERAPEUTIC EXERCISES: CPT | Mod: GP | Performed by: PHYSICAL THERAPIST

## 2023-02-17 PROCEDURE — 97140 MANUAL THERAPY 1/> REGIONS: CPT | Mod: GP | Performed by: PHYSICAL THERAPIST

## 2023-02-20 ENCOUNTER — APPOINTMENT (OUTPATIENT)
Dept: ULTRASOUND IMAGING | Facility: CLINIC | Age: 58
End: 2023-02-20
Attending: EMERGENCY MEDICINE
Payer: COMMERCIAL

## 2023-02-20 ENCOUNTER — APPOINTMENT (OUTPATIENT)
Dept: GENERAL RADIOLOGY | Facility: CLINIC | Age: 58
End: 2023-02-20
Attending: EMERGENCY MEDICINE
Payer: COMMERCIAL

## 2023-02-20 ENCOUNTER — HOSPITAL ENCOUNTER (EMERGENCY)
Facility: CLINIC | Age: 58
Discharge: HOME | End: 2023-02-20
Attending: EMERGENCY MEDICINE | Admitting: EMERGENCY MEDICINE
Payer: COMMERCIAL

## 2023-02-20 VITALS
WEIGHT: 197 LBS | HEIGHT: 65 IN | OXYGEN SATURATION: 100 % | TEMPERATURE: 98.2 F | RESPIRATION RATE: 22 BRPM | HEART RATE: 65 BPM | SYSTOLIC BLOOD PRESSURE: 169 MMHG | DIASTOLIC BLOOD PRESSURE: 82 MMHG | BODY MASS INDEX: 32.82 KG/M2

## 2023-02-20 DIAGNOSIS — I49.49 ECTOPIC BEATS: ICD-10-CM

## 2023-02-20 DIAGNOSIS — R00.2 PALPITATIONS: ICD-10-CM

## 2023-02-20 LAB
ALBUMIN SERPL BCG-MCNC: 4.2 G/DL (ref 3.5–5.2)
ALP SERPL-CCNC: 95 U/L (ref 35–104)
ALT SERPL W P-5'-P-CCNC: 49 U/L (ref 10–35)
ANION GAP SERPL CALCULATED.3IONS-SCNC: 12 MMOL/L (ref 7–15)
AST SERPL W P-5'-P-CCNC: 37 U/L (ref 10–35)
ATRIAL RATE - MUSE: 61 BPM
BASOPHILS # BLD AUTO: 0.1 10E3/UL (ref 0–0.2)
BASOPHILS NFR BLD AUTO: 1 %
BILIRUB SERPL-MCNC: 0.4 MG/DL
BUN SERPL-MCNC: 14.9 MG/DL (ref 8–23)
CALCIUM SERPL-MCNC: 9.5 MG/DL (ref 8.8–10.2)
CHLORIDE SERPL-SCNC: 105 MMOL/L (ref 98–107)
CREAT SERPL-MCNC: 0.67 MG/DL (ref 0.51–0.95)
DEPRECATED HCO3 PLAS-SCNC: 24 MMOL/L (ref 22–29)
DIASTOLIC BLOOD PRESSURE - MUSE: NORMAL MMHG
EOSINOPHIL # BLD AUTO: 0.3 10E3/UL (ref 0–0.7)
EOSINOPHIL NFR BLD AUTO: 6 %
ERYTHROCYTE [DISTWIDTH] IN BLOOD BY AUTOMATED COUNT: 12.2 % (ref 10–15)
GFR SERPL CREATININE-BSD FRML MDRD: >90 ML/MIN/1.73M2
GLUCOSE SERPL-MCNC: 117 MG/DL (ref 70–99)
HCG SERPL QL: NEGATIVE
HCT VFR BLD AUTO: 39.8 % (ref 35–47)
HGB BLD-MCNC: 13.1 G/DL (ref 11.7–15.7)
IMM GRANULOCYTES # BLD: 0 10E3/UL
IMM GRANULOCYTES NFR BLD: 0 %
INTERPRETATION ECG - MUSE: NORMAL
LYMPHOCYTES # BLD AUTO: 2.1 10E3/UL (ref 0.8–5.3)
LYMPHOCYTES NFR BLD AUTO: 43 %
MAGNESIUM SERPL-MCNC: 2.1 MG/DL (ref 1.7–2.3)
MCH RBC QN AUTO: 28.3 PG (ref 26.5–33)
MCHC RBC AUTO-ENTMCNC: 32.9 G/DL (ref 31.5–36.5)
MCV RBC AUTO: 86 FL (ref 78–100)
MONOCYTES # BLD AUTO: 0.6 10E3/UL (ref 0–1.3)
MONOCYTES NFR BLD AUTO: 11 %
NEUTROPHILS # BLD AUTO: 1.9 10E3/UL (ref 1.6–8.3)
NEUTROPHILS NFR BLD AUTO: 39 %
NRBC # BLD AUTO: 0 10E3/UL
NRBC BLD AUTO-RTO: 0 /100
NT-PROBNP SERPL-MCNC: 61 PG/ML (ref 0–900)
P AXIS - MUSE: 19 DEGREES
PLATELET # BLD AUTO: 187 10E3/UL (ref 150–450)
POTASSIUM SERPL-SCNC: 4.2 MMOL/L (ref 3.4–5.3)
PR INTERVAL - MUSE: 158 MS
PROT SERPL-MCNC: 7.8 G/DL (ref 6.4–8.3)
QRS DURATION - MUSE: 90 MS
QT - MUSE: 424 MS
QTC - MUSE: 426 MS
R AXIS - MUSE: -10 DEGREES
RBC # BLD AUTO: 4.63 10E6/UL (ref 3.8–5.2)
SODIUM SERPL-SCNC: 141 MMOL/L (ref 136–145)
SYSTOLIC BLOOD PRESSURE - MUSE: NORMAL MMHG
T AXIS - MUSE: 24 DEGREES
TROPONIN T SERPL HS-MCNC: <6 NG/L
TSH SERPL DL<=0.005 MIU/L-ACNC: 2.52 UIU/ML (ref 0.3–4.2)
VENTRICULAR RATE- MUSE: 61 BPM
WBC # BLD AUTO: 4.9 10E3/UL (ref 4–11)

## 2023-02-20 PROCEDURE — 36415 COLL VENOUS BLD VENIPUNCTURE: CPT | Performed by: EMERGENCY MEDICINE

## 2023-02-20 PROCEDURE — 99285 EMERGENCY DEPT VISIT HI MDM: CPT | Mod: 25 | Performed by: EMERGENCY MEDICINE

## 2023-02-20 PROCEDURE — 83880 ASSAY OF NATRIURETIC PEPTIDE: CPT | Performed by: EMERGENCY MEDICINE

## 2023-02-20 PROCEDURE — 84443 ASSAY THYROID STIM HORMONE: CPT | Performed by: EMERGENCY MEDICINE

## 2023-02-20 PROCEDURE — 84484 ASSAY OF TROPONIN QUANT: CPT | Performed by: EMERGENCY MEDICINE

## 2023-02-20 PROCEDURE — 85025 COMPLETE CBC W/AUTO DIFF WBC: CPT | Performed by: EMERGENCY MEDICINE

## 2023-02-20 PROCEDURE — 93971 EXTREMITY STUDY: CPT | Mod: 26 | Performed by: STUDENT IN AN ORGANIZED HEALTH CARE EDUCATION/TRAINING PROGRAM

## 2023-02-20 PROCEDURE — 93005 ELECTROCARDIOGRAM TRACING: CPT | Performed by: EMERGENCY MEDICINE

## 2023-02-20 PROCEDURE — 82310 ASSAY OF CALCIUM: CPT | Performed by: EMERGENCY MEDICINE

## 2023-02-20 PROCEDURE — 71046 X-RAY EXAM CHEST 2 VIEWS: CPT

## 2023-02-20 PROCEDURE — 93010 ELECTROCARDIOGRAM REPORT: CPT | Performed by: EMERGENCY MEDICINE

## 2023-02-20 PROCEDURE — 84703 CHORIONIC GONADOTROPIN ASSAY: CPT | Performed by: EMERGENCY MEDICINE

## 2023-02-20 PROCEDURE — 93971 EXTREMITY STUDY: CPT | Mod: LT

## 2023-02-20 PROCEDURE — 71046 X-RAY EXAM CHEST 2 VIEWS: CPT | Mod: 26 | Performed by: RADIOLOGY

## 2023-02-20 PROCEDURE — 99284 EMERGENCY DEPT VISIT MOD MDM: CPT | Mod: 25 | Performed by: EMERGENCY MEDICINE

## 2023-02-20 PROCEDURE — 83735 ASSAY OF MAGNESIUM: CPT | Performed by: EMERGENCY MEDICINE

## 2023-02-20 RX ORDER — DILTIAZEM HYDROCHLORIDE 120 MG/1
120 CAPSULE, EXTENDED RELEASE ORAL DAILY
Qty: 30 CAPSULE | Refills: 0 | Status: SHIPPED | OUTPATIENT
Start: 2023-02-20

## 2023-02-20 ASSESSMENT — ACTIVITIES OF DAILY LIVING (ADL): ADLS_ACUITY_SCORE: 35

## 2023-02-20 NOTE — ED PROVIDER NOTES
History     Chief Complaint   Patient presents with     Palpitations     HPI  Roverto Gonzalez is a 58 year old female with a past medical history of breast cancer (status postlumpectomy and radiation), restrictive lung disease, prediabetes, hyperlipidemia, hypertension, cerebral aneurysm, vitamin D deficiency, H. pylori who presents to the emergency department with a chief complaint of palpitations.This has been present for 1 month.  Associate with tingling in her left arm as well as occasional dyspnea.  She denies dyspnea currently.  She is afebrile on arrival to the emergency department. She denies leg/arm pain or swelling. No chest pain.     The patient has been evaluated for her palpitations and associated dyspnea on exertion before and has had work-up including echocardiogram.  Results were found to be reassuring and PACs/PVCs were suspected.  The patient is on 25 mg daily losartan, increasing this or adding a low-dose beta-blocker was considered, but there was some concern about starting this due to the pt's history of asthma. She reports that no new medications were started.  The patient is scheduled for Holter monitoring (to start 2/23), an EKG treadmill stress test (2/22), and is scheduled to follow-up with cardiology in clinic 5/22.    I have reviewed the Medications, Allergies, Past Medical and Surgical History, and Social History in the Epic system.    Transthoracic echocardiogram 2/14/2023  Interpretation Summary     Left ventricular systolic function is normal.  The visual ejection fraction is 65-70%.  Left ventricular diastolic function is normal.  No regional wall motion abnormalities.  The right ventricle is normal in size and function.  No significant valve disease.  Normal inferior vena cava.     There is no comparison study available.    Past Medical History:   Diagnosis Date     Breast cancer (H) 2014    lumpectomy on left with radiation      Breast cancer (H) 2014    s/p resection and radiation      DCIS (ductal carcinoma in situ) of breast 2014    ER-[facility]-     GERD (gastroesophageal reflux disease)      Hx of radiation therapy 2014     Hypertension      Vitamin D deficiency      Past Surgical History:   Procedure Laterality Date     BIOPSY BREAST Left 2014     LUMPECTOMY BREAST      breast left     LUMPECTOMY BREAST Left 6/14      ASPIRATION OF SEROMA/HEMATOMA/ABSCESS/CYST Right 2017     No current facility-administered medications for this encounter.     Current Outpatient Medications   Medication     albuterol (PROAIR HFA/PROVENTIL HFA/VENTOLIN HFA) 108 (90 Base) MCG/ACT inhaler     fluticasone (FLOVENT HFA) 110 MCG/ACT inhaler     losartan (COZAAR) 25 MG tablet     methocarbamol (ROBAXIN) 500 MG tablet     No Known Allergies  Past medical history, past surgical history, medications, and allergies were reviewed with the patient. Additional pertinent items: None    Social History     Socioeconomic History     Marital status:      Spouse name: Not on file     Number of children: Not on file     Years of education: Not on file     Highest education level: Not on file   Occupational History     Not on file   Tobacco Use     Smoking status: Never     Smokeless tobacco: Never   Vaping Use     Vaping Use: Never used   Substance and Sexual Activity     Alcohol use: No     Drug use: No     Sexual activity: Yes     Partners: Male   Other Topics Concern     Parent/sibling w/ CABG, MI or angioplasty before 65F 55M? Not Asked   Social History Narrative     Not on file     Social Determinants of Health     Financial Resource Strain: Not on file   Food Insecurity: Not on file   Transportation Needs: Not on file   Physical Activity: Not on file   Stress: Not on file   Social Connections: Not on file   Intimate Partner Violence: Not on file   Housing Stability: Not on file     Social history was reviewed with the patient. Additional pertinent items: None    Review of Systems  A medically appropriate review of  "systems was performed with pertinent positives and negatives noted in the HPI, and all other systems negative.    Physical Exam   BP: (!) 169/82  Pulse: 65  Temp: 98.2  F (36.8  C)  Resp: 22  Height: 165.1 cm (5' 5\")  Weight: 89.4 kg (197 lb)  SpO2: 100 %      General: Well nourished, well developed, NAD  HEENT: EOMI, anicteric. NCAT, MMM  Neck: no jugular venous distension, supple, nl ROM  Cardiac: Regular rate and rhythm. No murmurs, rubs, or gallops. Normal S1, S2.  Intact peripheral pulses  Pulm: CTAB, no stridor, wheezes, rales, rhonchi   Skin: Warm and dry to the touch.  No rash  Extremities: No LE edema, no cyanosis, w/w/p, no posterior calf tenderness, no arm TTP  Neuro: A&Ox3, no gross focal deficits    ED Course        Procedures                 ED Course Selections:        EKG Interpretation:      Interpreted by Laura Conley MD  Time reviewed: 0829  Symptoms at time of EKG: palpitations   Rhythm: normal sinus   Rate: normal, 61 bpm  Axis: normal  Ectopy: PAC with aberrancy  Conduction: normal  ST Segments/ T Waves: No ST-T wave changes  Q Waves: none  Comparison to prior: Unchanged from EKG dated July 22, 2019 other than new ectopic beats    Clinical Impression: abnormal EKG                Labs Ordered and Resulted from Time of ED Arrival to Time of ED Departure   COMPREHENSIVE METABOLIC PANEL - Abnormal       Result Value    Sodium 141      Potassium 4.2      Chloride 105      Carbon Dioxide (CO2) 24      Anion Gap 12      Urea Nitrogen 14.9      Creatinine 0.67      Calcium 9.5      Glucose 117 (*)     Alkaline Phosphatase 95      AST 37 (*)     ALT 49 (*)     Protein Total 7.8      Albumin 4.2      Bilirubin Total 0.4      GFR Estimate >90     TROPONIN T, HIGH SENSITIVITY - Normal    Troponin T, High Sensitivity <6     MAGNESIUM - Normal    Magnesium 2.1     TSH WITH FREE T4 REFLEX - Normal    TSH 2.52     NT PROBNP INPATIENT - Normal    N terminal Pro BNP Inpatient 61     HCG QUALITATIVE " PREGNANCY - Normal    hCG Serum Qualitative Negative     CBC WITH PLATELETS AND DIFFERENTIAL    WBC Count 4.9      RBC Count 4.63      Hemoglobin 13.1      Hematocrit 39.8      MCV 86      MCH 28.3      MCHC 32.9      RDW 12.2      Platelet Count 187      % Neutrophils 39      % Lymphocytes 43      % Monocytes 11      % Eosinophils 6      % Basophils 1      % Immature Granulocytes 0      NRBCs per 100 WBC 0      Absolute Neutrophils 1.9      Absolute Lymphocytes 2.1      Absolute Monocytes 0.6      Absolute Eosinophils 0.3      Absolute Basophils 0.1      Absolute Immature Granulocytes 0.0      Absolute NRBCs 0.0              Results for orders placed or performed during the hospital encounter of 02/20/23 (from the past 24 hour(s))   CBC with platelets differential    Narrative    The following orders were created for panel order CBC with platelets differential.  Procedure                               Abnormality         Status                     ---------                               -----------         ------                     CBC with platelets and d...[[id number]]                      Final result                 Please view results for these tests on the individual orders.   Comprehensive metabolic panel   Result Value Ref Range    Sodium 141 136 - 145 mmol/L    Potassium 4.2 3.4 - 5.3 mmol/L    Chloride 105 98 - 107 mmol/L    Carbon Dioxide (CO2) 24 22 - 29 mmol/L    Anion Gap 12 7 - 15 mmol/L    Urea Nitrogen 14.9 8.0 - 23.0 mg/dL    Creatinine 0.67 0.51 - 0.95 mg/dL    Calcium 9.5 8.8 - 10.2 mg/dL    Glucose 117 (H) 70 - 99 mg/dL    Alkaline Phosphatase 95 35 - 104 U/L    AST 37 (H) 10 - 35 U/L    ALT 49 (H) 10 - 35 U/L    Protein Total 7.8 6.4 - 8.3 g/dL    Albumin 4.2 3.5 - 5.2 g/dL    Bilirubin Total 0.4 <=1.2 mg/dL    GFR Estimate >90 >60 mL/min/1.73m2   Troponin T, High Sensitivity   Result Value Ref Range    Troponin T, High Sensitivity <6 <=14 ng/L   Magnesium   Result Value Ref Range    Magnesium  2.1 1.7 - 2.3 mg/dL   TSH with free T4 reflex   Result Value Ref Range    TSH 2.52 0.30 - 4.20 uIU/mL   Nt probnp inpatient (BNP)   Result Value Ref Range    N terminal Pro BNP Inpatient 61 0 - 900 pg/mL   HCG qualitative Blood   Result Value Ref Range    hCG Serum Qualitative Negative Negative   CBC with platelets and differential   Result Value Ref Range    WBC Count 4.9 4.0 - 11.0 10e3/uL    RBC Count 4.63 3.80 - 5.20 10e6/uL    Hemoglobin 13.1 11.7 - 15.7 g/dL    Hematocrit 39.8 35.0 - 47.0 %    MCV 86 78 - 100 fL    MCH 28.3 26.5 - 33.0 pg    MCHC 32.9 31.5 - 36.5 g/dL    RDW 12.2 10.0 - 15.0 %    Platelet Count 187 150 - 450 10e3/uL    % Neutrophils 39 %    % Lymphocytes 43 %    % Monocytes 11 %    % Eosinophils 6 %    % Basophils 1 %    % Immature Granulocytes 0 %    NRBCs per 100 WBC 0 <1 /100    Absolute Neutrophils 1.9 1.6 - 8.3 10e3/uL    Absolute Lymphocytes 2.1 0.8 - 5.3 10e3/uL    Absolute Monocytes 0.6 0.0 - 1.3 10e3/uL    Absolute Eosinophils 0.3 0.0 - 0.7 10e3/uL    Absolute Basophils 0.1 0.0 - 0.2 10e3/uL    Absolute Immature Granulocytes 0.0 <=0.4 10e3/uL    Absolute NRBCs 0.0 10e3/uL   US Upper Extremity Venous Duplex Left    Narrative    EXAMINATION: DOPPLER VENOUS ULTRASOUND OF THE LEFT UPPER EXTREMITY,  2/20/2023 9:01 AM     COMPARISON: None.    HISTORY: Left arm tingling    TECHNIQUE:  Gray-scale evaluation with compression, spectral flow and  color Doppler assessment of the deep venous system of the left upper  extremity.    FINDINGS:  Normal blood flow and waveforms are demonstrated in the internal  jugular, innominate, subclavian, and axillary veins. There is normal  compressibility of the brachial, basilic and cephalic veins.      Impression    IMPRESSION:  No evidence of left upper extremity deep venous  thrombosis.    I have personally reviewed the examination and initial interpretation  and I agree with the findings.    JOHNATHAN JURADO MD         SYSTEM ID:  [id number]   XR Chest 2 Views     Impression    RESIDENT PRELIMINARY INTERPRETATION  Impression: Negative chest.       Labs, vital signs, and imaging studies were reviewed by me.    Medications - No data to display    Assessments & Plan (with Medical Decision Making)   Roverto Gonzalez is a 58 year old female who presents to the emergency department with palpitations.  Differential diagnosis includes PVCs, PACs, SVT, atrial fibrillation, other cardiac arrhythmia.  Labs and chest x-ray obtained to rule out underlying electrolyte problem, thyroid disorder, ACS, CHF.    EKG shows ectopic beats.  While EKG being obtained, I was able to see additional ectopy on monitor screen. This seems to correspond to patient's symptoms    Laboratory work-up is remarkable for normal electrolytes, normal TSH, normal troponin/BNP    Chest x-ray is normal    0932 Patient discussed with EP cardiology, they have reviewed patient's EKG (uploaded in media tab of pt's chart as did not upload to cardiology tab in Epic yet) recommend starting cardizem 120 mg daily and follow up outpatient with cardiology    Medical Decision Making  The patient presented with a problem that is a stable chronic illness.    The patient's evaluation involved:  review of external note(s) from 2 sources (cardiology, family medicine)  ordering and/or review of 3+ test(s) in this encounter (see separate area of note for details)  review of 3+ test result(s) ordered prior to this encounter ([facility], prior labs)  independent interpretation of testing performed by another health professional (CXR)  discussion of management or test interpretation with another health professional (cardiology)    The patient's management involved prescription drug management (including medications given in the ED).    I have reviewed the nursing notes.    I have reviewed the findings, diagnosis, plan and need for follow up with the patient.    Patient to be discharged home. Advised to follow up with cardiology. To return to ER  immediately with any new/worsening symptoms. Plan of care discussed with patient who expresses understanding and agrees with plan of care.      New Prescriptions    DILTIAZEM ER (TIAZAC) 120 MG 24 HR ER BEADED CAPSULE    Take 1 capsule (120 mg) by mouth daily       Final diagnoses:   Palpitations   Ectopic beats       Laura Conley MD  2/20/2023   [facility] EMERGENCY DEPARTMENT     Laura Conley MD  02/20/23 0944

## 2023-02-20 NOTE — DISCHARGE INSTRUCTIONS
TODAY'S VISIT:  You were seen today for palpitations   -   - If you had any labs or imaging/radiology tests performed today, you should also discuss these tests with your usual provider.     FOLLOW-UP:  Please make an appointment to follow up with:  - Your Primary Care Provider. If you do not have a PCP, please call the Primary Care Center (phone: [phone] for an appointment    - Have your provider review the results from today's visit with you again to make sure no further follow-up or additional testing is needed based on those results.     OTHER INSTRUCTIONS:  - make sure to stay well hydrated  - avoid caffeine, nicotine, alcohol, and over the counter decongestants as these can worsen palpitations    RETURN TO THE EMERGENCY DEPARTMENT  Return to the Emergency Department at any time for any new or worsening symptoms or any concerns.

## 2023-02-20 NOTE — ED TRIAGE NOTES
Pt presents with a month long history of heart palpitations and tingling in her left arm.  Reports sporadic SOB, but not currently.  Had ECHO 1 week ago.       Triage Assessment     Row Name 02/20/23 0806       Triage Assessment (Adult)    Airway WDL WDL       Respiratory WDL    Respiratory WDL WDL       Skin Circulation/Temperature WDL    Skin Circulation/Temperature WDL WDL       Cardiac WDL    Cardiac WDL WDL       Peripheral/Neurovascular WDL    Peripheral Neurovascular WDL WDL       Cognitive/Neuro/Behavioral WDL    Cognitive/Neuro/Behavioral WDL WDL

## 2023-05-17 NOTE — PROGRESS NOTES
[facility]    OUTPATIENT Physical Therapy ORTHOPEDIC EVALUATION  PLAN OF TREATMENT FOR OUTPATIENT REHABILITATION  (COMPLETE FOR INITIAL CLAIMS ONLY)  Patient's Last Name, First Name, M.I.  YOB: 1965  Roverto Gonzalez    Provider s Name:  [facility]   Medical Record No.  [id number]   Start of Care Date:      Onset Date:   02/10/23 (MD order date)   Treatment Diagnosis:  chronic thoracic back pain Medical Diagnosis:     Chronic bilateral thoracic back pain  Thoracic back pain       Goals:     02/14/23 0500   Body Part   Goals listed below are for Thoracic pain   Goal #1   Goal #1 lifting/carrying   Previous Functional Level No restrictions   Current Functional Level Cannot lift;an item to counter height weighing   Performance level 10   STG [employer] Performance Lift an item to counter height weighing   Performance level 10 lbs   Rationale for housework such as laundry, emptying garbage, use of ;for grocery shopping   Due date 03/11/23   LTG [employer] Performance Lift an item to counter height weighing   Performance Level 25 lbs   Rationale for housework such as laundry, emptying garbage, use of    Due date 04/30/23         Therapy Frequency:     Predicted Duration of Therapy Intervention:       Monica Jordan, PT                 I CERTIFY THE NEED FOR THESE SERVICES FURNISHED UNDER        THIS PLAN OF TREATMENT AND WHILE UNDER MY CARE     (Physician attestation of this document indicates review and certification of the therapy plan).                     Certification Date From:      Certification Date To:       Referring Provider:  Anisha Ramirez    Initial Assessment        See Epic Evaluation

## 2024-01-11 ENCOUNTER — PATIENT OUTREACH (OUTPATIENT)
Dept: CARE COORDINATION | Facility: CLINIC | Age: 59
End: 2024-01-11

## 2024-01-25 ENCOUNTER — PATIENT OUTREACH (OUTPATIENT)
Dept: CARE COORDINATION | Facility: CLINIC | Age: 59
End: 2024-01-25

## 2024-01-30 ENCOUNTER — TELEPHONE (OUTPATIENT)
Dept: NEUROLOGY | Facility: CLINIC | Age: 59
End: 2024-01-30

## 2024-01-30 DIAGNOSIS — I67.1 CEREBRAL ANEURYSM, NONRUPTURED: Primary | ICD-10-CM

## 2024-02-13 ENCOUNTER — TELEPHONE (OUTPATIENT)
Dept: NEUROLOGY | Facility: CLINIC | Age: 59
End: 2024-02-13

## 2024-02-13 DIAGNOSIS — I10 BENIGN ESSENTIAL HYPERTENSION: ICD-10-CM

## 2024-02-13 RX ORDER — LOSARTAN POTASSIUM 25 MG/1
TABLET ORAL
Qty: 90 TABLET | Refills: 0 | Status: SHIPPED | OUTPATIENT
Start: 2024-02-13 | End: 2024-03-13

## 2024-02-13 NOTE — TELEPHONE ENCOUNTER
Refill should come from PCP - Another encounter already sent to pcp.  Refill request for: Losartan    LOV: 12/1/22  NOV: 3/13/24

## 2024-02-13 NOTE — TELEPHONE ENCOUNTER
[facility] Call Center    Phone Message    May a detailed message be left on voicemail: yes     Reason for Call: Medication Question or concern regarding medication   Prescription Clarification  Name of Medication: losartan (COZAAR) 25 MG tablet    Prescribing Provider:    Pharmacy: [facility] [address] [place] [address], [place] [address]   What on the order needs clarification? Patient called requesting a refill on medication, pt does not want to be out       Action Taken: Other: [facility] neurolog[facility]    Travel Screening: Not Applicable

## 2024-02-14 NOTE — TELEPHONE ENCOUNTER
Pt declined scheduling, does not have insurance right now and stated that she will schedule when she has insurance.

## 2024-02-25 ENCOUNTER — HEALTH MAINTENANCE LETTER (OUTPATIENT)
Age: 59
End: 2024-02-25

## 2024-03-08 ENCOUNTER — HOSPITAL ENCOUNTER (OUTPATIENT)
Dept: MRI IMAGING | Facility: HOSPITAL | Age: 59
Discharge: HOME | End: 2024-03-08
Attending: PSYCHIATRY & NEUROLOGY | Admitting: PSYCHIATRY & NEUROLOGY
Payer: COMMERCIAL

## 2024-03-08 DIAGNOSIS — I67.1 CEREBRAL ANEURYSM, NONRUPTURED: ICD-10-CM

## 2024-03-08 PROCEDURE — 70544 MR ANGIOGRAPHY HEAD W/O DYE: CPT

## 2024-03-13 ENCOUNTER — OFFICE VISIT (OUTPATIENT)
Dept: NEUROLOGY | Facility: CLINIC | Age: 59
End: 2024-03-13
Payer: COMMERCIAL

## 2024-03-13 VITALS
HEIGHT: 65 IN | SYSTOLIC BLOOD PRESSURE: 138 MMHG | WEIGHT: 189.8 LBS | DIASTOLIC BLOOD PRESSURE: 77 MMHG | HEART RATE: 64 BPM | BODY MASS INDEX: 31.62 KG/M2

## 2024-03-13 DIAGNOSIS — R20.0 HAND NUMBNESS: ICD-10-CM

## 2024-03-13 DIAGNOSIS — I67.1 CEREBRAL ANEURYSM, NONRUPTURED: Primary | ICD-10-CM

## 2024-03-13 DIAGNOSIS — I10 BENIGN ESSENTIAL HYPERTENSION: ICD-10-CM

## 2024-03-13 DIAGNOSIS — G56.23 ULNAR NEUROPATHY OF BOTH UPPER EXTREMITIES: ICD-10-CM

## 2024-03-13 PROCEDURE — G2211 COMPLEX E/M VISIT ADD ON: HCPCS | Performed by: PSYCHIATRY & NEUROLOGY

## 2024-03-13 PROCEDURE — 99214 OFFICE O/P EST MOD 30 MIN: CPT | Performed by: PSYCHIATRY & NEUROLOGY

## 2024-03-13 RX ORDER — LOSARTAN POTASSIUM 25 MG/1
TABLET ORAL
Qty: 90 TABLET | Refills: 3 | Status: SHIPPED | OUTPATIENT
Start: 2024-03-13

## 2024-03-13 NOTE — NURSING NOTE
Chief Complaint   Patient presents with    cerebral aneurysm     Patient reports that she is having numbness in her left hand and left arm.     Kareen Meyer on 3/13/2024 at 11:40 AM

## 2024-03-13 NOTE — LETTER
3/13/2024         RE: Roverto Gonzalez  [address]  [place] [address]        Dear Colleague,    Thank you for referring your patient, Roverto Gonzalez, to the [facility] [place]. Please see a copy of my visit note below.    NEUROLOGY OUTPATIENT PROGRESS NOTE  Mar 13, 2024     CHIEF COMPLAINT/REASON FOR VISIT/REASON FOR CONSULT  Patient presents with:  cerebral aneurysm: Patient reports that she is having numbness in her left hand and left arm.    REASON FOR CONSULTATION- Numbness    REFERRAL SOURCE  Dr. Finkelstein  CC Dr. Finkelstein    HISTORY OF PRESENT ILLNESS  Roverto Gonzalez is a 59 year old female seen today for evaluation of hand numbness.  Reports that the numbness came on in 3 months ago.  All 5 fingers are involved.  Is present on both sides.  The symptoms come and go.  Washing the dishes does make the symptoms come on.  She does have some left-sided weakness as well.  She does have chronic neck pain more on the left side.  Denies any shooting pain down the arm.  She is done physical therapy in the past which is helped the neck pain.  Reports no recent falls or injuries.  Occasionally will have intermittent numbness in her legs here and there.    Patient does have a history of a previous aneurysm that was being managed by Dr. Novoas.  Patient reports no ongoing headaches there is continued some pressure in the left side of her head.  Has not had a repeat scan for several years.  No family history of aneurysm.  No smoking history.    3/16/21  Patient returns today.  Reports that he continues to have hand numbness and hand pain.  This is mainly in the left hand in the fourth and fifth digit.  She continues to have neck pain as well.  Further reports of pressure in her ear.  Reports no headaches.  Reports it is mainly in the year.  She further was identified to have a aneurysm on her MRA.  Discussed significance of this.  Discussed treatment options.  Discussed about  routine surveillance.    She has been working with her primary care doctor to get the blood pressure under good control.  She was having some dizziness due to high blood pressure medication and this has been decreased.  She further reports no's history of aneurysm risk factors.  No family history of aneurysms.  No history of tobacco abuse.  Reports no headaches.    Ear pressure is in the ear.  Further reports tinnitus.  Reports no headaches.    6/16/21  Patient returns today.  Reports that she had to travel to [place] and has not been able to do the testing/therapy that had recommended.      She reports that the numbness in her hands is intermittent.  She has not done any physical therapy.  Feels that these are getting better.  Continues to have pressure in the years.  Is seeing the ENT doctor.  Has not been able to have the appointment so far.    Her MRI has shown an aneurysm in the past.  Reports no headaches.  We further talked about her blood pressure she reports that her blood pressure has been under good control though more recently she is been having a lot of cough.  She feels that she stopped the lisinopril and the cough goes away.  Discussed about side effects of the lisinopril and she would like me to change the medication for her.    12/1/22  Patient returns today  1.  No further numbness in her hands.  2.  Ear pressure is resolved.  Did not see ENT  3.  No symptoms attributable to the aneurysms.  No headaches.  Has been taking the blood pressure medication without any side effects.  No coughing.  Overall has been doing well.  Plans to travel this winter.  MRA did not show any worsening of the aneurysms.  Denies any secondhand smoking exposure.  Is trying to go for walks every day.  No strenuous exercise.    3/13/24  Patient returns today  1.  Continues to have numbness in her hands.  Left side is worse than right.  Feels that the numbness is coming back and getting worse.  Starts sometimes in the upper  "deltoid muscles and then radiates down though no major neck pain.  2.  No symptoms attributable to the aneurysms.  No family history of aneurysms.  Does not smoke.  Blood pressure is under good control with the Cozaar.  No side effects.  Needs a refill on the Cozaar  No other new issues/symptoms.    Previous history is reviewed and this is unchanged.    PAST MEDICAL/SURGICAL HISTORY  Past Medical History:   Diagnosis Date     Breast cancer (H)     lumpectomy on left with radiation      Breast cancer (H)     s/p resection and radiation     DCIS (ductal carcinoma in situ) of breast     ER-IL-     GERD (gastroesophageal reflux disease)      Hx of radiation therapy      Hypertension      Vitamin D deficiency      Patient Active Problem List   Diagnosis     Malignant neoplasm of left female breast, unspecified estrogen receptor status, unspecified site of breast (H)     Malignant neoplasm of lower-outer quadrant of female breast (H)     Vitamin D deficiency     Thyroid cyst     Restrictive lung disease     Prediabetes     Sicca syndrome (H24)     Mixed hyperlipidemia     Asthma     Benign essential hypertension     Cerebral arterial aneurysm     DCIS (ductal carcinoma in situ)     Helicobacter pylori infection     Obesity (BMI 30.0-34.9)     Morbid obesity (H)     Thoracic back pain       FAMILY HISTORY  Family History   Problem Relation Age of Onset     Cancer Father      Throat cancer Father          \"I think age 82.\"     Hypertension Mother      Hereditary Breast and Ovarian Cancer Syndrome No family hx of    Negative for neuropathy or aneurysm    SOCIAL HISTORY  Social History     Tobacco Use     Smoking status: Never     Smokeless tobacco: Never   Vaping Use     Vaping Use: Never used   Substance Use Topics     Alcohol use: No     Drug use: No       SYSTEMS REVIEW  Twelve-system ROS was done and other than the HPI this was negative.   No new symptoms/issues.    MEDICATIONS  diltiazem ER (TIAZAC) " "120 MG 24 hr ER beaded capsule, Take 1 capsule (120 mg) by mouth daily  fluticasone (FLOVENT HFA) 110 MCG/ACT inhaler, Inhale 2 puffs into the lungs 2 times daily  albuterol (PROAIR HFA/PROVENTIL HFA/VENTOLIN HFA) 108 (90 Base) MCG/ACT inhaler, Inhale 2 puffs into the lungs every 6 hours as needed for wheezing  methocarbamol (ROBAXIN) 500 MG tablet, Take 1 tablet (500 mg) by mouth 3 times daily as needed for muscle spasms (Patient not taking: Reported on 3/13/2024)    No current facility-administered medications on file prior to visit.       PHYSICAL EXAMINATION  VITALS: /77   Pulse 64   Ht 1.651 m (5' 5\")   Wt 86.1 kg (189 lb 12.8 oz)   BMI 31.58 kg/m    GENERAL: Healthy appearing, alert, no acute distress, normal habitus.  CARDIOVASCULAR: Extremities warm and well-perfused.  NEUROLOGICAL:  Patient is awake and oriented to self, place and time.  Attention span is normal.  Memory is grossly intact.  Language is fluent and follows commands appropriately.  Appropriate fund of knowledge. Cranial nerves 2-12 are intact. There is no pronator drift.  Motor exam shows 5/5 strength in all extremities.  Tone is symmetric bilaterally in upper and lower extremities.  (Previously reflexes are symmetric and 2+ in upper extremities and lower extremities. Sensory exam is grossly intact to light touch, pin prick and vibration. ) Finger to nose and heel to shin is without dysmetria.  Romberg is negative.  Gait is normal and the patient is able to do tandem walk and walk on toes and heels.   Exam stable compared to before.    DIAGNOSTICS  MRI  HEAD MRI:   1.  Stable exam.  2.  No mass, hemorrhage or stroke.  3.  Stable mild/moderate burden of nonspecific white matter T2   prolongation. The appearance is not consistent with demyelination.   Findings may be secondary to chronic small vessel ischemic change.     HEAD MRA:   1.  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 No further " follow-up of this finding is recommended.  2.  No intradural aneurysm.  3.  No high-grade stenosis.    OUTSIDE RECORDS  Outside referral notes were reviewed and pertinent information has been summarized;-patient was referred on October 14, 2020.  Was seen by Dr. Naqvi.  Patient was having joint pain.  Had elevated ESR and CRP.  Was also having some paresthesias in the forearm and fingertips.  This is more at night.  She negative Tinel's and Phalen's.  She is also been referred to neurology in the past but she did not pursue that.  Was seen by Dr. Mcclain in the past.  This was in July 2017.  Patient at the time was complaining of some back pain.  Patient did have an MRI which did show an aneurysm.  Patient was recommended to have a repeat scan in 5 years.  She was told to follow-up with her primary care doctor for her chronic back pain.    EMG  CLINICAL INTERPRETATION:  This is an abnormal nerve conduction and EMG study.  The needle study shows chronic left ulnar denervation but no active denervation findings.  Further clinical correlation is needed.     MRI C spine - images reviewed.  IMPRESSION:   1.  Minimal cervical spondylosis without spinal canal or foraminal stenosis. No spinal cord signal abnormality.    MRA - images personally reviewed  IMPRESSION:   1.  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   2.  Unchanged extradural 1-2 mm proximal cavernous left internal carotid artery aneurysm.  3.  Focal 2 mm outpouching arising medially from the right A1-2 junction likely represents the normal anterior commuting indicating artery rather than a true saccular aneurysm.    MRA 2022-images reviewed.    IMPRESSION:  1.  Overall stable appearance from 02/23/2021.     2.  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     3.  Specifically, stable unruptured 3 mm saccular aneurysm left MCA M1-M2 junction directed inferiorly with no  change in size/morphology.     4.  Stable 1 mm laterally-directed aneurysm of the proximal left cavernous ICA segment, unruptured with no change in size or morphology.     5.  Stable lobular prominence of anterior communicate artery or a small 1 mm aneurysm at the level of the right A1-A2 junction as before.     6.  No new or enlarging aneurysms.     7.  Tortuosity of the high right cervical ICA level below the skull base and of the cavernous carotid siphons.     8.  No other changes.      MRA brain  IMPRESSION:  1.  Unchanged aneurysms at the left MCA trifurcation and in the proximal cavernous left ICA.  2.  Questionable outpouching at the right A1 A2 junction, may represent an infundibulum at the origin of anterior communicating artery or an aneurysm and appears unchanged compared to 08/01/2022.  Component      Latest Ref Rn 2/20/2023  8:34 AM   WBC      4.0 - 11.0 10e3/uL 4.9    RBC Count      3.80 - 5.20 10e6/uL 4.63    Hemoglobin      11.7 - 15.7 g/dL 13.1    Hematocrit      35.0 - 47.0 % 39.8    MCV      78 - 100 fL 86    MCH      26.5 - 33.0 pg 28.3    MCHC      31.5 - 36.5 g/dL 32.9    RDW      10.0 - 15.0 % 12.2    Platelet Count      150 - 450 10e3/uL 187    % Neutrophils      % 39    % Lymphocytes      % 43    % Monocytes      % 11    % Eosinophils      % 6    % Basophils      % 1    % Immature Granulocytes      % 0    NRBCs per 100 WBC      <1 /100 0    Absolute Neutrophils      1.6 - 8.3 10e3/uL 1.9    Absolute Lymphocytes      0.8 - 5.3 10e3/uL 2.1    Absolute Monocytes      0.0 - 1.3 10e3/uL 0.6    Absolute Eosinophils      0.0 - 0.7 10e3/uL 0.3    Absolute Basophils      0.0 - 0.2 10e3/uL 0.1    Absolute Immature Granulocytes      <=0.4 10e3/uL 0.0    Absolute NRBCs      10e3/uL 0.0    Sodium      136 - 145 mmol/L 141    Potassium      3.4 - 5.3 mmol/L 4.2    Chloride      98 - 107 mmol/L 105    Carbon Dioxide (CO2)      22 - 29 mmol/L 24    Anion Gap      7 - 15 mmol/L 12    Urea Nitrogen      8.0  - 23.0 mg/dL 14.9    Creatinine      0.51 - 0.95 mg/dL 0.67    Calcium      8.8 - 10.2 mg/dL 9.5    Glucose      70 - 99 mg/dL 117 (H)    Alkaline Phosphatase      35 - 104 U/L 95    AST      10 - 35 U/L 37 (H)    ALT      10 - 35 U/L 49 (H)    Protein Total      6.4 - 8.3 g/dL 7.8    Albumin      3.5 - 5.2 g/dL 4.2    Bilirubin Total      <=1.2 mg/dL 0.4    GFR Estimate      >60 mL/min/1.73m2 >90       Legend:  (H) High    IMPRESSION/REPORT/PLAN  Hand numbness with neck pain  Cerebral aneurysm, nonruptured  History of ulnar neuropathy  Hypertension    This is a 59 year old female with new onset of numbness in the hands.  MRI cervical spine was noncontributory.  EMG does show evidence of chronic left ulnar denervation with drop in conduction velocity across the elbow.  Have improved on their own.  Previously physical therapy has been recommended.  Her symptoms have returned and we will repeat the EMG to see if the neuropathies are getting worse.  Will hold off on the MRI C-spine since she does not complain of any significant neck pain.    Her MRI previously had shown a left MCA extradural cavernous ICA aneurysm.  Repeat MRI has shown multiple anterior circulation aneurysms.  There continues to be a left MCA M1 M2 aneurysm, left cavernous ICA aneurysm, right A1 A2 junction aneurysm.  There is some tortuosity of the high right cervical ICA as well.  Overall scan is stable and we will repeat a scan in 1 year.  This has been stable.  Continue annual scans.  Discussed risk factors for aneurysms.  There is no family history or smoking history though she does have a diagnosis of hypertension.  Aneurysm signs and symptoms reviewed.    Lisinopril caused side effects of coughing.  Blood pressure in under bladder control with the losartan and will continue.  Refills provided.    Return in 3 to 4 months with a EMG.    -     MRA Head needed in March 2025  -     losartan (COZAAR) 25 MG tablet; TAKE 1 TABLET(25 MG) BY MOUTH  DAILY.  -     EMG; Future    Return in about 2 months (around 5/13/2024) for In-Clinic Visit (must), After testing.    Over 32 minutes were spent coordinating the care for the patient on the day of the encounter.  This includes previsit, during visit and post visit activities as documented above.  Counseling patient.  Multiple problems reviewed/addressed.  Testing ordered.  Refractory problems.  Prescription management.  (Activities include but not inclusive of reviewing chart, reviewing outside records, reviewing labs and imaging study results as well as the images, patient visit time including getting history and exam,  use if applicable, review of test results with the patient and coming up with a plan in a shared model, counseling patient and family, education and answering patient questions, EMR , EMR diagnosis entry and problem list management, medication reconciliation and prescription management if applicable, paperwork if applicable, printing documents and documentation of the visit activities.)    Malik Del Cid MD  Neurologist  [facility]  Tel:- [phone]    This note was dictated using voice recognition software.  Any grammatical or context distortions are unintentional and inherent to the software.      Again, thank you for allowing me to participate in the care of your patient.        Sincerely,        Malik Del Cid MD

## 2024-03-13 NOTE — PROGRESS NOTES
NEUROLOGY OUTPATIENT PROGRESS NOTE  Mar 13, 2024     CHIEF COMPLAINT/REASON FOR VISIT/REASON FOR CONSULT  Patient presents with:  cerebral aneurysm: Patient reports that she is having numbness in her left hand and left arm.    REASON FOR CONSULTATION- Numbness    REFERRAL SOURCE  Dr. Finkelstein  CC Dr. Finkelstein    HISTORY OF PRESENT ILLNESS  Roverto Gonzalez is a 59 year old female seen today for evaluation of hand numbness.  Reports that the numbness came on in 3 months ago.  All 5 fingers are involved.  Is present on both sides.  The symptoms come and go.  Washing the dishes does make the symptoms come on.  She does have some left-sided weakness as well.  She does have chronic neck pain more on the left side.  Denies any shooting pain down the arm.  She is done physical therapy in the past which is helped the neck pain.  Reports no recent falls or injuries.  Occasionally will have intermittent numbness in her legs here and there.    Patient does have a history of a previous aneurysm that was being managed by Dr. Noovas.  Patient reports no ongoing headaches there is continued some pressure in the left side of her head.  Has not had a repeat scan for several years.  No family history of aneurysm.  No smoking history.    3/16/21  Patient returns today.  Reports that he continues to have hand numbness and hand pain.  This is mainly in the left hand in the fourth and fifth digit.  She continues to have neck pain as well.  Further reports of pressure in her ear.  Reports no headaches.  Reports it is mainly in the year.  She further was identified to have a aneurysm on her MRA.  Discussed significance of this.  Discussed treatment options.  Discussed about routine surveillance.    She has been working with her primary care doctor to get the blood pressure under good control.  She was having some dizziness due to high blood pressure medication and this has been decreased.  She further reports no's history of aneurysm risk  factors.  No family history of aneurysms.  No history of tobacco abuse.  Reports no headaches.    Ear pressure is in the ear.  Further reports tinnitus.  Reports no headaches.    6/16/21  Patient returns today.  Reports that she had to travel to [place] and has not been able to do the testing/therapy that had recommended.      She reports that the numbness in her hands is intermittent.  She has not done any physical therapy.  Feels that these are getting better.  Continues to have pressure in the years.  Is seeing the ENT doctor.  Has not been able to have the appointment so far.    Her MRI has shown an aneurysm in the past.  Reports no headaches.  We further talked about her blood pressure she reports that her blood pressure has been under good control though more recently she is been having a lot of cough.  She feels that she stopped the lisinopril and the cough goes away.  Discussed about side effects of the lisinopril and she would like me to change the medication for her.    12/1/22  Patient returns today  1.  No further numbness in her hands.  2.  Ear pressure is resolved.  Did not see ENT  3.  No symptoms attributable to the aneurysms.  No headaches.  Has been taking the blood pressure medication without any side effects.  No coughing.  Overall has been doing well.  Plans to travel this winter.  MRA did not show any worsening of the aneurysms.  Denies any secondhand smoking exposure.  Is trying to go for walks every day.  No strenuous exercise.    3/13/24  Patient returns today  1.  Continues to have numbness in her hands.  Left side is worse than right.  Feels that the numbness is coming back and getting worse.  Starts sometimes in the upper deltoid muscles and then radiates down though no major neck pain.  2.  No symptoms attributable to the aneurysms.  No family history of aneurysms.  Does not smoke.  Blood pressure is under good control with the Cozaar.  No side effects.  Needs a refill on the Cozaar  No  "other new issues/symptoms.    Previous history is reviewed and this is unchanged.    PAST MEDICAL/SURGICAL HISTORY  Past Medical History:   Diagnosis Date    Breast cancer (H)     lumpectomy on left with radiation     Breast cancer (H)     s/p resection and radiation    DCIS (ductal carcinoma in situ) of breast     ER-ID-    GERD (gastroesophageal reflux disease)     Hx of radiation therapy     Hypertension     Vitamin D deficiency      Patient Active Problem List   Diagnosis    Malignant neoplasm of left female breast, unspecified estrogen receptor status, unspecified site of breast (H)    Malignant neoplasm of lower-outer quadrant of female breast (H)    Vitamin D deficiency    Thyroid cyst    Restrictive lung disease    Prediabetes    Sicca syndrome (H24)    Mixed hyperlipidemia    Asthma    Benign essential hypertension    Cerebral arterial aneurysm    DCIS (ductal carcinoma in situ)    Helicobacter pylori infection    Obesity (BMI 30.0-34.9)    Morbid obesity (H)    Thoracic back pain       FAMILY HISTORY  Family History   Problem Relation Age of Onset    Cancer Father     Throat cancer Father          \"I think age 82.\"    Hypertension Mother     Hereditary Breast and Ovarian Cancer Syndrome No family hx of    Negative for neuropathy or aneurysm    SOCIAL HISTORY  Social History     Tobacco Use    Smoking status: Never    Smokeless tobacco: Never   Vaping Use    Vaping Use: Never used   Substance Use Topics    Alcohol use: No    Drug use: No       SYSTEMS REVIEW  Twelve-system ROS was done and other than the HPI this was negative.   No new symptoms/issues.    MEDICATIONS  diltiazem ER (TIAZAC) 120 MG 24 hr ER beaded capsule, Take 1 capsule (120 mg) by mouth daily  fluticasone (FLOVENT HFA) 110 MCG/ACT inhaler, Inhale 2 puffs into the lungs 2 times daily  albuterol (PROAIR HFA/PROVENTIL HFA/VENTOLIN HFA) 108 (90 Base) MCG/ACT inhaler, Inhale 2 puffs into the lungs every 6 hours as needed for " "wheezing  methocarbamol (ROBAXIN) 500 MG tablet, Take 1 tablet (500 mg) by mouth 3 times daily as needed for muscle spasms (Patient not taking: Reported on 3/13/2024)    No current facility-administered medications on file prior to visit.       PHYSICAL EXAMINATION  VITALS: /77   Pulse 64   Ht 1.651 m (5' 5\")   Wt 86.1 kg (189 lb 12.8 oz)   BMI 31.58 kg/m    GENERAL: Healthy appearing, alert, no acute distress, normal habitus.  CARDIOVASCULAR: Extremities warm and well-perfused.  NEUROLOGICAL:  Patient is awake and oriented to self, place and time.  Attention span is normal.  Memory is grossly intact.  Language is fluent and follows commands appropriately.  Appropriate fund of knowledge. Cranial nerves 2-12 are intact. There is no pronator drift.  Motor exam shows 5/5 strength in all extremities.  Tone is symmetric bilaterally in upper and lower extremities.  (Previously reflexes are symmetric and 2+ in upper extremities and lower extremities. Sensory exam is grossly intact to light touch, pin prick and vibration. ) Finger to nose and heel to shin is without dysmetria.  Romberg is negative.  Gait is normal and the patient is able to do tandem walk and walk on toes and heels.   Exam stable compared to before.    DIAGNOSTICS  MRI  HEAD MRI:   1.  Stable exam.  2.  No mass, hemorrhage or stroke.  3.  Stable mild/moderate burden of nonspecific white matter T2   prolongation. The appearance is not consistent with demyelination.   Findings may be secondary to chronic small vessel ischemic change.     HEAD MRA:   1.  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 No further follow-up of this finding is recommended.  2.  No intradural aneurysm.  3.  No high-grade stenosis.    OUTSIDE RECORDS  Outside referral notes were reviewed and pertinent information has been summarized;-patient was referred on October 14, 2020.  Was seen by Dr. Naqvi.  Patient was having joint " pain.  Had elevated ESR and CRP.  Was also having some paresthesias in the forearm and fingertips.  This is more at night.  She negative Tinel's and Phalen's.  She is also been referred to neurology in the past but she did not pursue that.  Was seen by Dr. Mcclain in the past.  This was in July 2017.  Patient at the time was complaining of some back pain.  Patient did have an MRI which did show an aneurysm.  Patient was recommended to have a repeat scan in 5 years.  She was told to follow-up with her primary care doctor for her chronic back pain.    EMG  CLINICAL INTERPRETATION:  This is an abnormal nerve conduction and EMG study.  The needle study shows chronic left ulnar denervation but no active denervation findings.  Further clinical correlation is needed.     MRI C spine - images reviewed.  IMPRESSION:   1.  Minimal cervical spondylosis without spinal canal or foraminal stenosis. No spinal cord signal abnormality.    MRA - images personally reviewed  IMPRESSION:   1.  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   2.  Unchanged extradural 1-2 mm proximal cavernous left internal carotid artery aneurysm.  3.  Focal 2 mm outpouching arising medially from the right A1-2 junction likely represents the normal anterior commuting indicating artery rather than a true saccular aneurysm.    MRA 2022-images reviewed.    IMPRESSION:  1.  Overall stable appearance from 02/23/2021.     2.  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     3.  Specifically, stable unruptured 3 mm saccular aneurysm left MCA M1-M2 junction directed inferiorly with no change in size/morphology.     4.  Stable 1 mm laterally-directed aneurysm of the proximal left cavernous ICA segment, unruptured with no change in size or morphology.     5.  Stable lobular prominence of anterior communicate artery or a small 1 mm aneurysm at the level of the right A1-A2 junction as  before.     6.  No new or enlarging aneurysms.     7.  Tortuosity of the high right cervical ICA level below the skull base and of the cavernous carotid siphons.     8.  No other changes.      MRA brain  IMPRESSION:  1.  Unchanged aneurysms at the left MCA trifurcation and in the proximal cavernous left ICA.  2.  Questionable outpouching at the right A1 A2 junction, may represent an infundibulum at the origin of anterior communicating artery or an aneurysm and appears unchanged compared to 08/01/2022.  Component      Latest Ref Rng 2/20/2023  8:34 AM   WBC      4.0 - 11.0 10e3/uL 4.9    RBC Count      3.80 - 5.20 10e6/uL 4.63    Hemoglobin      11.7 - 15.7 g/dL 13.1    Hematocrit      35.0 - 47.0 % 39.8    MCV      78 - 100 fL 86    MCH      26.5 - 33.0 pg 28.3    MCHC      31.5 - 36.5 g/dL 32.9    RDW      10.0 - 15.0 % 12.2    Platelet Count      150 - 450 10e3/uL 187    % Neutrophils      % 39    % Lymphocytes      % 43    % Monocytes      % 11    % Eosinophils      % 6    % Basophils      % 1    % Immature Granulocytes      % 0    NRBCs per 100 WBC      <1 /100 0    Absolute Neutrophils      1.6 - 8.3 10e3/uL 1.9    Absolute Lymphocytes      0.8 - 5.3 10e3/uL 2.1    Absolute Monocytes      0.0 - 1.3 10e3/uL 0.6    Absolute Eosinophils      0.0 - 0.7 10e3/uL 0.3    Absolute Basophils      0.0 - 0.2 10e3/uL 0.1    Absolute Immature Granulocytes      <=0.4 10e3/uL 0.0    Absolute NRBCs      10e3/uL 0.0    Sodium      136 - 145 mmol/L 141    Potassium      3.4 - 5.3 mmol/L 4.2    Chloride      98 - 107 mmol/L 105    Carbon Dioxide (CO2)      22 - 29 mmol/L 24    Anion Gap      7 - 15 mmol/L 12    Urea Nitrogen      8.0 - 23.0 mg/dL 14.9    Creatinine      0.51 - 0.95 mg/dL 0.67    Calcium      8.8 - 10.2 mg/dL 9.5    Glucose      70 - 99 mg/dL 117 (H)    Alkaline Phosphatase      35 - 104 U/L 95    AST      10 - 35 U/L 37 (H)    ALT      10 - 35 U/L 49 (H)    Protein Total      6.4 - 8.3 g/dL 7.8    Albumin      3.5  - 5.2 g/dL 4.2    Bilirubin Total      <=1.2 mg/dL 0.4    GFR Estimate      >60 mL/min/1.73m2 >90       Legend:  (H) High    IMPRESSION/REPORT/PLAN  Hand numbness with neck pain  Cerebral aneurysm, nonruptured  History of ulnar neuropathy  Hypertension    This is a 59 year old female with new onset of numbness in the hands.  MRI cervical spine was noncontributory.  EMG does show evidence of chronic left ulnar denervation with drop in conduction velocity across the elbow.  Have improved on their own.  Previously physical therapy has been recommended.  Her symptoms have returned and we will repeat the EMG to see if the neuropathies are getting worse.  Will hold off on the MRI C-spine since she does not complain of any significant neck pain.    Her MRI previously had shown a left MCA extradural cavernous ICA aneurysm.  Repeat MRI has shown multiple anterior circulation aneurysms.  There continues to be a left MCA M1 M2 aneurysm, left cavernous ICA aneurysm, right A1 A2 junction aneurysm.  There is some tortuosity of the high right cervical ICA as well.  Overall scan is stable and we will repeat a scan in 1 year.  This has been stable.  Continue annual scans.  Discussed risk factors for aneurysms.  There is no family history or smoking history though she does have a diagnosis of hypertension.  Aneurysm signs and symptoms reviewed.    Lisinopril caused side effects of coughing.  Blood pressure in under bladder control with the losartan and will continue.  Refills provided.    Return in 3 to 4 months with a EMG.    -     MRA Head needed in March 2025  -     losartan (COZAAR) 25 MG tablet; TAKE 1 TABLET(25 MG) BY MOUTH DAILY.  -     EMG; Future    Return in about 2 months (around 5/13/2024) for In-Clinic Visit (must), After testing.    Over 32 minutes were spent coordinating the care for the patient on the day of the encounter.  This includes previsit, during visit and post visit activities as documented above.  Counseling  patient.  Multiple problems reviewed/addressed.  Testing ordered.  Refractory problems.  Prescription management.  (Activities include but not inclusive of reviewing chart, reviewing outside records, reviewing labs and imaging study results as well as the images, patient visit time including getting history and exam,  use if applicable, review of test results with the patient and coming up with a plan in a shared model, counseling patient and family, education and answering patient questions, EMR , EMR diagnosis entry and problem list management, medication reconciliation and prescription management if applicable, paperwork if applicable, printing documents and documentation of the visit activities.)    Malik Del Cid MD  Neurologist  [facility]  Tel:- [phone]    This note was dictated using voice recognition software.  Any grammatical or context distortions are unintentional and inherent to the software.

## 2024-05-05 ENCOUNTER — HEALTH MAINTENANCE LETTER (OUTPATIENT)
Age: 59
End: 2024-05-05

## 2025-03-09 ENCOUNTER — HEALTH MAINTENANCE LETTER (OUTPATIENT)
Age: 60
End: 2025-03-09

## 2025-05-17 ENCOUNTER — HEALTH MAINTENANCE LETTER (OUTPATIENT)
Age: 60
End: 2025-05-17

## 2025-08-05 ENCOUNTER — APPOINTMENT (OUTPATIENT)
Dept: MRI IMAGING | Facility: CLINIC | Age: 60
End: 2025-08-05
Attending: FAMILY MEDICINE
Payer: COMMERCIAL

## 2025-08-05 ENCOUNTER — HOSPITAL ENCOUNTER (EMERGENCY)
Facility: CLINIC | Age: 60
Discharge: HOME | End: 2025-08-05
Attending: FAMILY MEDICINE
Payer: COMMERCIAL

## 2025-08-05 ENCOUNTER — APPOINTMENT (OUTPATIENT)
Dept: GENERAL RADIOLOGY | Facility: CLINIC | Age: 60
End: 2025-08-05
Attending: FAMILY MEDICINE
Payer: COMMERCIAL

## 2025-08-05 VITALS
BODY MASS INDEX: 33.72 KG/M2 | WEIGHT: 197.5 LBS | HEIGHT: 64 IN | TEMPERATURE: 98.4 F | HEART RATE: 51 BPM | RESPIRATION RATE: 16 BRPM | DIASTOLIC BLOOD PRESSURE: 81 MMHG | SYSTOLIC BLOOD PRESSURE: 139 MMHG | OXYGEN SATURATION: 99 %

## 2025-08-05 DIAGNOSIS — Z91.148 NONCOMPLIANCE WITH MEDICATION REGIMEN: ICD-10-CM

## 2025-08-05 DIAGNOSIS — R42 DIZZINESS: Primary | ICD-10-CM

## 2025-08-05 LAB
ALBUMIN SERPL BCG-MCNC: 3.8 G/DL (ref 3.5–5.2)
ALP SERPL-CCNC: 82 U/L (ref 40–150)
ALT SERPL W P-5'-P-CCNC: 65 U/L (ref 0–50)
ANION GAP SERPL CALCULATED.3IONS-SCNC: 10 MMOL/L (ref 7–15)
AST SERPL W P-5'-P-CCNC: 45 U/L (ref 0–45)
ATRIAL RATE - MUSE: 44 BPM
BASOPHILS # BLD AUTO: 0 10E3/UL (ref 0–0.2)
BASOPHILS NFR BLD AUTO: 1 %
BILIRUB SERPL-MCNC: 0.4 MG/DL
BUN SERPL-MCNC: 12.4 MG/DL (ref 8–23)
CALCIUM SERPL-MCNC: 8.6 MG/DL (ref 8.8–10.4)
CHLORIDE SERPL-SCNC: 106 MMOL/L (ref 98–107)
CREAT SERPL-MCNC: 0.58 MG/DL (ref 0.51–0.95)
DIASTOLIC BLOOD PRESSURE - MUSE: NORMAL MMHG
EGFRCR SERPLBLD CKD-EPI 2021: >90 ML/MIN/1.73M2
EOSINOPHIL # BLD AUTO: 0.1 10E3/UL (ref 0–0.7)
EOSINOPHIL NFR BLD AUTO: 3 %
ERYTHROCYTE [DISTWIDTH] IN BLOOD BY AUTOMATED COUNT: 13.1 % (ref 10–15)
GLUCOSE SERPL-MCNC: 97 MG/DL (ref 70–99)
HCO3 SERPL-SCNC: 25 MMOL/L (ref 22–29)
HCT VFR BLD AUTO: 32.9 % (ref 35–47)
HGB BLD-MCNC: 11 G/DL (ref 11.7–15.7)
IMM GRANULOCYTES # BLD: 0 10E3/UL
IMM GRANULOCYTES NFR BLD: 0 %
INTERPRETATION ECG - MUSE: NORMAL
LYMPHOCYTES # BLD AUTO: 1.7 10E3/UL (ref 0.8–5.3)
LYMPHOCYTES NFR BLD AUTO: 43 %
MAGNESIUM SERPL-MCNC: 2.4 MG/DL (ref 1.7–2.3)
MCH RBC QN AUTO: 28.5 PG (ref 26.5–33)
MCHC RBC AUTO-ENTMCNC: 33.4 G/DL (ref 31.5–36.5)
MCV RBC AUTO: 85 FL (ref 78–100)
MONOCYTES # BLD AUTO: 0.5 10E3/UL (ref 0–1.3)
MONOCYTES NFR BLD AUTO: 12 %
NEUTROPHILS # BLD AUTO: 1.7 10E3/UL (ref 1.6–8.3)
NEUTROPHILS NFR BLD AUTO: 42 %
NRBC # BLD AUTO: 0 10E3/UL
NRBC BLD AUTO-RTO: 0 /100
NT-PROBNP SERPL-MCNC: 119 PG/ML (ref 0–226)
P AXIS - MUSE: 45 DEGREES
PHOSPHATE SERPL-MCNC: 3.2 MG/DL (ref 2.5–4.5)
PLATELET # BLD AUTO: 145 10E3/UL (ref 150–450)
POTASSIUM SERPL-SCNC: 3.7 MMOL/L (ref 3.4–5.3)
PR INTERVAL - MUSE: 190 MS
PROT SERPL-MCNC: 6.8 G/DL (ref 6.4–8.3)
QRS DURATION - MUSE: 82 MS
QT - MUSE: 456 MS
QTC - MUSE: 389 MS
R AXIS - MUSE: -14 DEGREES
RBC # BLD AUTO: 3.86 10E6/UL (ref 3.8–5.2)
SODIUM SERPL-SCNC: 141 MMOL/L (ref 135–145)
SYSTOLIC BLOOD PRESSURE - MUSE: NORMAL MMHG
T AXIS - MUSE: 10 DEGREES
TROPONIN T SERPL HS-MCNC: <6 NG/L
VENTRICULAR RATE- MUSE: 44 BPM
WBC # BLD AUTO: 4 10E3/UL (ref 4–11)

## 2025-08-05 PROCEDURE — 93005 ELECTROCARDIOGRAM TRACING: CPT | Performed by: FAMILY MEDICINE

## 2025-08-05 PROCEDURE — 70544 MR ANGIOGRAPHY HEAD W/O DYE: CPT

## 2025-08-05 PROCEDURE — 99285 EMERGENCY DEPT VISIT HI MDM: CPT | Mod: 25 | Performed by: FAMILY MEDICINE

## 2025-08-05 PROCEDURE — 84100 ASSAY OF PHOSPHORUS: CPT | Performed by: FAMILY MEDICINE

## 2025-08-05 PROCEDURE — 255N000002 HC RX 255 OP 636: Performed by: FAMILY MEDICINE

## 2025-08-05 PROCEDURE — 93010 ELECTROCARDIOGRAM REPORT: CPT | Performed by: FAMILY MEDICINE

## 2025-08-05 PROCEDURE — 80053 COMPREHEN METABOLIC PANEL: CPT | Performed by: FAMILY MEDICINE

## 2025-08-05 PROCEDURE — 83735 ASSAY OF MAGNESIUM: CPT | Performed by: FAMILY MEDICINE

## 2025-08-05 PROCEDURE — 83880 ASSAY OF NATRIURETIC PEPTIDE: CPT | Performed by: FAMILY MEDICINE

## 2025-08-05 PROCEDURE — 71046 X-RAY EXAM CHEST 2 VIEWS: CPT

## 2025-08-05 PROCEDURE — 36415 COLL VENOUS BLD VENIPUNCTURE: CPT | Performed by: FAMILY MEDICINE

## 2025-08-05 PROCEDURE — A9585 GADOBUTROL INJECTION: HCPCS | Performed by: FAMILY MEDICINE

## 2025-08-05 PROCEDURE — 70553 MRI BRAIN STEM W/O & W/DYE: CPT

## 2025-08-05 PROCEDURE — 85004 AUTOMATED DIFF WBC COUNT: CPT | Performed by: FAMILY MEDICINE

## 2025-08-05 PROCEDURE — 99284 EMERGENCY DEPT VISIT MOD MDM: CPT | Performed by: FAMILY MEDICINE

## 2025-08-05 PROCEDURE — 84484 ASSAY OF TROPONIN QUANT: CPT | Performed by: FAMILY MEDICINE

## 2025-08-05 RX ORDER — GADOBUTROL 604.72 MG/ML
8.6 INJECTION INTRAVENOUS ONCE
Status: COMPLETED | OUTPATIENT
Start: 2025-08-05 | End: 2025-08-05

## 2025-08-05 RX ADMIN — GADOBUTROL 8.6 ML: 604.72 INJECTION INTRAVENOUS at 17:53

## 2025-08-05 ASSESSMENT — ACTIVITIES OF DAILY LIVING (ADL)
ADLS_ACUITY_SCORE: 41

## 2025-08-05 ASSESSMENT — COLUMBIA-SUICIDE SEVERITY RATING SCALE - C-SSRS
1. IN THE PAST MONTH, HAVE YOU WISHED YOU WERE DEAD OR WISHED YOU COULD GO TO SLEEP AND NOT WAKE UP?: NO
2. HAVE YOU ACTUALLY HAD ANY THOUGHTS OF KILLING YOURSELF IN THE PAST MONTH?: NO
6. HAVE YOU EVER DONE ANYTHING, STARTED TO DO ANYTHING, OR PREPARED TO DO ANYTHING TO END YOUR LIFE?: NO

## 2025-08-06 ENCOUNTER — PATIENT OUTREACH (OUTPATIENT)
Dept: FAMILY MEDICINE | Facility: CLINIC | Age: 60
End: 2025-08-06
Payer: COMMERCIAL

## 2025-08-07 RX ORDER — BENZONATATE 200 MG/1
CAPSULE ORAL
COMMUNITY
Start: 2025-07-12 | End: 2025-08-14

## 2025-08-14 ENCOUNTER — OFFICE VISIT (OUTPATIENT)
Dept: FAMILY MEDICINE | Facility: CLINIC | Age: 60
End: 2025-08-14
Payer: COMMERCIAL

## 2025-08-14 VITALS
BODY MASS INDEX: 32.78 KG/M2 | HEIGHT: 64 IN | RESPIRATION RATE: 22 BRPM | OXYGEN SATURATION: 98 % | SYSTOLIC BLOOD PRESSURE: 128 MMHG | WEIGHT: 192 LBS | HEART RATE: 59 BPM | DIASTOLIC BLOOD PRESSURE: 81 MMHG | TEMPERATURE: 97.7 F

## 2025-08-14 DIAGNOSIS — R05.1 ACUTE COUGH: ICD-10-CM

## 2025-08-14 DIAGNOSIS — Z12.31 VISIT FOR SCREENING MAMMOGRAM: ICD-10-CM

## 2025-08-14 DIAGNOSIS — J06.9 VIRAL URI: ICD-10-CM

## 2025-08-14 DIAGNOSIS — Z13.1 DIABETES MELLITUS SCREENING: ICD-10-CM

## 2025-08-14 DIAGNOSIS — J45.41 MODERATE PERSISTENT ASTHMA WITH ACUTE EXACERBATION: Primary | ICD-10-CM

## 2025-08-14 DIAGNOSIS — I10 BENIGN ESSENTIAL HYPERTENSION: ICD-10-CM

## 2025-08-14 DIAGNOSIS — M54.6 CHRONIC BILATERAL THORACIC BACK PAIN: ICD-10-CM

## 2025-08-14 DIAGNOSIS — E66.811 CLASS 1 OBESITY WITH SERIOUS COMORBIDITY AND BODY MASS INDEX (BMI) OF 32.0 TO 32.9 IN ADULT, UNSPECIFIED OBESITY TYPE: ICD-10-CM

## 2025-08-14 DIAGNOSIS — J45.41 MODERATE PERSISTENT ASTHMA WITH ACUTE EXACERBATION: ICD-10-CM

## 2025-08-14 DIAGNOSIS — E78.2 MIXED HYPERLIPIDEMIA: ICD-10-CM

## 2025-08-14 DIAGNOSIS — G89.29 CHRONIC BILATERAL THORACIC BACK PAIN: ICD-10-CM

## 2025-08-14 LAB
CHOLEST SERPL-MCNC: 223 MG/DL
EST. AVERAGE GLUCOSE BLD GHB EST-MCNC: 131 MG/DL
FASTING STATUS PATIENT QL REPORTED: YES
FLUAV RNA SPEC QL NAA+PROBE: NEGATIVE
FLUBV RNA RESP QL NAA+PROBE: NEGATIVE
HBA1C MFR BLD: 6.2 % (ref 0–5.6)
HDLC SERPL-MCNC: 84 MG/DL
LDLC SERPL CALC-MCNC: 129 MG/DL
NONHDLC SERPL-MCNC: 139 MG/DL
RSV RNA SPEC NAA+PROBE: NEGATIVE
SARS-COV-2 RNA RESP QL NAA+PROBE: NEGATIVE
TRIGL SERPL-MCNC: 51 MG/DL

## 2025-08-14 RX ORDER — PREDNISONE 20 MG/1
20 TABLET ORAL 2 TIMES DAILY
Qty: 10 TABLET | Refills: 0 | Status: SHIPPED | OUTPATIENT
Start: 2025-08-14 | End: 2025-08-19

## 2025-08-14 RX ORDER — BENZONATATE 200 MG/1
200 CAPSULE ORAL 3 TIMES DAILY PRN
Qty: 30 CAPSULE | Refills: 0 | Status: SHIPPED | OUTPATIENT
Start: 2025-08-14

## 2025-08-14 RX ORDER — LOSARTAN POTASSIUM 25 MG/1
TABLET ORAL
Qty: 90 TABLET | Refills: 3 | Status: SHIPPED | OUTPATIENT
Start: 2025-08-14

## 2025-08-14 RX ORDER — FLUTICASONE PROPIONATE 110 UG/1
2 AEROSOL, METERED RESPIRATORY (INHALATION) 2 TIMES DAILY
Qty: 36 G | OUTPATIENT
Start: 2025-08-14

## 2025-08-14 RX ORDER — FLUTICASONE PROPIONATE 110 UG/1
2 AEROSOL, METERED RESPIRATORY (INHALATION) 2 TIMES DAILY
Qty: 12 G | Refills: 11 | Status: SHIPPED | OUTPATIENT
Start: 2025-08-14

## 2025-08-14 RX ORDER — METHOCARBAMOL 500 MG/1
500 TABLET, FILM COATED ORAL 3 TIMES DAILY PRN
Qty: 30 TABLET | Refills: 0 | Status: SHIPPED | OUTPATIENT
Start: 2025-08-14

## 2025-08-14 ASSESSMENT — ASTHMA QUESTIONNAIRES
QUESTION_1 LAST FOUR WEEKS HOW MUCH OF THE TIME DID YOUR ASTHMA KEEP YOU FROM GETTING AS MUCH DONE AT WORK, SCHOOL OR AT HOME: ALL OF THE TIME
QUESTION_3 LAST FOUR WEEKS HOW OFTEN DID YOUR ASTHMA SYMPTOMS (WHEEZING, COUGHING, SHORTNESS OF BREATH, CHEST TIGHTNESS OR PAIN) WAKE YOU UP AT NIGHT OR EARLIER THAN USUAL IN THE MORNING: ONCE A WEEK
ACT_TOTALSCORE: 12
QUESTION_4 LAST FOUR WEEKS HOW OFTEN HAVE YOU USED YOUR RESCUE INHALER OR NEBULIZER MEDICATION (SUCH AS ALBUTEROL): ONE OR TWO TIMES PER DAY
QUESTION_5 LAST FOUR WEEKS HOW WOULD YOU RATE YOUR ASTHMA CONTROL: COMPLETELY CONTROLLED
QUESTION_2 LAST FOUR WEEKS HOW OFTEN HAVE YOU HAD SHORTNESS OF BREATH: MORE THAN ONCE A DAY
EMERGENCY_ROOM_LAST_YEAR_TOTAL: ONE

## 2025-08-14 ASSESSMENT — PAIN SCALES - GENERAL: PAINLEVEL_OUTOF10: NO PAIN (0)
